# Patient Record
Sex: FEMALE | Race: WHITE | Employment: UNEMPLOYED | URBAN - NONMETROPOLITAN AREA
[De-identification: names, ages, dates, MRNs, and addresses within clinical notes are randomized per-mention and may not be internally consistent; named-entity substitution may affect disease eponyms.]

---

## 2018-06-21 ENCOUNTER — TELEPHONE (OUTPATIENT)
Dept: CARDIOLOGY CLINIC | Age: 63
End: 2018-06-21

## 2018-08-17 ENCOUNTER — OFFICE VISIT (OUTPATIENT)
Dept: SURGERY | Age: 63
End: 2018-08-17
Payer: COMMERCIAL

## 2018-08-17 VITALS — RESPIRATION RATE: 16 BRPM | WEIGHT: 187 LBS | HEIGHT: 62 IN | BODY MASS INDEX: 34.41 KG/M2

## 2018-08-17 DIAGNOSIS — Z90.13 S/P MASTECTOMY, BILATERAL: Primary | ICD-10-CM

## 2018-08-17 PROCEDURE — 1036F TOBACCO NON-USER: CPT | Performed by: PLASTIC SURGERY

## 2018-08-17 PROCEDURE — 99204 OFFICE O/P NEW MOD 45 MIN: CPT | Performed by: PLASTIC SURGERY

## 2018-08-17 PROCEDURE — G8419 CALC BMI OUT NRM PARAM NOF/U: HCPCS | Performed by: PLASTIC SURGERY

## 2018-08-17 PROCEDURE — 3017F COLORECTAL CA SCREEN DOC REV: CPT | Performed by: PLASTIC SURGERY

## 2018-08-17 PROCEDURE — G8427 DOCREV CUR MEDS BY ELIG CLIN: HCPCS | Performed by: PLASTIC SURGERY

## 2018-08-17 RX ORDER — HYDROCHLOROTHIAZIDE 25 MG/1
25 TABLET ORAL DAILY
COMMUNITY

## 2018-08-17 NOTE — PROGRESS NOTES
symmetry and similar shape between each breast will be the goal. However, a reconstructed breast will never appear the same as a native breast and to expect sone differences between each breasts. There is a likleyhood for needing more than one surgery for revisions. The patient was educated on the risks involving (Breast Implant Associated-Anaplastic Large Cell Lymphoma (BENITO-ALCL)). BENITO-ALCL is not a breast cancer, but a rare and treatable T-cell lymphoma that usually develops as a fluid swelling around breast implants. The lifetime risk for this disease appears to be about 1 case for every 30,000 textured implants. Thus far, there have been no confirmed cases of BENITO-ALCL in women who have had only smooth-surface breast implants. The FDA is not recommending removal of textured implants. Rather, the FDA recommends, as do I, that every woman conduct regular self-examination. The patient was educated that if she does develop BENITOALCL she may require additional treatment such as radiation or chemotherapy along with removal of the breast implant and surrounding scar tissue. The risks, benefits and options were discussed with the pt. The risks included but not limited to pain, bleeding, infection, heavy scarring, damage to surrounding structures,  and need for further procedures. Other risks including but not limited to asymmetry, loss of nipple or/and areola, loss of sensation to nipple and areola, inability to breast feed, seroma, hematoma, implant failure, capsular contracture, and fat necrosis were also discussed with the patient. All of her questions were answered. .    After long discussion with the patient and there is no guarantee that I can give her an optimal cosmetic result with her history of radiation therapy as well as the need for likely tissue expanders with subsequent implant placement.  Informed the patient this will require a minimum of 2 surgeries with no guarantee that she has an optimal

## 2018-08-21 ENCOUNTER — OFFICE VISIT (OUTPATIENT)
Dept: CARDIOLOGY CLINIC | Age: 63
End: 2018-08-21
Payer: COMMERCIAL

## 2018-08-21 VITALS
RESPIRATION RATE: 16 BRPM | HEIGHT: 62 IN | BODY MASS INDEX: 34.78 KG/M2 | DIASTOLIC BLOOD PRESSURE: 84 MMHG | SYSTOLIC BLOOD PRESSURE: 130 MMHG | WEIGHT: 189 LBS | HEART RATE: 58 BPM

## 2018-08-21 DIAGNOSIS — R06.02 SOB (SHORTNESS OF BREATH): Primary | ICD-10-CM

## 2018-08-21 DIAGNOSIS — R06.09 DOE (DYSPNEA ON EXERTION): ICD-10-CM

## 2018-08-21 PROCEDURE — 99204 OFFICE O/P NEW MOD 45 MIN: CPT | Performed by: INTERNAL MEDICINE

## 2018-08-21 PROCEDURE — G8427 DOCREV CUR MEDS BY ELIG CLIN: HCPCS | Performed by: INTERNAL MEDICINE

## 2018-08-21 PROCEDURE — 3017F COLORECTAL CA SCREEN DOC REV: CPT | Performed by: INTERNAL MEDICINE

## 2018-08-21 PROCEDURE — G8417 CALC BMI ABV UP PARAM F/U: HCPCS | Performed by: INTERNAL MEDICINE

## 2018-08-21 PROCEDURE — 1036F TOBACCO NON-USER: CPT | Performed by: INTERNAL MEDICINE

## 2018-08-21 PROCEDURE — 93000 ELECTROCARDIOGRAM COMPLETE: CPT | Performed by: INTERNAL MEDICINE

## 2018-08-21 RX ORDER — METOPROLOL SUCCINATE 50 MG/1
50 TABLET, EXTENDED RELEASE ORAL 2 TIMES DAILY
Qty: 60 TABLET | Refills: 5 | Status: SHIPPED | OUTPATIENT
Start: 2018-08-21

## 2018-08-21 NOTE — PROGRESS NOTES
[facility] Cardiology  MD Yfn Johnson DO Meredeth Server, MD Grafton Haff   1955  Thomas Abbott MD    Karin Saab was seen in our [facility] office today, 2018, for evaluation of dyspnea. She is a 21-year-old woman who was diagnosed with breast cancer in about the year . She has had bilateral mastectomies since then and has received chemotherapy as well as radiation to both sides of her chest.  In recent months, she has noticed increased dyspnea with exertion and chest heaviness with exertion. She did undergo a pharmacologic stress test in 2018 which showed an ejection fraction of 81% and breast attenuation artifact but no ischemia. Despite this, her symptoms have progressed. She specifically denies chest pain but just feels tight in her chest.  We were asked to assess her cardiac status. Past history includes the followin. Bilateral mastectomies for breast cancer, onset around the year . 2. Essential hypertension. 3. Mixed hyperlipidemia. 4. Persistent asthma. 5. GERD. 6. History of deep venous thrombosis of the left leg with bilateral pulmonary emboli while being treated for breast cancer. Patient is on chronic warfarin anticoagulation for this. 7. Pharmacologic stress test, 2018, ejection fraction 81%. Breast attenuation artifact. Otherwise normal.     Family history:  Negative for early coronary disease. Her biological father is estranged and she knows nothing of his history, but her mother is alive and well and does not have a history of heart disease. Her grandmother had heart disease at a middle age. Review of Systems:  HEENT: negative for acute visual and auditory problems  Constitutional: negative for fever and chills  Respiratory: Patient does have heaviness and tightness in her chest along with dyspnea on exertion.     Cardiovascular: negative for chest pain and dyspnea  Gastrointestinal: negative for abdominal pain, diarrhea, nausea and vomiting  Genitourinary: negative for dysuria and hematuria  Derm: negative for rash and skin lesion(s)  Neurological: negative for seizures and tremors  Endocrine: negative for diabetic symptoms including polydipsia and polyuria  Musculoskeletal: negative for CTD  Psychiatric: negative for anxiety and major depression. The remainder of the review of systems is negative except as noted above. Social history:  The patient has never smoked. On physical exam, the patient is a well-nourished white female who is awake, alert and oriented. P: 58 and regular. BP: 130/84. Wt. 189 lbs. BMI: 34.6. HEENT is normocephalic and atraumatic. Extraocular muscles are intact. Sclerae are clear. Pupils are equal, round and react to light. The oral mucosa is moist.  Tongue is midline. Her neck is supple. She has no jugular distention or hepatojugular reflux. Carotids are full. There are no bruits. She has no neck or supraclavicular masses. Respirations are unlabored. Her chest has normal breath sounds without wheezes or rales. She has no presacral edema or chest wall tenderness. Her heart has a regular rhythm. She has a 4th heart sound, but no 3rd heart sound. She has no significant murmurs. The PMI is not displaced. There is no precordial heave, lift or thrill. Her abdomen is soft and normally active without masses, organomegaly or bruits. Extremities showed no edema. She has a few petechiae on her right foot, which she states is from a brace that she wears. Pedal pulses are easily palpated in the feet bilaterally. I reviewed her electrocardiogram, which showed sinus bradycardia and was an otherwise normal tracing. Her heart rate was 58. As I reviewed her medications, I note that she is on both propranolol  mg daily and metoprolol succinate 25 mg twice a day.   She tells me that she does not have tremors and using 2 different beta blockers is probably not appropriate. Some of her dyspnea may be related to propranolol since this does cause bronchospasm and therefore, I did stop the propranolol today. I did increase her metoprolol succinate from 25 mg twice a day to 50 mg twice a day to prevent beta blocker withdrawal symptoms. At the end of the visit, her medications were:   metoprolol succinate (TOPROL XL) 50 MG extended release tablet Take 1 tablet by mouth 2 times daily   hydrochlorothiazide (HYDRODIURIL) 25 MG tablet Take 25 mg by mouth daily   ondansetron (ZOFRAN) 4 MG tablet Take 1 tablet by mouth every 8 hours as needed for Nausea or Vomiting   warfarin (COUMADIN) 5 MG tablet Take 5 mg by mouth daily LD 10/12/15   anastrozole (ARIMIDEX) 1 MG tablet Take 1 mg by mouth nightly   montelukast (SINGULAIR) 10 MG tablet Take 10 mg by mouth nightly   vitamin D (ERGOCALCIFEROL) 12124 UNITS CAPS capsule Take 50,000 Units by mouth once a week Takes on Sundays   omeprazole (PRILOSEC) 20 MG capsule Take 20 mg by mouth daily. Instructed to take morning of surgery with a sip of water   pravastatin (PRAVACHOL) 20 MG tablet Take 20 mg by mouth Daily with supper. metformin (GLUCOPHAGE) 500 MG tablet Take 500 mg by mouth Daily with supper.     gabapentin (NEURONTIN) 600 MG tablet Take 600 mg by mouth nightly For leg pain   diphenoxylate-atropine (LOMOTIL) 2.5-0.025 MG per tablet Take 1 tablet by mouth as needed.     zolpidem (AMBIEN) 10 MG tablet Take 10 mg by mouth nightly as needed.     albuterol (PROVENTIL;VENTOLIN) 90 MCG/ACT inhaler Inhale 2 puffs into the lungs every 6 hours as needed. Bring with pt    fluticasone (FLOVENT HFA) 110 MCG/ACT inhaler Inhale 1 puff into the lungs as needed. Bring with pt to or    albuterol (PROVENTIL) (2.5 MG/3ML) 0.083% nebulizer solution Take 2.5 mg by nebulization every 6 hours as needed.  Use in am of surgery if needed    sertraline (ZOLOFT) 50 MG tablet    ibuprofen (IBU) 800 MG tablet

## 2018-08-28 ENCOUNTER — TELEPHONE (OUTPATIENT)
Dept: CARDIOLOGY CLINIC | Age: 63
End: 2018-08-28

## 2018-08-28 NOTE — TELEPHONE ENCOUNTER
The shakes are most likely withdrawal from beta blockers in general. Propranolol (Inderal) does help prevent some migraines and the change may have predisposed her to a migraine headache that day. On the other hand it does cause a lot more fatigued than the metoprolol (Toprol) does. Toprol should not cause a migraine. I would like her to give the Toprol another chance and see if it works better for her in the long run then the Inderal. Hopefully the transition will not continue to be this rough. Thanks!   DAB

## 2022-04-08 ENCOUNTER — HOSPITAL ENCOUNTER (INPATIENT)
Age: 67
LOS: 19 days | Discharge: HOME | DRG: 343 | End: 2022-04-27
Attending: STUDENT IN AN ORGANIZED HEALTH CARE EDUCATION/TRAINING PROGRAM | Admitting: INTERNAL MEDICINE
Payer: COMMERCIAL

## 2022-04-08 DIAGNOSIS — G89.3 PAIN DUE TO NEOPLASM: Primary | ICD-10-CM

## 2022-04-08 PROBLEM — G95.20 CORD COMPRESSION (HCC): Status: ACTIVE | Noted: 2022-04-08

## 2022-04-08 PROBLEM — M54.9 INTRACTABLE BACK PAIN: Status: ACTIVE | Noted: 2022-04-08

## 2022-04-08 PROCEDURE — 1200000000 HC SEMI PRIVATE

## 2022-04-08 PROCEDURE — 2580000003 HC RX 258: Performed by: FAMILY MEDICINE

## 2022-04-08 PROCEDURE — 6360000002 HC RX W HCPCS: Performed by: FAMILY MEDICINE

## 2022-04-08 PROCEDURE — 6370000000 HC RX 637 (ALT 250 FOR IP): Performed by: FAMILY MEDICINE

## 2022-04-08 RX ORDER — SODIUM CHLORIDE 0.9 % (FLUSH) 0.9 %
10 SYRINGE (ML) INJECTION PRN
Status: DISCONTINUED | OUTPATIENT
Start: 2022-04-08 | End: 2022-04-27 | Stop reason: HOSPADM

## 2022-04-08 RX ORDER — PANTOPRAZOLE SODIUM 40 MG/1
40 TABLET, DELAYED RELEASE ORAL DAILY
Status: DISCONTINUED | OUTPATIENT
Start: 2022-04-08 | End: 2022-04-09

## 2022-04-08 RX ORDER — MONTELUKAST SODIUM 10 MG/1
10 TABLET ORAL NIGHTLY
Status: DISCONTINUED | OUTPATIENT
Start: 2022-04-08 | End: 2022-04-27 | Stop reason: HOSPADM

## 2022-04-08 RX ORDER — PROMETHAZINE HYDROCHLORIDE 25 MG/1
12.5 TABLET ORAL EVERY 6 HOURS PRN
Status: DISCONTINUED | OUTPATIENT
Start: 2022-04-08 | End: 2022-04-27 | Stop reason: HOSPADM

## 2022-04-08 RX ORDER — METOPROLOL SUCCINATE 50 MG/1
50 TABLET, EXTENDED RELEASE ORAL 2 TIMES DAILY
Status: DISCONTINUED | OUTPATIENT
Start: 2022-04-08 | End: 2022-04-27 | Stop reason: HOSPADM

## 2022-04-08 RX ORDER — POLYETHYLENE GLYCOL 3350 17 G/17G
17 POWDER, FOR SOLUTION ORAL DAILY PRN
Status: DISCONTINUED | OUTPATIENT
Start: 2022-04-08 | End: 2022-04-15

## 2022-04-08 RX ORDER — FENTANYL 12 UG/H
1 PATCH TRANSDERMAL
Status: DISCONTINUED | OUTPATIENT
Start: 2022-04-08 | End: 2022-04-20

## 2022-04-08 RX ORDER — FENTANYL 50 UG/H
1 PATCH TRANSDERMAL
Status: DISCONTINUED | OUTPATIENT
Start: 2022-04-08 | End: 2022-04-08 | Stop reason: DRUGHIGH

## 2022-04-08 RX ORDER — HYDROCHLOROTHIAZIDE 25 MG/1
25 TABLET ORAL DAILY
Status: DISCONTINUED | OUTPATIENT
Start: 2022-04-08 | End: 2022-04-13

## 2022-04-08 RX ORDER — ONDANSETRON 2 MG/ML
4 INJECTION INTRAMUSCULAR; INTRAVENOUS EVERY 6 HOURS PRN
Status: DISCONTINUED | OUTPATIENT
Start: 2022-04-08 | End: 2022-04-27 | Stop reason: HOSPADM

## 2022-04-08 RX ORDER — PRAVASTATIN SODIUM 20 MG
20 TABLET ORAL
Status: DISCONTINUED | OUTPATIENT
Start: 2022-04-08 | End: 2022-04-27 | Stop reason: HOSPADM

## 2022-04-08 RX ORDER — ALBUTEROL SULFATE 2.5 MG/3ML
2.5 SOLUTION RESPIRATORY (INHALATION) EVERY 6 HOURS PRN
Status: DISCONTINUED | OUTPATIENT
Start: 2022-04-08 | End: 2022-04-27 | Stop reason: HOSPADM

## 2022-04-08 RX ORDER — SODIUM CHLORIDE 0.9 % (FLUSH) 0.9 %
10 SYRINGE (ML) INJECTION EVERY 12 HOURS SCHEDULED
Status: DISCONTINUED | OUTPATIENT
Start: 2022-04-08 | End: 2022-04-27 | Stop reason: HOSPADM

## 2022-04-08 RX ORDER — SODIUM CHLORIDE 9 MG/ML
INJECTION, SOLUTION INTRAVENOUS PRN
Status: DISCONTINUED | OUTPATIENT
Start: 2022-04-08 | End: 2022-04-27 | Stop reason: HOSPADM

## 2022-04-08 RX ORDER — ZOLPIDEM TARTRATE 5 MG/1
10 TABLET ORAL NIGHTLY PRN
Status: DISCONTINUED | OUTPATIENT
Start: 2022-04-08 | End: 2022-04-27 | Stop reason: HOSPADM

## 2022-04-08 RX ORDER — PANTOPRAZOLE SODIUM 40 MG/1
40 TABLET, DELAYED RELEASE ORAL DAILY
COMMUNITY

## 2022-04-08 RX ADMIN — METOPROLOL SUCCINATE 50 MG: 50 TABLET, EXTENDED RELEASE ORAL at 21:52

## 2022-04-08 RX ADMIN — MONTELUKAST SODIUM 10 MG: 10 TABLET ORAL at 21:58

## 2022-04-08 RX ADMIN — ZOLPIDEM TARTRATE 10 MG: 5 TABLET ORAL at 21:52

## 2022-04-08 RX ADMIN — CEFTRIAXONE 1000 MG: 1 INJECTION, POWDER, FOR SOLUTION INTRAMUSCULAR; INTRAVENOUS at 21:51

## 2022-04-08 RX ADMIN — PRAVASTATIN SODIUM 20 MG: 20 TABLET ORAL at 21:58

## 2022-04-08 RX ADMIN — Medication 10 ML: at 21:57

## 2022-04-08 RX ADMIN — HYDROMORPHONE HYDROCHLORIDE 1 MG: 1 INJECTION, SOLUTION INTRAMUSCULAR; INTRAVENOUS; SUBCUTANEOUS at 21:52

## 2022-04-08 ASSESSMENT — PAIN DESCRIPTION - ORIENTATION: ORIENTATION: MID;LOWER

## 2022-04-08 ASSESSMENT — PAIN DESCRIPTION - DESCRIPTORS: DESCRIPTORS: ACHING;STABBING

## 2022-04-08 ASSESSMENT — PAIN SCALES - GENERAL
PAINLEVEL_OUTOF10: 10
PAINLEVEL_OUTOF10: 6

## 2022-04-08 ASSESSMENT — PAIN DESCRIPTION - PROGRESSION: CLINICAL_PROGRESSION: NOT CHANGED

## 2022-04-08 ASSESSMENT — PAIN - FUNCTIONAL ASSESSMENT: PAIN_FUNCTIONAL_ASSESSMENT: PREVENTS OR INTERFERES SOME ACTIVE ACTIVITIES AND ADLS

## 2022-04-08 ASSESSMENT — PAIN DESCRIPTION - PAIN TYPE: TYPE: CHRONIC PAIN

## 2022-04-08 ASSESSMENT — PAIN DESCRIPTION - LOCATION: LOCATION: BACK

## 2022-04-08 ASSESSMENT — PAIN DESCRIPTION - ONSET: ONSET: ON-GOING

## 2022-04-08 ASSESSMENT — PAIN DESCRIPTION - FREQUENCY: FREQUENCY: CONTINUOUS

## 2022-04-08 NOTE — LETTER
[place] Department Medicaid  CERTIFICATION OF NECESSITY  FOR NON-EMERGENCY TRANSPORTATION   BY GROUND AMBULANCE      Individual Information   1. Name: Unique Garcia 2. [place] Medicaid Billing Number:    3. Address: [address] [place] [address]      Transportation Provider Information   4. Provider Name:    5. [place] Medicaid Provider Number:  National Provider Identifier (NPI):      Certification  7. Criteria:  During transport, this individual requires:  [x] Medical treatment or continuous     supervision by an EMT. [] The administration or regulation of oxygen by another person. [] Supervised protective restraint. 8. Period Beginning Date: 4/26/2022   9. Length  [x] Not more than 7 day(s)  [] One Year     Additional Information Relevant to Certification   10. Comments or Explanations, If Necessary or Appropriate   CORD COMPRESSION, INTRACTABLE BACK PAIN, BREAST CA WITH METS, INCREASED PAIN WITH [place] Practitioner Information   11. Name of Practitioner: Justin HASTINGS MD   12. [place] Medicaid Provider Number, If Applicable:  [address] Provider Identifier (NPI):      Signature Information   14. Date of Signature: 4/26/2022 15. Name of Person Signing: Nadir Merino   16. Signature and Professional Designation: Electronically signed by REID Camp on 4/26/2022 at 10:59 AM       [facility] [id number]  Rev. 7/2015       [address] [place] [address] Encounter Date/Time: 4/8/2022 [address] Account: [de-identified]    MRN: [id number]    Patient: Unique Garcia    Contact Serial #: [id number]      ENCOUNTER          Patient Class: I Private Enc?   No Unit  BD: SEYZ [id number] 5423/5423-A   Hospital Service: MED   Encounter DX: Cord compression [facility]) [*   ADM Provider: Adryan , DO   Procedure:     ATT Provider: Nicolasa Sorto MD   REF Provider: Naif Person      Admission DX: Cord compression ([facility] [address] Intractable back pain and DX codes: G95.20, M54.9    PATIENT                 Name: Justina Gardner : 1955 (66 yrs)   Address: [address] [place] [address] Sex: Female   City: [place] [address]         Marital Status: Single   Employer: NONE         Rastafari: Uatsdin   Primary Care Provider: KATHIE Lopez CNP         Primary Phone: [phone]   EMERGENCY CONTACT   Contact Name Legal Guardian? Relationship to Patient Home Phone Work Phone   1. Teressa Burt  2. *No Contact Specified*      Child    [phone]                 GUARANTOR            Guarantor: Justina Gardner     : 1955   Address: [address] [place] [address] Sex: Female     Anjali Tran [address]     Relation to Patient: Self       Home Phone: [phone]   Guarantor ID: [id number]       Work Phone:     Guarantor Employer: NONE         Status: NOT EMPLO*      COVERAGE  PRIMARY INSURANCE   Payor: Kenneth Brush Plan: [facility] MEDICAID   Payor Address: [facility] DEPARTMENT;[address]*,  [address] [place] [address], [facility]       Group Number: [id number] Insurance Type: INDEMNITY   Subscriber Name: Birgit Meyers : 1955   Subscriber ID: [id number] Pat. Rel. to Sub: Self   SECONDARY INSURANCE   Payor:   Plan:     Payor Address:  ,           Group Number:   Insurance Type:     Subscriber Name:   Subscriber :     Subscriber ID:   Pat.  Rel. to Sub:             CSN: [id number]

## 2022-04-09 PROBLEM — R53.81 DEBILITY: Status: ACTIVE | Noted: 2022-04-09

## 2022-04-09 PROBLEM — N39.0 UTI (URINARY TRACT INFECTION): Status: ACTIVE | Noted: 2022-04-09

## 2022-04-09 LAB
ANION GAP SERPL CALCULATED.3IONS-SCNC: 14 MMOL/L (ref 7–16)
BASOPHILS ABSOLUTE: 0.01 E9/L (ref 0–0.2)
BASOPHILS RELATIVE PERCENT: 0.1 % (ref 0–2)
BUN BLDV-MCNC: 20 MG/DL (ref 6–23)
CALCIUM SERPL-MCNC: 9.7 MG/DL (ref 8.6–10.2)
CHLORIDE BLD-SCNC: 100 MMOL/L (ref 98–107)
CO2: 23 MMOL/L (ref 22–29)
CREAT SERPL-MCNC: 0.8 MG/DL (ref 0.5–1)
EOSINOPHILS ABSOLUTE: 0 E9/L (ref 0.05–0.5)
EOSINOPHILS RELATIVE PERCENT: 0 % (ref 0–6)
GFR AFRICAN AMERICAN: >60
GFR NON-AFRICAN AMERICAN: >60 ML/MIN/1.73
GLUCOSE BLD-MCNC: 141 MG/DL (ref 74–99)
HCT VFR BLD CALC: 32.4 % (ref 34–48)
HEMOGLOBIN: 10.4 G/DL (ref 11.5–15.5)
IMMATURE GRANULOCYTES #: 0.06 E9/L
IMMATURE GRANULOCYTES %: 0.5 % (ref 0–5)
LYMPHOCYTES ABSOLUTE: 1.38 E9/L (ref 1.5–4)
LYMPHOCYTES RELATIVE PERCENT: 11.4 % (ref 20–42)
MCH RBC QN AUTO: 28.6 PG (ref 26–35)
MCHC RBC AUTO-ENTMCNC: 32.1 % (ref 32–34.5)
MCV RBC AUTO: 89 FL (ref 80–99.9)
MONOCYTES ABSOLUTE: 0.6 E9/L (ref 0.1–0.95)
MONOCYTES RELATIVE PERCENT: 5 % (ref 2–12)
NEUTROPHILS ABSOLUTE: 10.06 E9/L (ref 1.8–7.3)
NEUTROPHILS RELATIVE PERCENT: 83 % (ref 43–80)
PDW BLD-RTO: 14.5 FL (ref 11.5–15)
PLATELET # BLD: 329 E9/L (ref 130–450)
PMV BLD AUTO: 9.7 FL (ref 7–12)
POTASSIUM REFLEX MAGNESIUM: 3.7 MMOL/L (ref 3.5–5)
RBC # BLD: 3.64 E12/L (ref 3.5–5.5)
SODIUM BLD-SCNC: 137 MMOL/L (ref 132–146)
WBC # BLD: 12.1 E9/L (ref 4.5–11.5)

## 2022-04-09 PROCEDURE — 85025 COMPLETE CBC W/AUTO DIFF WBC: CPT

## 2022-04-09 PROCEDURE — 2580000003 HC RX 258: Performed by: FAMILY MEDICINE

## 2022-04-09 PROCEDURE — 6370000000 HC RX 637 (ALT 250 FOR IP): Performed by: FAMILY MEDICINE

## 2022-04-09 PROCEDURE — 80048 BASIC METABOLIC PNL TOTAL CA: CPT

## 2022-04-09 PROCEDURE — 6360000002 HC RX W HCPCS: Performed by: FAMILY MEDICINE

## 2022-04-09 PROCEDURE — 1200000000 HC SEMI PRIVATE

## 2022-04-09 RX ORDER — PANTOPRAZOLE SODIUM 40 MG/1
40 TABLET, DELAYED RELEASE ORAL
Status: DISCONTINUED | OUTPATIENT
Start: 2022-04-09 | End: 2022-04-24

## 2022-04-09 RX ADMIN — Medication 10 ML: at 08:29

## 2022-04-09 RX ADMIN — METOPROLOL SUCCINATE 50 MG: 50 TABLET, EXTENDED RELEASE ORAL at 22:06

## 2022-04-09 RX ADMIN — HYDROCHLOROTHIAZIDE 25 MG: 25 TABLET ORAL at 08:28

## 2022-04-09 RX ADMIN — HYDROMORPHONE HYDROCHLORIDE 1 MG: 1 INJECTION, SOLUTION INTRAMUSCULAR; INTRAVENOUS; SUBCUTANEOUS at 18:54

## 2022-04-09 RX ADMIN — PANTOPRAZOLE SODIUM 40 MG: 40 TABLET, DELAYED RELEASE ORAL at 05:09

## 2022-04-09 RX ADMIN — METOPROLOL SUCCINATE 50 MG: 50 TABLET, EXTENDED RELEASE ORAL at 08:29

## 2022-04-09 RX ADMIN — PRAVASTATIN SODIUM 20 MG: 20 TABLET ORAL at 17:35

## 2022-04-09 RX ADMIN — HYDROMORPHONE HYDROCHLORIDE 1 MG: 1 INJECTION, SOLUTION INTRAMUSCULAR; INTRAVENOUS; SUBCUTANEOUS at 05:17

## 2022-04-09 RX ADMIN — HYDROMORPHONE HYDROCHLORIDE 1 MG: 1 INJECTION, SOLUTION INTRAMUSCULAR; INTRAVENOUS; SUBCUTANEOUS at 14:19

## 2022-04-09 RX ADMIN — Medication 10 ML: at 22:03

## 2022-04-09 RX ADMIN — ZOLPIDEM TARTRATE 10 MG: 5 TABLET ORAL at 22:06

## 2022-04-09 RX ADMIN — MONTELUKAST SODIUM 10 MG: 10 TABLET ORAL at 22:06

## 2022-04-09 RX ADMIN — CEFTRIAXONE 1000 MG: 1 INJECTION, POWDER, FOR SOLUTION INTRAMUSCULAR; INTRAVENOUS at 22:02

## 2022-04-09 ASSESSMENT — PAIN SCALES - GENERAL
PAINLEVEL_OUTOF10: 7
PAINLEVEL_OUTOF10: 6
PAINLEVEL_OUTOF10: 7
PAINLEVEL_OUTOF10: 4
PAINLEVEL_OUTOF10: 9
PAINLEVEL_OUTOF10: 8
PAINLEVEL_OUTOF10: 7

## 2022-04-09 ASSESSMENT — PAIN DESCRIPTION - FREQUENCY
FREQUENCY: CONTINUOUS

## 2022-04-09 ASSESSMENT — PAIN DESCRIPTION - LOCATION
LOCATION: BACK

## 2022-04-09 ASSESSMENT — PAIN DESCRIPTION - ORIENTATION
ORIENTATION: MID;LOWER
ORIENTATION: LOWER;MID
ORIENTATION: MID;LOWER

## 2022-04-09 ASSESSMENT — PAIN DESCRIPTION - PAIN TYPE
TYPE: CHRONIC PAIN

## 2022-04-09 ASSESSMENT — PAIN DESCRIPTION - DESCRIPTORS
DESCRIPTORS: ACHING;DISCOMFORT;CONSTANT
DESCRIPTORS: ACHING;STABBING
DESCRIPTORS: ACHING;BURNING;DISCOMFORT
DESCRIPTORS: ACHING;BURNING;CONSTANT;DISCOMFORT

## 2022-04-09 ASSESSMENT — PAIN DESCRIPTION - PROGRESSION
CLINICAL_PROGRESSION: NOT CHANGED

## 2022-04-09 ASSESSMENT — PAIN DESCRIPTION - ONSET
ONSET: ON-GOING

## 2022-04-09 ASSESSMENT — PAIN - FUNCTIONAL ASSESSMENT
PAIN_FUNCTIONAL_ASSESSMENT: PREVENTS OR INTERFERES SOME ACTIVE ACTIVITIES AND ADLS

## 2022-04-09 NOTE — PROGRESS NOTES
Spoke with Chauncey Adrian from [facility] regarding TLSO brace order. Information faxed over.

## 2022-04-09 NOTE — PROGRESS NOTES
Attempted to reach Dr. Alexis Lucio for neurosurgery consult x2. Voice message left regarding consult.

## 2022-04-09 NOTE — H&P
Hospitalist History & Physical      PCP: KATHIE Barnett - CNP    Date of Service: Pt seen/examined on 4/8/2022     Chief Complaint:  had no chief complaint listed for this encounter. History Of Present Illness:    Ms. Steph Ceja, a 61y.o. year old female  who  has a past medical history of Anxiety, Arthritis, Asthma, Cancer ([facility] [address] COPD (chronic obstructive pulmonary disease) ([facility] [address] Depression, Diabetes mellitus ([facility] [address] FHx: chemotherapy, Hx of blood clots, Hyperlipidemia, Hypertension, PONV (postoperative nausea and vomiting), Radiation, Reflux, and Restless leg syndrome. Patient transferred from [facility].  Has a history of metastatic breast cancer. Also history of hypertension, COPD and DVT, chronically anticoagulated on Eliquis. Patient has been having back pain since November with radiation down her legs. She reported some intermittent incontinence of urine over the past month. Diagnosed with urinary tract infection and started on Rocephin. CT revealed extensive metastatic disease to the right humerus, ribs, liver, lymph nodes and thoracic and lumbar spines causing cord compression at T9. Patient was transferred to [facility] for neurosurgery consultation. Vital signs within normal limits and stable.       Past Medical History:   Diagnosis Date    Anxiety     pt anxious over surgery takes xanax prn    Arthritis     osteo    Asthma     Cancer ([facility] [address] 1999    right breast 2012 left breast    COPD (chronic obstructive pulmonary disease) (HCC)     stable per pt no sob    Depression     stable for diagnosis    Diabetes mellitus ([facility] [address]     stable per pt    FHx: chemotherapy 2000    Hx of blood clots     left leg, bilat lung PE, 2014    Hyperlipidemia     Hypertension     stable per pt    PONV (postoperative nausea and vomiting)     Radiation 2000    hx of    Reflux     Restless leg syndrome        Past Surgical History:   Procedure Laterality Date    ABDOMEN SURGERY      colon resection    BREAST RECONSTRUCTION Bilateral 10/23/2015     stage I    BREAST SURGERY  1999    partial right breast mastectomy     BREAST SURGERY  2012    left breast mastectomy    CARPAL TUNNEL RELEASE      right    CHOLECYSTECTOMY      FOOT SURGERY      HYSTERECTOMY  2001    JOINT REPLACEMENT  1999    right total hip    MOHS SURGERY  2011    TOENAIL EXCISION      bilat    TUNNELED VENOUS PORT PLACEMENT      right chest, 2012    VASCULAR SURGERY      stent placed left leg d/t blood clots        Prior to Admission medications    Medication Sig Start Date End Date Taking? Authorizing Provider   pantoprazole (PROTONIX) 40 MG tablet Take 40 mg by mouth daily   Yes Historical Provider, MD   metoprolol succinate (TOPROL XL) 50 MG extended release tablet Take 1 tablet by mouth 2 times daily 8/21/18   Melinda Davidson MD   hydrochlorothiazide (HYDRODIURIL) 25 MG tablet Take 25 mg by mouth daily    Historical Provider, MD   ondansetron (ZOFRAN) 4 MG tablet Take 1 tablet by mouth every 8 hours as needed for Nausea or Vomiting 10/23/15   Levon Pink MD   montelukast (SINGULAIR) 10 MG tablet Take 10 mg by mouth nightly    Historical Provider, MD   vitamin D (ERGOCALCIFEROL) 85376 UNITS CAPS capsule Take 50,000 Units by mouth once a week Takes on Sundays    Historical Provider, MD   ibuprofen (IBU) 800 MG tablet Take 1 tablet by mouth every 8 hours for 14 days. Must take with food 12/12/12 12/26/12  Alayna Rocha III, MD   pravastatin (PRAVACHOL) 20 MG tablet Take 20 mg by mouth Daily with supper. Historical Provider, MD   diphenoxylate-atropine (LOMOTIL) 2.5-0.025 MG per tablet Take 1 tablet by mouth as needed. Historical Provider, MD   zolpidem (AMBIEN) 10 MG tablet Take 10 mg by mouth nightly as needed. Historical Provider, MD   albuterol (PROVENTIL;VENTOLIN) 90 MCG/ACT inhaler Inhale 2 puffs into the lungs every 6 hours as needed.  Bring with pt Historical Provider, MD   fluticasone (FLOVENT HFA) 110 MCG/ACT inhaler Inhale 1 puff into the lungs as needed. Bring with pt to or     Historical Provider, MD   albuterol (PROVENTIL) (2.5 MG/3ML) 0.083% nebulizer solution Take 2.5 mg by nebulization every 6 hours as needed. Use in am of surgery if needed     Historical Provider, MD         Allergies:  Codeine, Adhesive tape, and Darvocet a500 [propoxyphene n-acetaminophen]    Social History:    TOBACCO:   reports that she has quit smoking. She smoked 0.50 packs per day. She has never used smokeless tobacco.  ETOH:   reports no history of alcohol use. Family History:    Reviewed in detail and negative for DM, CAD, Cancer, CVA. Positive as follows\"  No family history on file. REVIEW OF SYSTEMS:   Pertinent positives as noted in the HPI. All other systems reviewed and negative. PHYSICAL EXAM:  /67   Pulse 89   Temp 98.1 °F (36.7 °C) (Oral)   Resp 16   SpO2 98%   General appearance: No apparent distress, appears stated age and cooperative. HEENT: Normal cephalic, atraumatic without obvious deformity. Pupils equal, round, and reactive to light. Extra ocular muscles intact. Conjunctivae/corneas clear. Neck: Supple, with full range of motion. No jugular venous distention. Trachea midline. Respiratory: Clear to auscultation bilaterally  Cardiovascular: Regular rate and rhythm  Abdomen: Soft, nontender, nondistended  Musculoskeletal: No clubbing, cyanosis, edema of bilateral lower extremities. Brisk capillary refill. Skin: Normal skin color. No rashes or lesions. Neurologic:  Neurovascularly intact without any focal sensory/motor deficits.  Cranial nerves: II-XII intact, grossly non-focal.      CBC:   Recent Labs     04/07/22  0533   WBC 6.4   RBC 3.73*   HGB 10.7*   HCT 32.3*   MCV 86.5   RDW 15.9*        BMP:   Recent Labs     04/07/22  0533      K 3.7      CO2 23   BUN 15   CREATININE 1.1*   MG 1.7     LFT:  Recent Labs 04/07/22  0533   PROT 7.4   ALKPHOS 182*   ALT 15   AST 32   BILITOT 0.9     CE:  No results for input(s): Av [address] in the last 72 hours. PT/INR:   Recent Labs     04/08/22  0608   APTT 93.1     BNP: No results for input(s): BNP in the last 72 hours. ESR: No results found for: SEDRATE  CRP: No results found for: CRP  D Dimer: No results found for: DDIMER   Folate and B12:   Lab Results   Component Value Date    GNMYLHFY13 487 05/30/2017   , No results found for: FOLATE  Lactic Acid: No results found for: LACTA  Thyroid Studies:   Lab Results   Component Value Date    TSH 0.361 05/30/2017       Oupatient labs:  Lab Results   Component Value Date    CHOL 193 05/30/2017    TRIG 70 05/30/2017    HDL 69 (H) 05/30/2017    TSH 0.361 05/30/2017    INR 0.9 (L) 06/05/2017    LABA1C 6.3 (H) 05/30/2017       Urinalysis:    Lab Results   Component Value Date    WBCUA LARGE 04/07/2022    BACTERIA 2+ 04/07/2022    RBCUA MODERATE 04/07/2022    GLUCOSEU NEGATIVE 04/07/2022       Imaging:  No results found. ASSESSMENT:  -Metastatic breast cancer  -Cord compression of the lumbar and thoracic spines secondary to metastatic disease  -Urinary tract infection  -Hypertension  -COPD  -History of DVT  -Chronically anticoagulated on Eliquis      PLAN:  -Admit to medicine  -Consult neurosurgery  -Pain management  -PT/OT  -Ceftriaxone 1 g daily  -Monitor urine cultures  -Continue home medications        Diet: ADULT DIET;  Regular  Code Status: Full Code  Surrogate decision maker confirmed with patient:   Extended Emergency Contact Information  Primary Emergency Contact: [address] Phone: [phone]  Relation: Child    DVT Prophylaxis: []Lovenox []Heparin []PCD [] [address] []Encouraged ambulation  Disposition: []Med/Surg [] Intermediate [] ICU/CCU  Admit status: [] Observation [] Inpatient     +++++++++++++++++++++++++++++++++++++++++++++++++  Fabian Garcia DO  +++++++++++++++++++++++++++++++++++++++++++++++++  NOTE: This report was transcribed using voice recognition software. Every effort was made to ensure accuracy; however, inadvertent computerized transcription errors may be present.

## 2022-04-09 NOTE — PROGRESS NOTES
Pt was transferred with a heparin drip, which was d/c'ed upon admission. im assuming this was d/t the possibly of having spinal surgery with the pt being on Eliquis. However according to Dr Jez Carrizales note no plan for spinal surgery at this time. Messaged Dr Merino Session regarding this. Stated this will be addressed in AM.     1813: Messaged Dr Merino Session to make him aware of the pt having an IVC filter in LLE.

## 2022-04-09 NOTE — PROGRESS NOTES
Occupational Therapy    OT consult received to eval/treat and chart review complete. Patient on hold, await NS POC. OT to re-attempt at a later time. Thank you.        Karon Conteh, OTR/L  [id number]

## 2022-04-09 NOTE — PLAN OF CARE
Problem: Skin Integrity:  Goal: Will show no infection signs and symptoms  Description: Will show no infection signs and symptoms  Outcome: Met This Shift  Goal: Absence of new skin breakdown  Description: Absence of new skin breakdown  Outcome: Met This Shift  Goal: Risk for impaired skin integrity will decrease  Description: Risk for impaired skin integrity will decrease  Outcome: Met This Shift  Goal: Complications related to intravenous access or infusion will be avoided or minimized  Description: Complications related to intravenous access or infusion will be avoided or minimized  Outcome: Met This Shift     Problem: Sensory:  Goal: Pain level will decrease  Description: Pain level will decrease  Outcome: Met This Shift  Goal: Satisfaction with pain management regimen will improve  Description: Satisfaction with pain management regimen will improve  Outcome: Met This Shift     Problem: Infection - Central Venous Catheter-Associated Bloodstream Infection:  Goal: Will show no infection signs and symptoms  Description: Will show no infection signs and symptoms  Outcome: Met This Shift

## 2022-04-09 NOTE — PROGRESS NOTES
Hospitalist Progress Note      SYNOPSIS: Patient admitted on 4/8/2022 for Cord compression [facility])    61 old female with a history of metastatic breast cancer, with metastasis to the right humerus, ribs, liver and spine. She has cord compression at T9. She was transferred to [facility] for neurosurgery consultation. He was noted with a urinary tract infection and initiated on Rocephin    SUBJECTIVE:    Patient seen and examined  Records reviewed. Patient resting comfortably in bed. Patient seen by neurosurgery. No neurosurgical intervention planned at this time. She will follow-up as an outpatient for bone biopsy. He denies chest pain, chest pressure, chest tightness. No worsening nausea edema. No increased orthopnea. Patient has not been able to work with physical therapy and Occupational Therapy at this time    DIET: ADULT DIET; Regular  CODE: Full Code    Intake/Output Summary (Last 24 hours) at 4/9/2022 1349  Last data filed at 4/9/2022 1011  Gross per 24 hour   Intake 120 ml   Output 450 ml   Net -330 ml       OBJECTIVE:    /76   Pulse 88   Temp 96.7 °F (35.9 °C) (Temporal)   Resp 16   SpO2 96%     General appearance: No apparent distress, appears stated age and cooperative. HEENT:  Conjunctivae/corneas clear. Normocephalic, atraumatic. Neck: Supple. No jugular venous distention. Respiratory: Clear to auscultation, bilaterally. Equal and symmetric chest excursion with inspiration. Cardiovascular: Regular rate and rhythm. No murmurs, rubs, gallops. Normal S1-S2. Abdomen: Soft, nontender, nondistended  Musculoskeletal: No clubbing, cyanosis, no lower extremity edema, bilaterally. Brisk capillary refill.    Skin:  No rashes  on visible skin  Neurologic: awake, alert and following commands     ASSESSMENT:    Principal Problem:    Cord compression [facility])  Active Problems:    Breast cancer metastasized to axillary lymph node (HCC)    Intractable back pain    UTI (urinary tract infection)    Debility  Resolved Problems:    * No resolved hospital problems. *        PLAN:    Continue IV Rocephin at this time; will be able to transition to oral medications prior to discharge (culture and sensitivity demonstrated sensitivities to third-generation cephalosporins; likely transition to ASIYA ROTHMAN prior to discharge)  PT/Occupational Therapy   Neurosurgery recommendationsback brace, outpatient follow-up for bone biopsy  Appropriate pain management    DISPOSITION: To be determined    Medications:  REVIEWED DAILY    Infusion Medications    sodium chloride       Scheduled Medications    pantoprazole  40 mg Oral QAM AC    hydroCHLOROthiazide  25 mg Oral Daily    metoprolol succinate  50 mg Oral BID    montelukast  10 mg Oral Nightly    pravastatin  20 mg Oral Dinner    sodium chloride flush  10 mL IntraVENous 2 times per day    cefTRIAXone (ROCEPHIN) IV  1,000 mg IntraVENous Q24H    fentaNYL  1 patch TransDERmal Q72H     PRN Meds: albuterol, zolpidem, sodium chloride flush, sodium chloride, promethazine **OR** ondansetron, polyethylene glycol, HYDROmorphone    Labs:     Recent Labs     04/07/22  0533 04/09/22  0500   WBC 6.4 12.1*   HGB 10.7* 10.4*   HCT 32.3* 32.4*    329       Recent Labs     04/07/22  0533 04/09/22  0500    137   K 3.7 3.7    100   CO2 23 23   BUN 15 20   CREATININE 1.1* 0.8   CALCIUM 9.3 9.7       Recent Labs     04/07/22  0533   PROT 7.4   ALKPHOS 182*   ALT 15   AST 32   BILITOT 0.9       No results for input(s): INR in the last 72 hours. No results for input(s): Danii Dec in the last 72 hours.     Chronic labs:    Lab Results   Component Value Date    CHOL 193 05/30/2017    TRIG 70 05/30/2017    HDL 69 (H) 05/30/2017    TSH 0.361 05/30/2017    INR 0.9 (L) 06/05/2017    LABA1C 6.3 (H) 05/30/2017       Radiology: REVIEWED DAILY    +++++++++++++++++++++++++++++++++++++++++++++++++  Deniece Merlin, DO [employer] - [address], [place]  +++++++++++++++++++++++++++++++++++++++++++++++++  NOTE: This report was transcribed using voice recognition software. Every effort was made to ensure accuracy; however, inadvertent computerized transcription errors may be present.

## 2022-04-09 NOTE — CONSULTS
Neurosurgery Consult Note      CHIEF COMPLAINT: back pain    HPI:Ms. Andrea Suero, a 61y.o. year old female  who  has a past medical history of Anxiety, Arthritis, Asthma, Cancer ([address] COPD (chronic obstructive pulmonary disease) ([address] Depression, Diabetes mellitus ([address] FHx: chemotherapy, Hx of blood clots, Hyperlipidemia, Hypertension, PONV (postoperative nausea and vomiting), Radiation, Reflux, and Restless leg syndrome.      Patient transferred from [facility].  Has a history of metastatic breast cancer. Also history of hypertension, COPD and DVT, chronically anticoagulated on Eliquis. Patient has been having back pain since November with radiation down her legs. She reported some intermittent incontinence of urine over the past month. Diagnosed with urinary tract infection and started on Rocephin. CT revealed extensive metastatic disease to the right humerus, ribs, liver, lymph nodes and thoracic and lumbar spines causing cord compression at T9. Patient was transferred to [facility] for neurosurgery consultation.   Vital signs within normal limits and sta      Past Medical History:   Diagnosis Date    Anxiety     pt anxious over surgery takes xanax prn    Arthritis     osteo    Asthma     Cancer ([address] 1999    right breast 2012 left breast    COPD (chronic obstructive pulmonary disease) (HCC)     stable per pt no sob    Depression     stable for diagnosis    Diabetes mellitus ([address]     stable per pt    FHx: chemotherapy 2000    Hx of blood clots     left leg, bilat lung PE, 2014    Hyperlipidemia     Hypertension     stable per pt    PONV (postoperative nausea and vomiting)     Radiation 2000    hx of    Reflux     Restless leg syndrome      Past Surgical History:   Procedure Laterality Date    ABDOMEN SURGERY      colon resection    BREAST RECONSTRUCTION Bilateral 10/23/2015     stage I    BREAST SURGERY  1999    partial right breast mastectomy     BREAST SURGERY 2012    left breast mastectomy    CARPAL TUNNEL RELEASE      right    CHOLECYSTECTOMY      FOOT SURGERY      HYSTERECTOMY  2001    JOINT REPLACEMENT  1999    right total hip    MOHS SURGERY  2011    TOENAIL EXCISION      bilat    TUNNELED VENOUS PORT PLACEMENT      right chest, 2012    VASCULAR SURGERY      stent placed left leg d/t blood clots      Codeine, Adhesive tape, and Darvocet a500 [propoxyphene n-acetaminophen]  Prior to Admission medications    Medication Sig Start Date End Date Taking? Authorizing Provider   pantoprazole (PROTONIX) 40 MG tablet Take 40 mg by mouth daily   Yes Historical Provider, MD   metoprolol succinate (TOPROL XL) 50 MG extended release tablet Take 1 tablet by mouth 2 times daily 8/21/18   Himanshu Perez MD   hydrochlorothiazide (HYDRODIURIL) 25 MG tablet Take 25 mg by mouth daily    Historical Provider, MD   ondansetron (ZOFRAN) 4 MG tablet Take 1 tablet by mouth every 8 hours as needed for Nausea or Vomiting 10/23/15   Arnoldo Zheng MD   montelukast (SINGULAIR) 10 MG tablet Take 10 mg by mouth nightly    Historical Provider, MD   vitamin D (ERGOCALCIFEROL) 56316 UNITS CAPS capsule Take 50,000 Units by mouth once a week Takes on Sundays    Historical Provider, MD   ibuprofen (IBU) 800 MG tablet Take 1 tablet by mouth every 8 hours for 14 days. Must take with food 12/12/12 12/26/12  Suhas Sensor III, MD   pravastatin (PRAVACHOL) 20 MG tablet Take 20 mg by mouth Daily with supper. Historical Provider, MD   diphenoxylate-atropine (LOMOTIL) 2.5-0.025 MG per tablet Take 1 tablet by mouth as needed. Historical Provider, MD   zolpidem (AMBIEN) 10 MG tablet Take 10 mg by mouth nightly as needed. Historical Provider, MD   albuterol (PROVENTIL;VENTOLIN) 90 MCG/ACT inhaler Inhale 2 puffs into the lungs every 6 hours as needed.  Bring with pt     Historical Provider, MD   fluticasone (FLOVENT HFA) 110 MCG/ACT inhaler Inhale 1 puff into the lungs as needed. Bring with pt to or     Historical Provider, MD   albuterol (PROVENTIL) (2.5 MG/3ML) 0.083% nebulizer solution Take 2.5 mg by nebulization every 6 hours as needed. Use in am of surgery if needed     Historical Provider, MD     Outpatient Medications Marked as Taking for the 4/8/22 encounter [facility] Encounter)   Medication Sig Dispense Refill    pantoprazole (PROTONIX) 40 MG tablet Take 40 mg by mouth daily       Social History     Socioeconomic History    Marital status: Single     Spouse name: Not on file    Number of children: Not on file    Years of education: Not on file    Highest education level: Not on file   Occupational History    Not on file   Tobacco Use    Smoking status: Former Smoker     Packs/day: 0.50    Smokeless tobacco: Never Used   Substance and Sexual Activity    Alcohol use: No    Drug use: No    Sexual activity: Not on file   Other Topics Concern    Not on file   Social History Narrative    Not on file     Social Determinants of Health     Financial Resource Strain:     Difficulty of Paying Living Expenses: Not on file   Food Insecurity:     Worried About Running Out of Food in the Last Year: Not on file    Amol of Food in the Last Year: Not on file   Transportation Needs:     Lack of Transportation (Medical): Not on file    Lack of Transportation (Non-Medical):  Not on file   Physical Activity:     Days of Exercise per Week: Not on file    Minutes of Exercise per Session: Not on file   Stress:     Feeling of Stress : Not on file   Social Connections:     Frequency of Communication with Friends and Family: Not on file    Frequency of Social Gatherings with Friends and Family: Not on file    Attends Holiness Services: Not on file    Active Member of Clubs or Organizations: Not on file    Attends Club or Organization Meetings: Not on file    Marital Status: Not on file   Intimate Partner Violence:     Fear of Current or Ex-Partner: Not on file   [employer] Emotionally Abused: Not on file    Physically Abused: Not on file    Sexually Abused: Not on file   Housing Stability:     Unable to Pay for Housing in the Last Year: Not on file    Number of Places Lived in the Last Year: Not on file    Unstable Housing in the Last Year: Not on file     No family history on file. ROS: Complete 10 point ROS was obtained with positives in HPI, otherwise:  Pt denies fevers, denies chills. Pt denies chest pain, denies SOB, denies nausea, denies vomiting, denies headache. VITALS/DRAINS:   VITALS:  /76   Pulse 88   Temp 96.7 °F (35.9 °C) (Temporal)   Resp 16   SpO2 96%   24HR INTAKE/OUTPUT:    Intake/Output Summary (Last 24 hours) at 4/9/2022 1311  Last data filed at 4/9/2022 1011  Gross per 24 hour   Intake 120 ml   Output 450 ml   Net -330 ml       EXAMINATION: Laying supine in the hospital bed no acute distress excellent historian cranial Nerves: Motor: 5 out of 5 throughout  Sensory: Grossly intact to light touch  Cerebellar: Finger-nose testing normal  Gait: Not checked  DTRs: +1 equal bilaterally    IMAGING STUDIES: Multiple spinal metastasis consistent with a history of breast cancer  No results found.     LABS:  CBC:   Lab Results   Component Value Date    WBC 12.1 04/09/2022    RBC 3.64 04/09/2022    HGB 10.4 04/09/2022    HCT 32.4 04/09/2022    MCV 89.0 04/09/2022    MCH 28.6 04/09/2022    MCHC 32.1 04/09/2022    RDW 14.5 04/09/2022     04/09/2022    MPV 9.7 04/09/2022     BMP:    Lab Results   Component Value Date     04/09/2022    K 3.7 04/09/2022     04/09/2022    CO2 23 04/09/2022    BUN 20 04/09/2022    LABALBU 3.3 04/07/2022    CREATININE 0.8 04/09/2022    CALCIUM 9.7 04/09/2022    GFRAA >60 04/09/2022    LABGLOM >60 04/09/2022    LABGLOM >60 05/30/2017    GLUCOSE 141 04/09/2022    GLUCOSE 178 05/30/2017       IMPRESSION: Multiple spinal metastasis likely breast CA but will need biopsy    RECOMMENDATIONS: For now the plan is to put her in an off-the-shelf TLSO brace, risks and benefits of spinal surgery for decompression and stabilization were carefully discussed with her ,she is not interested in spinal surgery. she has metastases throughout multiple regions of her spine and apparently her oncologist would like a biopsy to be certain this is breast cancer. I will have her bring back the imaging of her chest abdomen and pelvis documenting the extent of her metastasis (she had the images on a disc but gave it to her family members to take home) we will make a decision on the safest spot to perform a biopsy, for now no neurosurgical intervention planned so her diet may be advanced. she may need a CT-guided biopsy of one of the metastatic lesions but need to review imaging done in [facility] to make that determination, she states she has metastasis to her right shoulder and there were some positive lymph nodes in the region    Thank you again for this consultation.       Kathi Mendez MD  4/9/2022

## 2022-04-09 NOTE — PROGRESS NOTES
Physical Therapy    Date: 2022       Patient Name: Kiera Ulloa  : 1955      MRN: [id number]    PT order received. Chart has been reviewed. PT evaluation will be on hold due to awaiting NS POC. Will continue to follow and complete evaluation at later time.      Ro Nieves, PT

## 2022-04-10 ENCOUNTER — APPOINTMENT (OUTPATIENT)
Dept: CT IMAGING | Age: 67
DRG: 343 | End: 2022-04-10
Attending: STUDENT IN AN ORGANIZED HEALTH CARE EDUCATION/TRAINING PROGRAM
Payer: COMMERCIAL

## 2022-04-10 LAB
ALBUMIN SERPL-MCNC: 4 G/DL (ref 3.5–5.2)
ALP BLD-CCNC: 230 U/L (ref 35–104)
ALT SERPL-CCNC: 42 U/L (ref 0–32)
AST SERPL-CCNC: 58 U/L (ref 0–31)
BILIRUB SERPL-MCNC: 0.3 MG/DL (ref 0–1.2)
BILIRUBIN DIRECT: <0.2 MG/DL (ref 0–0.3)
BILIRUBIN, INDIRECT: ABNORMAL MG/DL (ref 0–1)
TOTAL PROTEIN: 8 G/DL (ref 6.4–8.3)

## 2022-04-10 PROCEDURE — 97535 SELF CARE MNGMENT TRAINING: CPT

## 2022-04-10 PROCEDURE — 97530 THERAPEUTIC ACTIVITIES: CPT

## 2022-04-10 PROCEDURE — 6370000000 HC RX 637 (ALT 250 FOR IP): Performed by: INTERNAL MEDICINE

## 2022-04-10 PROCEDURE — 97165 OT EVAL LOW COMPLEX 30 MIN: CPT

## 2022-04-10 PROCEDURE — 6370000000 HC RX 637 (ALT 250 FOR IP): Performed by: FAMILY MEDICINE

## 2022-04-10 PROCEDURE — 1200000000 HC SEMI PRIVATE

## 2022-04-10 PROCEDURE — 6360000002 HC RX W HCPCS: Performed by: FAMILY MEDICINE

## 2022-04-10 PROCEDURE — 71270 CT THORAX DX C-/C+: CPT

## 2022-04-10 PROCEDURE — 6360000004 HC RX CONTRAST MEDICATION: Performed by: RADIOLOGY

## 2022-04-10 PROCEDURE — 80076 HEPATIC FUNCTION PANEL: CPT

## 2022-04-10 PROCEDURE — 97161 PT EVAL LOW COMPLEX 20 MIN: CPT

## 2022-04-10 PROCEDURE — 2580000003 HC RX 258: Performed by: FAMILY MEDICINE

## 2022-04-10 PROCEDURE — 36415 COLL VENOUS BLD VENIPUNCTURE: CPT

## 2022-04-10 RX ORDER — SODIUM CHLORIDE 0.9 % (FLUSH) 0.9 %
10 SYRINGE (ML) INJECTION
Status: ACTIVE | OUTPATIENT
Start: 2022-04-10 | End: 2022-04-10

## 2022-04-10 RX ADMIN — METOPROLOL SUCCINATE 50 MG: 50 TABLET, EXTENDED RELEASE ORAL at 20:34

## 2022-04-10 RX ADMIN — IOPAMIDOL 90 ML: 755 INJECTION, SOLUTION INTRAVENOUS at 17:14

## 2022-04-10 RX ADMIN — Medication 10 ML: at 08:35

## 2022-04-10 RX ADMIN — HYDROMORPHONE HYDROCHLORIDE 1 MG: 1 INJECTION, SOLUTION INTRAMUSCULAR; INTRAVENOUS; SUBCUTANEOUS at 14:01

## 2022-04-10 RX ADMIN — APIXABAN 5 MG: 5 TABLET, FILM COATED ORAL at 20:34

## 2022-04-10 RX ADMIN — CEFTRIAXONE 1000 MG: 1 INJECTION, POWDER, FOR SOLUTION INTRAMUSCULAR; INTRAVENOUS at 20:34

## 2022-04-10 RX ADMIN — Medication 10 ML: at 19:33

## 2022-04-10 RX ADMIN — MONTELUKAST SODIUM 10 MG: 10 TABLET ORAL at 20:34

## 2022-04-10 RX ADMIN — HYDROMORPHONE HYDROCHLORIDE 1 MG: 1 INJECTION, SOLUTION INTRAMUSCULAR; INTRAVENOUS; SUBCUTANEOUS at 19:33

## 2022-04-10 RX ADMIN — APIXABAN 5 MG: 5 TABLET, FILM COATED ORAL at 10:28

## 2022-04-10 RX ADMIN — HYDROCHLOROTHIAZIDE 25 MG: 25 TABLET ORAL at 08:36

## 2022-04-10 RX ADMIN — Medication 10 ML: at 17:14

## 2022-04-10 RX ADMIN — PANTOPRAZOLE SODIUM 40 MG: 40 TABLET, DELAYED RELEASE ORAL at 05:38

## 2022-04-10 RX ADMIN — HYDROMORPHONE HYDROCHLORIDE 1 MG: 1 INJECTION, SOLUTION INTRAMUSCULAR; INTRAVENOUS; SUBCUTANEOUS at 00:44

## 2022-04-10 RX ADMIN — PRAVASTATIN SODIUM 20 MG: 20 TABLET ORAL at 18:01

## 2022-04-10 ASSESSMENT — PAIN DESCRIPTION - ONSET: ONSET: ON-GOING

## 2022-04-10 ASSESSMENT — PAIN SCALES - GENERAL
PAINLEVEL_OUTOF10: 7
PAINLEVEL_OUTOF10: 10
PAINLEVEL_OUTOF10: 10

## 2022-04-10 ASSESSMENT — PAIN DESCRIPTION - DESCRIPTORS
DESCRIPTORS: ACHING;DISCOMFORT
DESCRIPTORS: ACHING;DISCOMFORT;CONSTANT

## 2022-04-10 ASSESSMENT — PAIN DESCRIPTION - PAIN TYPE
TYPE: CHRONIC PAIN
TYPE: ACUTE PAIN

## 2022-04-10 ASSESSMENT — PAIN DESCRIPTION - LOCATION: LOCATION: BACK

## 2022-04-10 ASSESSMENT — PAIN DESCRIPTION - FREQUENCY: FREQUENCY: CONTINUOUS

## 2022-04-10 NOTE — PROGRESS NOTES
Physical Therapy  Physical Therapy Initial Evaluation    Name: Sarai Marion  : 1955  MRN: [id number]      Date of Service: 4/10/2022    Evaluating PT:  Loco Bowser, PT [id number]      Room #:  9809/3960-M  Diagnosis:  Cord compression (HCC) [G95.20]  Intractable back pain [M54.9]  PMHx/PSHx:     has a past medical history of Anxiety, Arthritis, Asthma, Cancer ([facility] COPD (chronic obstructive pulmonary disease) ([facility] Depression, Diabetes mellitus ([facility] FHx: chemotherapy, Hx of blood clots, Hyperlipidemia, Hypertension, PONV (postoperative nausea and vomiting), Radiation, Reflux, and Restless leg syndrome. has a past surgical history that includes Hysterectomy (); Abdomen surgery; Carpal tunnel release; Cholecystectomy; toenail excision; Foot surgery; Mohs surgery (); joint replacement (); Breast surgery (); Breast surgery (); vascular surgery; Tunneled venous port placement; and Breast reconstruction (Bilateral, 10/23/2015 ). Procedure/Surgery:  noen  Precautions:  Falls,  FWB (full weight bearing) , Soft TLSO  Equipment Needs: Wheeled Walker,    SUBJECTIVE:    Patient lives with family with children who can assist  in a ranch home  with 3 + 3 steps to enter with Rail  Bed is on 1 floor and bath is on 1 floor. Patient ambulated independently  PTA. Equipment owned: [place],      OBJECTIVE:   Initial Evaluation  Date: 4/10/22 Treatment Short Term/ Long Term   Goals   AM-PAC 6 Clicks 71/78     Was pt agreeable to Eval/treatment? yes     Does pt have pain? 8/10     Bed Mobility  Rolling: Min   Supine to sit:   Min   Sit to supine: Min   Scooting: Min fww  Rolling: Ind  Supine to sit: Ind  Sit to supine: Ind  Scooting: Ind   Transfers Sit to stand: Min to fww  Stand to sit: Min  Stand pivot: Min with fww  Sit to stand: Mod Ind  to fww  Stand to sit: Mod Ind    Stand pivot:  Mod Ind  with fww   Ambulation    30 feet with fww CGA  100 feet with Mod Ind  fww   Stair negotiation: ascended and descended  NT  3+3 steps with Min    ROM BUE:  wfl   BLE:  wfl     Strength BUE: wfl   RLE:  Grossly 4+/5  LLE:  Grossly  4+/5  5/5   Balance Sitting EOB:  Ind  Dynamic Standing:  Min  Sitting EOB:  Ind  Dynamic Standing: Mod Ind     Patient is Alert & Oriented x person, place, time and situation and follows directions   Sensation:  Pt denies numbness and tingling to extremities  Edema:  none  Therapeutic Exercises:  Functional activity as stated above. Patient education  Pt educated regarding role of PT evaluation, need for OOB activity, spinal precautions, application of TLSO and use of call light for safety    Patient response to education:   Pt verbalized understanding Pt demonstrated skill Pt requires further education in this area   yes yes Reminders     ASSESSMENT:    Conditions Requiring Skilled Therapeutic Intervention:    [x]Decreased strength     [x]Decreased ROM  [x]Decreased functional mobility  [x]Decreased balance   [x]Decreased endurance   [x]Decreased posture  []Decreased sensation  []Decreased coordination   []Decreased vision  [x]Decreased safety awareness   []Increased pain       Comments:    RN cleared patient for participation in therapy session. Patient was seen this date for PT evaluation. Patient was agreeable to intervention. Results of the functional assessment are noted above. Upon entering the room patient was found supine in bed. TLSO applied adhering to precautions. . Sat EOB x 10 minutes to increase dynamic sitting balance and activity tolerance. Transfers and gait completed with fww for support. Cues required for sequencing. At end of session, patient sitting edge of bed with call light and phone within reach,  all lines and tubes intact, nursing notified. This patient can benefit from the continuation of skilled PT  to maximize functional level and return to PLOF.      Treatment:  Patient practiced and was instructed in the following treatment:    · Bed mobility training - pt given verbal and tactile cues to facilitate proper sequencing and safety during rolling and supine>sit as well as provided with physical assistance to complete task    · Assistive device training - pt educated on using [address] during gait, [address] approximation/negotiation, and hand placement during sit<>stand to [address]  · STS and transfer training - educated on hand/foot placement, safety, and sequencing during STS and pivot transfers using assistive device  · Gait training - verbal cues for [address] approximation/negotiation, upright posture, and safety during 90 and 180 degree turns during gait   · Education in spinal precautions  and application of TLSO adhering to those precautions. Pt's/ family goals      1. Home    Prognosis is good  for reaching above PT goals.     Patient and or family understand(s) diagnosis, prognosis, and plan of care.  yes,     PHYSICAL THERAPY PLAN OF CARE:    PT POC is established based on physician order and patient diagnosis     Referring provider/PT Order:  PT Eval and Treat   04/08/22 2030  PT eval and treat          Gerardo Beltre, DO       Diagnosis:  Cord compression ([facility] [G95.20]  Intractable back pain [M54.9]  Specific instructions for next treatment:  Transfer to bedside chair, Increase ambulation distance, BLE therapeutic exercise and Review spinal precautions  Current Treatment Recommendations:     [x] Strengthening to improve independence with functional mobility   [x] ROM to improve independence with functional mobility   [x] Balance Training to improve static/dynamic balance and to reduce fall risk  [x] Endurance Training to improve activity tolerance during functional mobility   [x] Transfer Training to improve safety and independence with all functional transfers   [x] Gait Training to improve gait mechanics, endurance and asses need for appropriate assistive device  [x] Stair Training in preparation for safe discharge home and/or into the community   [] Positioning to prevent skin breakdown and contractures  [x] Safety and Education Training   [] Patient/Caregiver Education   [] HEP  [] Other     PT long term treatment goals are located in above grid    Frequency of treatments: 2-5x/week x 1-2 weeks. Time in  0735  Time out  0820    Total Treatment Time  45 minutes     Evaluation Time includes thorough review of current medical information, gathering information on past medical history/social history and prior level of function, completion of standardized testing/informal observation of tasks, assessment of data and education on plan of care and goals.     CPT codes:  [x] Low Complexity PT evaluation 26229  [] Moderate Complexity PT evaluation 66307  [] High Complexity PT evaluation 67789  [] PT Re-evaluation 60634  [] Gait training 94624 - minutes  [] Manual therapy 69971 - minutes  [x] Therapeutic activities 55266 25 minutes  [] Therapeutic exercises 46600 - minutes  [] Neuromuscular reeducation 62627 - minutes     Abi Garg, [address] [facility]

## 2022-04-10 NOTE — PROGRESS NOTES
Neurosurg progress note  VITALS:  /72   Pulse 74   Temp 97.4 °F (36.3 °C) (Temporal)   Resp 18   SpO2 95%   24HR INTAKE/OUTPUT:    Intake/Output Summary (Last 24 hours) at 4/10/2022 1423  Last data filed at 4/10/2022 1347  Gross per 24 hour   Intake 720 ml   Output 1400 ml   Net -680 ml     No results found.   CBC:   Lab Results   Component Value Date    WBC 12.1 04/09/2022    RBC 3.64 04/09/2022    HGB 10.4 04/09/2022    HCT 32.4 04/09/2022    MCV 89.0 04/09/2022    MCH 28.6 04/09/2022    MCHC 32.1 04/09/2022    RDW 14.5 04/09/2022     04/09/2022    MPV 9.7 04/09/2022     BMP:    Lab Results   Component Value Date     04/09/2022    K 3.7 04/09/2022     04/09/2022    CO2 23 04/09/2022    BUN 20 04/09/2022    LABALBU 3.3 04/07/2022    CREATININE 0.8 04/09/2022    CALCIUM 9.7 04/09/2022    GFRAA >60 04/09/2022    LABGLOM >60 04/09/2022    LABGLOM >60 05/30/2017    GLUCOSE 141 04/09/2022    GLUCOSE 178 05/30/2017      apixaban  5 mg Oral BID    pantoprazole  40 mg Oral QAM AC    hydroCHLOROthiazide  25 mg Oral Daily    metoprolol succinate  50 mg Oral BID    montelukast  10 mg Oral Nightly    pravastatin  20 mg Oral Dinner    sodium chloride flush  10 mL IntraVENous 2 times per day    cefTRIAXone (ROCEPHIN) IV  1,000 mg IntraVENous Q24H    fentaNYL  1 patch TransDERmal Q72H     Remains awake and alert, oriented x3 motor full throughout  Assessment:  Patient Active Problem List   Diagnosis    S/P mastectomy, bilateral    Breast cancer metastasized to axillary lymph node (HCC)    Cord compression (HCC)    Intractable back pain    UTI (urinary tract infection)    Debility     Plan: If oncology consult recommends biopsy she will need metastatic work-up to determine where biopsy would be the safest for her  Racheal Muller MD M.D.

## 2022-04-10 NOTE — PROGRESS NOTES
OCCUPATIONAL THERAPY INITIAL EVALUATION    SHANEL [facility] [employer] [id number] [facility]  [address] [place] [address]    Date:4/10/2022                                                  Patient Name: Luisa Bay    MRN: [id number]    : 1955    Room: [address] [place] [address]      Evaluating OT: CHARLES Castillo/DELANO, [id number]    Referring Joya Quigley DO  Specific Provider Orders/Date: OT eval and treat 22    Diagnosis: Cord compression ([facility] [G95.20]  Intractable back pain [M54.9]   Surgery: NA     Pertinent Medical History:      Past Medical History:   Diagnosis Date    Anxiety     pt anxious over surgery takes xanax prn    Arthritis     osteo    Asthma     Cancer ([facility]     right breast 2012 left breast    COPD (chronic obstructive pulmonary disease) ([facility]     stable per pt no sob    Depression     stable for diagnosis    Diabetes mellitus ([facility]     stable per pt    FHx: chemotherapy     Hx of blood clots     left leg, bilat lung PE, 2014    Hyperlipidemia     Hypertension     stable per pt    PONV (postoperative nausea and vomiting)     Radiation     hx of    Reflux     Restless leg syndrome          Past Surgical History:   Procedure Laterality Date    ABDOMEN SURGERY      colon resection    BREAST RECONSTRUCTION Bilateral 10/23/2015     stage I    BREAST SURGERY      partial right breast mastectomy     BREAST SURGERY      left breast mastectomy    CARPAL TUNNEL RELEASE      right    CHOLECYSTECTOMY      FOOT SURGERY      HYSTERECTOMY  2001    JOINT REPLACEMENT      right total hip    MOHS SURGERY  2011    TOENAIL EXCISION      bilat    TUNNELED VENOUS PORT PLACEMENT      right chest, 2012    VASCULAR SURGERY      stent placed left leg d/t blood clots      Precautions:  Fall Risk, soft TLSO, full weight bearing    Assessment of current deficits    [x] Functional mobility  [x]ADLs  [x] Strength               []Cognition    [x] Functional transfers   [x] IADLs         [] Safety Awareness   [x]Endurance    [] Fine Coordination              [] Balance      [] Vision/perception   []Sensation     []Gross Motor Coordination  [] ROM  [] Delirium                   [] Motor Control     OT PLAN OF CARE   OT POC based on physician orders, patient diagnosis and results of clinical assessment    Frequency/Duration 1-3 days/wk for 2 weeks PRN   Specific OT Treatment Interventions to include:   * Instruction/training on adapted ADL techniques and AE recommendations to increase functional independence within precautions       * Training on energy conservation strategies, correct breathing pattern and techniques to improve independence/tolerance for self-care routine  * Functional transfer/mobility training/DME recommendations for increased independence, safety, and fall prevention  * Patient/Family education to increase follow through with safety techniques and functional independence  * Recommendation of environmental modifications for increased safety with functional transfers/mobility and ADLs  * Splinting/positioning for increased function, prevention of contractures, and improve skin integrity  * Therapeutic exercise to improve motor endurance, ROM, and functional strength for ADLs/functional transfers  * Therapeutic activities to facilitate/challenge dynamic balance, stand tolerance for increased safety and independence with ADLs  * Therapeutic activities to facilitate gross/fine motor skills for increased independence with ADLs  * Positioning to improve skin integrity, interaction with environment and functional independence  * Delirium prevention/treatment    Recommended Adaptive Equipment: front wheeled walker, leg *, long handled sponge*, reacher*, (*issued 4/10)    Comments: Based on patient's functional performance as stated below and level of assistance needed prior to admission, this therapist believes that the patient would benefit from further skilled OT following hospital stay in an effort to increase safety, functional independence, and quality of life. Home Living: Pt lives with son in a 1 story home with 3 steps down and 3 steps up to enter and no rail(s); bed/bath on main floor  Bathroom setup: tub shower, [address], ; standard toilet  Equipment owned: cane, sw, shower chair    Prior Level of Function: some assistance with ADLs and with IADLs; using sw vs cane for ambulation. Driving: yes  Occupation: retired     Pain Level: reports 8/10 back pain at rest; addressed with repositioning and bracing  Cognition: A&O: 4/4; Follows 3 step directions   Memory: G   Sequencing: G   Problem solving: G   Judgement/safety: G     Functional Assessment: AM-PAC Daily Activity Raw Score: 15/24   Initial Eval Status  Date: 4/10/22 Treatment Status  Date: STGs = LTGs  Time frame: 10-14 days   Feeding Set up A        Grooming Set up A  seated    supervision while seated/standing at sink    UB Dressing Mod A  To clint brace in supine; educated regarding spinal neutrality    Min A   LB Dressing Mod A  To clint underpants in supine.  Educated re adaptive equipment, no interest in sock aide or shoe horn    Min A   Bathing Mod A  Simulated; demo'd use of leg  for shower transfers    Min A   Toileting Min A  simulated    Supervision   Bed Mobility  Rolling: SBA  Supine to sit: MIn A  Sit to supine: NT     Functional Transfers Sit to stand: Min A  Stand to sit: Min A  Supervision   Functional Mobility Min A ww  For in-room distance  Mod I for in-home distances   Balance Sitting:     Static:  supervision    Dynamic: Supervision  Standing: CGA ww     Endurance/Activity Tolerance Fair-  Good-   Visual/  Perceptual Glasses: yes, not present in room              Hand Dominance R   AROM (PROM) Strength Additional Info:    JACKSON  WFL WFL good  and wfl FMC/dexterity noted during ADL tasks   NATHALY [facility] WFL good  and wfl FMC/dexterity noted during ADL tasks     Hearing: [facility]  Sensation:  No c/o numbness or tingling  Tone: WNL  Edema: unremarkable                            Comments:  Upon arrival patient supine with HOB slightly elevated, patient seen in conjunction with PT to increase patient participation and safety. LB dressing, bathing, functional transfers, spinal neutrality, donning/doffing TLSO, functional mobility, LB AE, bed mobility addressed. After session, patient seated up in chair. with all devices within reach, all lines and tubes intact. Pt required cues and education as noted above for safe facilitation and completion of tasks. Therapist provided skilled monitoring of patient's response during treatment session. Prior to and at the end of session, environmental modifications/line management completed for patients safety and efficiency of treatment session. Overall, patient demonstrates moderate difficulties with completion of BADLs and IADLs. Factors contributing to these difficulties include need for TLSO d/t back pain, decreased endurance, and generalized weakness. As noted above, patient likely to benefit from further OT intervention to increase independence, safety, and overall quality of life. Eval Complexity:   · Low Complexity    Treatment:   · Bed mobility: Facilitated bed mobility with cues for proper body mechanics and sequencing to prepare for ADL completion. Education and reinforcement of spinal neutral mechanics. · ADL completion: Self-care retraining for the above-mentioned ADLs; training on proper hand placement, safety technique, sequencing, and energy conservation techniques. LB dressing, bathing. Rehab Potential: Good for established goals     Patient / Family Goal: None stated      Patient and/or family were instructed on functional diagnosis, prognosis/goals and OT plan of care. Demonstrated good understanding.      Eval Complexity: Low      Time In: 6293  Time Out: 0817  Total Time: 40 minutes    Min Units   OT Eval Low 97165  X 1    OT Eval Medium 63482      OT Eval High 54922       OT Re-Eval [id number]       Therapeutic Ex [phone]       Therapeutic Activities 42912       ADL/Self Care 45412  25 2   Orthotic Management 59700       Neuro Re-Ed 10905       Non-Billable Time          Evaluation Time additionally includes thorough review of current medical information, gathering information on past medical history/social history and prior level of function, completion of standardized testing/informal observation of tasks, assessment of data and education on plan of care and goals.     Blaire Petersen, OTR/L  [id number]

## 2022-04-10 NOTE — PLAN OF CARE
Problem: Skin Integrity:  Goal: Will show no infection signs and symptoms  Description: Will show no infection signs and symptoms  Outcome: Met This Shift  Goal: Absence of new skin breakdown  Description: Absence of new skin breakdown  Outcome: Met This Shift  Goal: Risk for impaired skin integrity will decrease  Description: Risk for impaired skin integrity will decrease  Outcome: Met This Shift  Goal: Demonstration of wound healing without infection will improve  Description: Demonstration of wound healing without infection will improve  Outcome: Met This Shift  Goal: Complications related to intravenous access or infusion will be avoided or minimized  Description: Complications related to intravenous access or infusion will be avoided or minimized  Outcome: Met This Shift     Problem: Respiratory:  Goal: Respiratory status will improve  Description: Respiratory status will improve  Outcome: Met This Shift  Goal: Ability to maintain normal respiratory secretions will improve  Description: Ability to maintain normal respiratory secretions will improve  Outcome: Met This Shift     Problem: Sensory:  Goal: Pain level will decrease  Description: Pain level will decrease  Outcome: Met This Shift  Goal: Satisfaction with pain management regimen will improve  Description: Satisfaction with pain management regimen will improve  Outcome: Met This Shift     Problem: Infection - Central Venous Catheter-Associated Bloodstream Infection:  Goal: Will show no infection signs and symptoms  Description: Will show no infection signs and symptoms  Outcome: Met This Shift

## 2022-04-10 NOTE — PROGRESS NOTES
Hospitalist Progress Note      SYNOPSIS: Patient admitted on 4/8/2022 for Cord compression [facility])    61 old female with a history of metastatic breast cancer, with metastasis to the right humerus, ribs, liver and spine. She has cord compression at T9. She was transferred to [facility] for neurosurgery consultation. He was noted with a urinary tract infection and initiated on Rocephin    SUBJECTIVE:    Patient seen and examined  Records reviewed. Patient seen eating in chair. Patient is having some back pain. She has her brace in place. No fevers, chills, nausea, vomiting, diarrhea. No other complaints at this time    DIET: ADULT DIET; Regular  CODE: Full Code    Intake/Output Summary (Last 24 hours) at 4/10/2022 1037  Last data filed at 4/10/2022 0852  Gross per 24 hour   Intake 480 ml   Output 1400 ml   Net -920 ml       OBJECTIVE:    /72   Pulse 74   Temp 97.4 °F (36.3 °C) (Temporal)   Resp 18   SpO2 95%       General appearance: No apparent distress, appears stated age and cooperative. HEENT:  Conjunctivae/corneas clear. Normocephalic, atraumatic. Neck: Supple. No jugular venous distention. Respiratory: Clear to auscultation, bilaterally. Equal and symmetric chest excursion with inspiration. Cardiovascular: Regular rate and rhythm. No murmurs, rubs, gallops. Normal S1-S2. Abdomen: Soft, nontender, nondistended  Musculoskeletal: No clubbing, cyanosis, no lower extremity edema, bilaterally. Brisk capillary refill. Skin:  No rashes  on visible skin  Neurologic: awake, alert and following commands     ASSESSMENT:    Principal Problem:    Cord compression [facility])  Active Problems:    Breast cancer metastasized to axillary lymph node (HCC)    Intractable back pain    UTI (urinary tract infection)    Debility  Resolved Problems:    * No resolved hospital problems.  *  Breast CA, recurrent with new metastases     PLAN:    Continue Rocephin at this time, transition to oral medications prior to discharge  Appropriate pain control  Restarted Eliquis for DVT history  PT/OT recommendations  Hematology/oncology Consultation  - Patient with prior diagnosis of metastatic breast cancer. Followed with Sandra Ponce, oncology, prior. Limited records in [address] [place] [address] thorax/abdomen/pelvis, survey for mets  - Bone scan? DISPOSITION: Anticipate discharge in 24-48 hours    Medications:  REVIEWED DAILY    Infusion Medications    sodium chloride       Scheduled Medications    apixaban  5 mg Oral BID    pantoprazole  40 mg Oral QAM AC    hydroCHLOROthiazide  25 mg Oral Daily    metoprolol succinate  50 mg Oral BID    montelukast  10 mg Oral Nightly    pravastatin  20 mg Oral Dinner    sodium chloride flush  10 mL IntraVENous 2 times per day    cefTRIAXone (ROCEPHIN) IV  1,000 mg IntraVENous Q24H    fentaNYL  1 patch TransDERmal Q72H     PRN Meds: albuterol, zolpidem, sodium chloride flush, sodium chloride, promethazine **OR** ondansetron, polyethylene glycol, HYDROmorphone    Labs:     Recent Labs     04/09/22  0500   WBC 12.1*   HGB 10.4*   HCT 32.4*          Recent Labs     04/09/22  0500      K 3.7      CO2 23   BUN 20   CREATININE 0.8   CALCIUM 9.7       No results for input(s): PROT, ALB, ALKPHOS, ALT, AST, BILITOT, AMYLASE, LIPASE in the last 72 hours. No results for input(s): INR in the last 72 hours. No results for input(s): Castro Jemima in the last 72 hours.     Chronic labs:    Lab Results   Component Value Date    CHOL 193 05/30/2017    TRIG 70 05/30/2017    HDL 69 (H) 05/30/2017    TSH 0.361 05/30/2017    INR 0.9 (L) 06/05/2017    LABA1C 6.3 (H) 05/30/2017       Radiology: REVIEWED DAILY    +++++++++++++++++++++++++++++++++++++++++++++++++  DO Umberto Zapata Physician - [facility] [place]  +++++++++++++++++++++++++++++++++++++++++++++++++  NOTE: This report was transcribed using voice recognition software. Every effort was made to ensure accuracy; however, inadvertent computerized transcription errors may be present.

## 2022-04-10 NOTE — PROGRESS NOTES
Nutrition Assessment     Type and Reason for Visit: Initial,Positive Nutrition Screen    Nutrition Recommendations/Plan: Continue Current Diet     Nutrition Assessment:  Pt admitted w/ back pain r/t spinal cord compression. PMH of COPD and breast cancer s/p chemo/XRT w/ mets to bone and liver. She reported ongoing good appetite and denied any recent wt loss. Unable to verify d/t lack of recent EMR wt hx. Pt denied need for ONS at this time. Will continue to monitor. Nutrition Related Findings: Pt A/Ox4, abd WDL, +BS, -1L I/O, trace BLE edema      Current Nutrition Therapies:    Diet NPO    Anthropometric Measures:  · Height: 5' (152.4 cm)  · Current Body Wt: 176 lb (79.8 kg) (4/10 stated, pt reported she weighed herself just prior to admission; UTO current wt d/t many objects on bed)   · BMI: 34.4    Nutrition Diagnosis:   No nutrition diagnosis at this time     Nutrition Interventions:   Food and/or Nutrient Delivery:  Continue Current Diet  Nutrition Education/Counseling:  Education not indicated   Coordination of Nutrition Care:  Continue to monitor while inpatient    Goals:  Pt is to consume >50% of most meals/ONS       Nutrition Monitoring and Evaluation:   Behavioral-Environmental Outcomes:  None Identified   Food/Nutrient Intake Outcomes:  Food and Nutrient Intake  Physical Signs/Symptoms Outcomes:  Biochemical Data,GI Status,Fluid Status or Edema,Nutrition Focused Physical Findings,Skin,Weight     Discharge Planning:     Too soon to determine     Electronically signed by Darin Colmenares RD, LD on 4/10/22 at 2:55 PM EDT    Contact: [phone]

## 2022-04-11 ENCOUNTER — HOSPITAL ENCOUNTER (OUTPATIENT)
Dept: RADIATION ONCOLOGY | Age: 67
Discharge: HOME | End: 2022-04-11

## 2022-04-11 DIAGNOSIS — M54.9 SPINE PAIN: Primary | ICD-10-CM

## 2022-04-11 PROCEDURE — 99999 PR OFFICE/OUTPT VISIT,PROCEDURE ONLY: CPT | Performed by: RADIOLOGY

## 2022-04-11 PROCEDURE — 2580000003 HC RX 258: Performed by: FAMILY MEDICINE

## 2022-04-11 PROCEDURE — 99222 1ST HOSP IP/OBS MODERATE 55: CPT

## 2022-04-11 PROCEDURE — 6360000002 HC RX W HCPCS: Performed by: FAMILY MEDICINE

## 2022-04-11 PROCEDURE — 1200000000 HC SEMI PRIVATE

## 2022-04-11 PROCEDURE — 6360000002 HC RX W HCPCS: Performed by: INTERNAL MEDICINE

## 2022-04-11 PROCEDURE — 6370000000 HC RX 637 (ALT 250 FOR IP): Performed by: INTERNAL MEDICINE

## 2022-04-11 PROCEDURE — 6370000000 HC RX 637 (ALT 250 FOR IP): Performed by: FAMILY MEDICINE

## 2022-04-11 RX ORDER — DEXAMETHASONE 4 MG/1
4 TABLET ORAL EVERY 12 HOURS SCHEDULED
Status: DISCONTINUED | OUTPATIENT
Start: 2022-04-11 | End: 2022-04-27 | Stop reason: HOSPADM

## 2022-04-11 RX ORDER — LORAZEPAM 0.5 MG/1
0.5 TABLET ORAL EVERY 4 HOURS PRN
Status: DISCONTINUED | OUTPATIENT
Start: 2022-04-11 | End: 2022-04-27 | Stop reason: HOSPADM

## 2022-04-11 RX ADMIN — ZOLPIDEM TARTRATE 10 MG: 5 TABLET ORAL at 22:23

## 2022-04-11 RX ADMIN — HYDROMORPHONE HYDROCHLORIDE 1 MG: 1 INJECTION, SOLUTION INTRAMUSCULAR; INTRAVENOUS; SUBCUTANEOUS at 08:57

## 2022-04-11 RX ADMIN — CEFTRIAXONE 1000 MG: 1 INJECTION, POWDER, FOR SOLUTION INTRAMUSCULAR; INTRAVENOUS at 22:10

## 2022-04-11 RX ADMIN — Medication 10 ML: at 13:13

## 2022-04-11 RX ADMIN — PANTOPRAZOLE SODIUM 40 MG: 40 TABLET, DELAYED RELEASE ORAL at 06:07

## 2022-04-11 RX ADMIN — Medication 10 ML: at 17:38

## 2022-04-11 RX ADMIN — METOPROLOL SUCCINATE 50 MG: 50 TABLET, EXTENDED RELEASE ORAL at 22:10

## 2022-04-11 RX ADMIN — APIXABAN 5 MG: 5 TABLET, FILM COATED ORAL at 22:10

## 2022-04-11 RX ADMIN — HYDROMORPHONE HYDROCHLORIDE 1 MG: 1 INJECTION, SOLUTION INTRAMUSCULAR; INTRAVENOUS; SUBCUTANEOUS at 22:10

## 2022-04-11 RX ADMIN — HYDROCHLOROTHIAZIDE 25 MG: 25 TABLET ORAL at 08:59

## 2022-04-11 RX ADMIN — HYDROMORPHONE HYDROCHLORIDE 1 MG: 1 INJECTION, SOLUTION INTRAMUSCULAR; INTRAVENOUS; SUBCUTANEOUS at 17:37

## 2022-04-11 RX ADMIN — Medication 10 ML: at 17:01

## 2022-04-11 RX ADMIN — DEXAMETHASONE 4 MG: 4 TABLET ORAL at 22:10

## 2022-04-11 RX ADMIN — APIXABAN 5 MG: 5 TABLET, FILM COATED ORAL at 08:59

## 2022-04-11 RX ADMIN — PRAVASTATIN SODIUM 20 MG: 20 TABLET ORAL at 17:37

## 2022-04-11 RX ADMIN — METOPROLOL SUCCINATE 50 MG: 50 TABLET, EXTENDED RELEASE ORAL at 08:59

## 2022-04-11 RX ADMIN — Medication 10 ML: at 08:57

## 2022-04-11 RX ADMIN — ONDANSETRON 4 MG: 2 INJECTION INTRAMUSCULAR; INTRAVENOUS at 17:00

## 2022-04-11 RX ADMIN — HYDROMORPHONE HYDROCHLORIDE 1 MG: 1 INJECTION, SOLUTION INTRAMUSCULAR; INTRAVENOUS; SUBCUTANEOUS at 02:51

## 2022-04-11 RX ADMIN — Medication 10 ML: at 22:11

## 2022-04-11 RX ADMIN — HYDROMORPHONE HYDROCHLORIDE 1 MG: 1 INJECTION, SOLUTION INTRAMUSCULAR; INTRAVENOUS; SUBCUTANEOUS at 13:12

## 2022-04-11 ASSESSMENT — PAIN DESCRIPTION - FREQUENCY
FREQUENCY: CONTINUOUS
FREQUENCY: CONTINUOUS

## 2022-04-11 ASSESSMENT — PAIN DESCRIPTION - PROGRESSION
CLINICAL_PROGRESSION: NOT CHANGED
CLINICAL_PROGRESSION: NOT CHANGED

## 2022-04-11 ASSESSMENT — PAIN SCALES - GENERAL
PAINLEVEL_OUTOF10: 7
PAINLEVEL_OUTOF10: 10
PAINLEVEL_OUTOF10: 8
PAINLEVEL_OUTOF10: 10
PAINLEVEL_OUTOF10: 0
PAINLEVEL_OUTOF10: 8
PAINLEVEL_OUTOF10: 8
PAINLEVEL_OUTOF10: 10
PAINLEVEL_OUTOF10: 10
PAINLEVEL_OUTOF10: 8
PAINLEVEL_OUTOF10: 5
PAINLEVEL_OUTOF10: 7

## 2022-04-11 ASSESSMENT — PAIN DESCRIPTION - PAIN TYPE
TYPE: ACUTE PAIN

## 2022-04-11 ASSESSMENT — PAIN - FUNCTIONAL ASSESSMENT
PAIN_FUNCTIONAL_ASSESSMENT: PREVENTS OR INTERFERES SOME ACTIVE ACTIVITIES AND ADLS
PAIN_FUNCTIONAL_ASSESSMENT: PREVENTS OR INTERFERES SOME ACTIVE ACTIVITIES AND ADLS

## 2022-04-11 ASSESSMENT — PAIN DESCRIPTION - DESCRIPTORS
DESCRIPTORS: ACHING;CONSTANT;DISCOMFORT

## 2022-04-11 ASSESSMENT — PAIN DESCRIPTION - ORIENTATION
ORIENTATION: LOWER;MID
ORIENTATION: LOWER;MID

## 2022-04-11 ASSESSMENT — PAIN DESCRIPTION - ONSET
ONSET: ON-GOING
ONSET: ON-GOING

## 2022-04-11 ASSESSMENT — PAIN DESCRIPTION - LOCATION
LOCATION: BACK

## 2022-04-11 NOTE — CARE COORDINATION
4/11/22 Transition of Care: RODOLFO/AASHISH met with patient, daughter and patients mother at the bedside regarding the discharge plan. She resides home with her partially disabled son. She states they care for each other. She has a bedside commode, cane, shower chair and wheeled walker. She is in the process of changing pcp to Dr Porfirio Klein in [facility]. She uses [address] in [facility]. She does not verbalize much about her disease process. She states she has pain if she moves. She is taking the oral pain meds currently. Palliative care and Pain management consults are pending. Oncology consult pending. Per neurosurgery patient can be followed for an outpatient if a biopsy is truly needed. She has a metiport in place. She had chemo /radiation in 2000 and 2012. Brace is at the bedside. She remains on iv rocephin qd. PT 17/24 and OT 15/24. She voices wishing to return home at discharge. She says her family is available to help her. Will continue to follow for further plan of care.  Electronically signed by Daja Palmer RN CM on 4/11/2022 at 11:48 AM

## 2022-04-11 NOTE — PROGRESS NOTES
Hospitalist Progress Note      SYNOPSIS: Patient admitted on 2022 for cord compression    Patient transferred from [facility].  Has a history of metastatic breast cancer. Also history of hypertension, COPD and DVT, chronically anticoagulated on Eliquis. Patient has been having back pain since November with radiation down her legs. She reported some intermittent incontinence of urine over the past month. Diagnosed with urinary tract infection and started on Rocephin. CT revealed extensive metastatic disease to the right humerus, ribs, liver, lymph nodes and thoracic and lumbar spines causing cord compression at T9. Patient was transferred to [facility] for neurosurgery consultation    SUBJECTIVE:    Patient with improved back pain, no new numbness or tingling on extremities, seen with NS at bedside  Denies chills, fevers, chest pain or abdominal pain. Sometimes nauseated. Records reviewed. Stable overnight. No other overnight issues reported. Temp (24hrs), Av.6 °F (36.4 °C), Min:97.4 °F (36.3 °C), Max:97.8 °F (36.6 °C)    DIET: ADULT DIET; Regular  CODE: Full Code    Intake/Output Summary (Last 24 hours) at 2022 0756  Last data filed at 4/10/2022 1841  Gross per 24 hour   Intake 480 ml   Output 600 ml   Net -120 ml       OBJECTIVE:    /75   Pulse 76   Temp 97.8 °F (36.6 °C)   Resp 16   Ht 5' (1.524 m)   Wt 172 lb (78 kg)   SpO2 99%   BMI 33.59 kg/m²     General appearance: No apparent distress, appears stated age and cooperative. HEENT:  Conjunctivae/corneas clear. Neck: Supple. No jugular venous distention. Respiratory: Clear to auscultation bilaterally, normal respiratory effort  Cardiovascular: Regular rate rhythm, normal S1-S2  Abdomen: Soft, nontender, nondistended  Musculoskeletal: No clubbing, cyanosis, no bilateral lower extremity edema. Brisk capillary refill. Moving all extremities.    Skin:  No rashes  on visible skin  Neurologic: awake, alert and following commands     ASSESSMENT and PLAN:    61year-old female with history of metastatic breast cancer who presented with back pain radiated to the legs, urinary incontinence. Cord compression seen on CT. Patient was transferred for further management and Neurosurgery evaluation    Cord compression of the lumbar and thoracic spines secondary to metastatic disease: neurosurgery on the case. Pain control. Palliative care consulted for symptoms management. There is not plan for NS intervention at this moment and patient is not interested on spinal surgery. Metastatic breast cancer: Oncology consulted. Patient has imagines on a disc from [facility], disc is at home. Per patient she has metastasis to the right shoulder and regional LN there. Patient needing biopsy form safest site for further management  DVT secondary to malignancy: on eliquis   Urinary tract infection:  Urine culture + Escherichia coli sensitive to current Tx (ceftriaxone).  Transition to oral cephalosporin on DC  Hypertension: on troprol XL  COPD: c/w breathing treatment              DISPOSITION: home when medically stable     Medications:  REVIEWED DAILY    Infusion Medications    sodium chloride       Scheduled Medications    apixaban  5 mg Oral BID    pantoprazole  40 mg Oral QAM AC    hydroCHLOROthiazide  25 mg Oral Daily    metoprolol succinate  50 mg Oral BID    montelukast  10 mg Oral Nightly    pravastatin  20 mg Oral Dinner    sodium chloride flush  10 mL IntraVENous 2 times per day    cefTRIAXone (ROCEPHIN) IV  1,000 mg IntraVENous Q24H    fentaNYL  1 patch TransDERmal Q72H     PRN Meds: albuterol, zolpidem, sodium chloride flush, sodium chloride, promethazine **OR** ondansetron, polyethylene glycol, HYDROmorphone    Labs:     Recent Labs     04/09/22  0500   WBC 12.1*   HGB 10.4*   HCT 32.4*          Recent Labs     04/09/22  0500      K 3.7      CO2 23   BUN 20   CREATININE 0.8 CALCIUM 9.7       Recent Labs     04/10/22  1441   PROT 8.0   ALKPHOS 230*   ALT 42*   AST 58*   BILITOT 0.3       No results for input(s): INR in the last 72 hours. No results for input(s): Les Melania in the last 72 hours. Chronic labs:    Lab Results   Component Value Date    CHOL 193 05/30/2017    TRIG 70 05/30/2017    HDL 69 (H) 05/30/2017    TSH 0.361 05/30/2017    INR 0.9 (L) 06/05/2017    LABA1C 6.3 (H) 05/30/2017       Radiology: REVIEWED DAILY    +++++++++++++++++++++++++++++++++++++++++++++++++  Manuela Cooper MD  [facility] Physician - [address] [place] [address]  +++++++++++++++++++++++++++++++++++++++++++++++++  NOTE: This report was transcribed using voice recognition software. Every effort was made to ensure accuracy; however, inadvertent computerized transcription errors may be present.

## 2022-04-11 NOTE — CONSULTS
Palliative Care Department  [phone]  Palliative Care Initial Consult  Provider Kim Rutledge, APRN - CNP      PATIENT: Myra Vale  : 1955  MRN: [id number]  ADMISSION DATE: 2022  7:12 PM  Referring Provider: James Sheridan MD    Palliative Medicine was consulted on hospital day 3 for assistance with Goals of care, Symptom management     HPI:     Desi Kendrick is a 61 y.o. y/o female with a history of static breast cancer, anxiety, COPD, depression, diabetes mellitus, hypertension, restless leg syndrome who presented to [facility]) on 2022 as a transfer from [facility] for a neurosurgery consultation due to cord compression at T9 from extensive metastases disease to  thoracic and lumbar spine. After have been evaluated by neurosurgery, patient is not interested in surgical intervention, and off-the-shelf TLSO brace was recommended. ASSESSMENT/PLAN:     Pertinent Hospital Diagnoses      Metastatic breast cancer   Cord compression of the lumbar and thoracic spine secondary to metastatic disease   UTI   Pain due to neoplasm  o Fentanyl 12 mcg patch every 72 hours  o Hydromorphone 1 mg every 4 hours as needed  o Start gabapentin 300 mg at night    Palliative Care Encounter / Counseling Regarding Goals of Care  Please see detailed goals of care discussion as below   At this time, Myra Vale, Does have capacity for medical decision-making. Capacity is time limited and situation/question specific   Outcome of goals of care meeting:  o Continue with current medical treatment  o Wishes for CODE STATUS to be changed to limited DNR CCA DNI  o Does not want aggressive surgical intervention, but is hoping for conservative treatment.     Code status Limited DNR CCA DNI   Advanced Directives: no POA or living will in [facility]   Surrogate/Legal NOK:    Spiritual assessment: no spiritual distress identified  Bereavement and grief: to be determined  Referrals to: none today    Thank you for the opportunity to participate in the care of Myra Vale. KATHIE Tovar CNP  Palliative Medicine     SUBJECTIVE:     Details of Conversation:    Chart reviewed. Met with patient, patient's daughter, and her mother at the bedside. Introduced palliative medicine. Patient stated that she was first diagnosed with breast cancer on left breast in 2009 underwent treatment with radiation and chemotherapy. In 2012 she was diagnosed with right breast cancer, underwent double mastectomy with breast reconstruction, have gone through another round of chemotherapy and radiation as well. She reported of having ongoing pain, and difficulty ambulating for a while, has gone to outpatient physical therapy and had multiple ER visit due to pain. Patient's oncologist is  in [facility], and had requested for patient to have a biopsy to her metastases. We discussed goal of care, patient wishes to be able to transition home, she does value quality over quantity, she does not want to have any surgical intervention done to the cord compression in her spine. We discussed hospice, she does not want to transition to comfort care focus, as she still wants to pursue conservative treatment. She does have questions regarding having the biopsies done while in this hospitalization. We discussed CODE STATUS. She does not want to be resuscitated in a cardiopulmonary arrest, or be placed on vent support short or long-term. She wishes for her CODE STATUS to be changed to limited DNR CCA DNI. She reported her pain currently is manageable 2 out of 10, she has received 4 doses of 1 mg IV Dilaudid in the last 24 hours, she also has a fentanyl patch on.   She does report having tingling and numbness to her lower extremities, also has a history of restless leg syndromes, at one point she was getting Neurontin 600 mg twice daily, for her restless leg syndrome provided by her primary care provider, but have not been on it for almost 2 years. She expressed the best pain management she has noted in the last few days, was the air cushion she is laying on, requesting to see if they can order one for her to take home. She denies having nausea vomiting or diarrhea, had a bowel movement last night. She denies any other symptoms. Going to add Neurontin 300 mg at bedtime. I would recommend for her to follow a palliative medicine clinic as an outpatient [facility]. Comfort support provided. We will reassess her symptoms in the morning. Prognosis: Guarded    OBJECTIVE:     /69   Pulse 77   Temp 97.7 °F (36.5 °C) (Temporal)   Resp 15   Ht 5' (1.524 m)   Wt 172 lb (78 kg)   SpO2 96%   BMI 33.59 kg/m²     Physical Examination:  Gen: elderly, thin, NAD, awake, alert   HEENT: normocephalic, atraumatic, PERRL, EOMI,   Neck: trachea midline, no JVD  Lungs: respirations easy and not labored,   Heart: regular rate and rhythm, distant heart tones,   Abdomen: normoactive bowel sounds, soft, non-tender  Extremities: no clubbing, cyanosis or edema, moving all extremities    Skin: warm, dry without rashes, lesions, bruising  Neuro: awake, alert, oriented x 3, follows commands, no gross neurologic deficit    Objective data reviewed: labs, images, records, medication use, vitals and chart    Time/Communication  Greater than 50% of time spent, total 50 minutes in counseling and coordination of care at the bedside regarding goals of care and symptom management. Thank you for allowing Palliative Medicine to participate in the care of Mendel Minks. Note: This report was completed using [employer] voiced recognition software. Every effort has been made to ensure accuracy; however, inadvertent computerized transcription errors may be present.

## 2022-04-11 NOTE — PROGRESS NOTES
Message sent to Dr. Domitila Dumas requesting anxiety medication per family/patient request.  Awaiting new orders.

## 2022-04-11 NOTE — CONSULTS
Malcolm Leon      Patient Name: Mendel Minks  YOB: 1955  PCP: KATHIE Johnson CNP   Referring Provider: [address] [place] [address] / [place]  [id number]     Reason for Consultation: No chief complaint on file. History of Present Illness: This pt is a 62 yo female who follows with Dr. Brooke Mcfarland in [facility], for history of metastatic breast cancer. Also with hx of DVT on Eliquis. She has been having back pain since 11/21 radiating down her legs. She reported some intermittent incontinence of urine as well. CT revealed extensive metastatic disease to the right humerus, ribs, liver, lymph nodes and thoracic and lumbar spines causing cord compression at T9. Patient was transferred to [facility] for neurosurgery consultation. Seen by Dr. Kiera [place], no NSG intervention. Placed in TLSO brace. CT chest here showing mediastinal LAD and L adrenal nodule as well as multiple bone metastasis with pathologic fracture. On rocephin for UTI.      Diagnostic Data:     Past Medical History:   Diagnosis Date    Anxiety     pt anxious over surgery takes xanax prn    Arthritis     osteo    Asthma     Cancer ([address] 1999    right breast 2012 left breast    COPD (chronic obstructive pulmonary disease) (HCC)     stable per pt no sob    Depression     stable for diagnosis    Diabetes mellitus ([address]     stable per pt    FHx: chemotherapy 2000    Hx of blood clots     left leg, bilat lung PE, 2014    Hyperlipidemia     Hypertension     stable per pt    PONV (postoperative nausea and vomiting)     Radiation 2000    hx of    Reflux     Restless leg syndrome        Patient Active Problem List    Diagnosis Date Noted    UTI (urinary tract infection) 04/09/2022    Debility 04/09/2022    Cord compression ([address] 04/08/2022    Intractable back pain 04/08/2022    S/P mastectomy, bilateral 12/12/2012    Breast cancer metastasized to axillary lymph node ([address] 12/12/2012        Past Surgical History:   Procedure Laterality Date    ABDOMEN SURGERY      colon resection    BREAST RECONSTRUCTION Bilateral 10/23/2015     stage I    BREAST SURGERY  1999    partial right breast mastectomy     BREAST SURGERY  2012    left breast mastectomy    CARPAL TUNNEL RELEASE      right    CHOLECYSTECTOMY      FOOT SURGERY      HYSTERECTOMY  2001    JOINT REPLACEMENT  1999    right total hip    MOHS SURGERY  2011    TOENAIL EXCISION      bilat    TUNNELED VENOUS PORT PLACEMENT      right chest, 2012    VASCULAR SURGERY      stent placed left leg d/t blood clots        Family History  No family history on file. Social History    TOBACCO:   reports that she has quit smoking. She smoked 0.50 packs per day. She has never used smokeless tobacco.  ETOH:   reports no history of alcohol use. Home Medications  Prior to Admission medications    Medication Sig Start Date End Date Taking? Authorizing Provider   pantoprazole (PROTONIX) 40 MG tablet Take 40 mg by mouth daily   Yes Historical Provider, MD   metoprolol succinate (TOPROL XL) 50 MG extended release tablet Take 1 tablet by mouth 2 times daily 8/21/18   John Valdez MD   hydrochlorothiazide (HYDRODIURIL) 25 MG tablet Take 25 mg by mouth daily    Historical Provider, MD   ondansetron (ZOFRAN) 4 MG tablet Take 1 tablet by mouth every 8 hours as needed for Nausea or Vomiting 10/23/15   Mary Alice Dupree MD   montelukast (SINGULAIR) 10 MG tablet Take 10 mg by mouth nightly    Historical Provider, MD   vitamin D (ERGOCALCIFEROL) 33707 UNITS CAPS capsule Take 50,000 Units by mouth once a week Takes on Sundays    Historical Provider, MD   ibuprofen (IBU) 800 MG tablet Take 1 tablet by mouth every 8 hours for 14 days. Must take with food 12/12/12 12/26/12  Denzel Milligan III, MD   pravastatin (PRAVACHOL) 20 MG tablet Take 20 mg by mouth Daily with supper.     Historical Provider, MD   diphenoxylate-atropine (LOMOTIL) 2.5-0.025 MG per tablet Take 1 tablet by mouth as needed. Historical Provider, MD   zolpidem (AMBIEN) 10 MG tablet Take 10 mg by mouth nightly as needed. Historical Provider, MD   albuterol (PROVENTIL;VENTOLIN) 90 MCG/ACT inhaler Inhale 2 puffs into the lungs every 6 hours as needed. Bring with pt     Historical Provider, MD   fluticasone (FLOVENT HFA) 110 MCG/ACT inhaler Inhale 1 puff into the lungs as needed. Bring with pt to or     Historical Provider, MD   albuterol (PROVENTIL) (2.5 MG/3ML) 0.083% nebulizer solution Take 2.5 mg by nebulization every 6 hours as needed. Use in am of surgery if needed     Historical Provider, MD       Allergies  Allergies   Allergen Reactions    Codeine Hives and Nausea And Vomiting    Adhesive Tape      Rash with hives    Darvocet A500 [Propoxyphene N-Acetaminophen]      Rash and emisis       Review of Systems:    Stable but improved back pain. No additional complaints      Objective  /69   Pulse 77   Temp 97.7 °F (36.5 °C) (Temporal)   Resp 15   Ht 5' (1.524 m)   Wt 172 lb (78 kg)   SpO2 96%   BMI 33.59 kg/m²     Physical Exam:   Performance Status:  General: AAO to person, place, time, in no acute distress  Head and neck : PERRLA, EOMI . Sclera non icteric. Oropharynx : Clear  Neck: no JVD,  no adenopathy  LYMPHATICS : No LAD  Heart: Regular rate and regular rhythm, no murmur  Lungs: Clear to auscultation   Extremities: No edema,no cyanosis, no clubbing.    Abdomen: Soft, non-tender;no masses, no organomegaly  Skin:  No rash  Neurologic:Cranial nerves grossly intact    Recent Laboratory Data-   Lab Results   Component Value Date    WBC 12.1 (H) 04/09/2022    HGB 10.4 (L) 04/09/2022    HCT 32.4 (L) 04/09/2022    MCV 89.0 04/09/2022     04/09/2022    LABLYMP 1.5 05/30/2017    LYMPHOPCT 11.4 (L) 04/09/2022    RBC 3.64 04/09/2022    MCH 28.6 04/09/2022    MCHC 32.1 04/09/2022    RDW 14.5 04/09/2022    NEUTOPHILPCT 83.0 (H) 04/09/2022 MONOPCT 5.0 04/09/2022    BASOPCT 0.1 04/09/2022    NEUTROABS 10.06 (H) 04/09/2022    LYMPHSABS 1.38 (L) 04/09/2022    MONOSABS 0.60 04/09/2022    EOSABS 0.00 (L) 04/09/2022    BASOSABS 0.01 04/09/2022       Lab Results   Component Value Date     04/09/2022    K 3.7 04/09/2022     04/09/2022    CO2 23 04/09/2022    BUN 20 04/09/2022    CREATININE 0.8 04/09/2022    GLUCOSE 141 (H) 04/09/2022    CALCIUM 9.7 04/09/2022    PROT 8.0 04/10/2022    LABALBU 4.0 04/10/2022    BILITOT 0.3 04/10/2022    ALKPHOS 230 (H) 04/10/2022    AST 58 (H) 04/10/2022    ALT 42 (H) 04/10/2022    LABGLOM >60 04/09/2022    GFRAA >60 04/09/2022    AGRATIO 0.8 (L) 04/07/2022    GLOB 4.1 (H) 04/07/2022       No results found for: IRON, TIBC, FERRITIN        Radiology-    CT CHEST W WO CONTRAST   Final Result   Enlarged mediastinal lymph nodes are suspicious for lymph node metastases. Prominent but subcentimeter short axis right axillary lymph node is   nonspecific. Left adrenal nodule may represent a metastasis. Multiple bone metastases with pathologic fractures. Recommend contrast   enhanced MRI of the spine for further evaluation. ASSESSMENT/PLAN :  62 yo female  Stage IV breast cancer, follows with Dr. Shai Branham  T9 fracture with cord compression    - Seen by NSG, no plans for spinal surgery. They will review imaging discs from [facility]. Biopsy per NSG to confirm receptor status  - Has TLSO brace  - Start decadron 4 mg q 12 hours  - Would get rad onc opinion as well regarding palliative XRT to the spine  - Check tumor markers  - On rocephin for UTI  - She will follow with Dr. Shai Branham on DC for discussion of next line therapy (unsure of prior lines) including bone agent    Thank you for this consult.  Please call with further questions or concerns      Electronically signed by Gretta Smiley MD on 4/11/2022 at 4:09 PM

## 2022-04-11 NOTE — PROGRESS NOTES
Neurosurg progress note  VITALS:  /69   Pulse 77   Temp 97.7 °F (36.5 °C) (Temporal)   Resp 15   Ht 5' (1.524 m)   Wt 172 lb (78 kg)   SpO2 96%   BMI 33.59 kg/m²   24HR INTAKE/OUTPUT:    Intake/Output Summary (Last 24 hours) at 4/11/2022 1355  Last data filed at 4/11/2022 0923  Gross per 24 hour   Intake 240 ml   Output 600 ml   Net -360 ml     No results found.   CBC:   Lab Results   Component Value Date    WBC 12.1 04/09/2022    RBC 3.64 04/09/2022    HGB 10.4 04/09/2022    HCT 32.4 04/09/2022    MCV 89.0 04/09/2022    MCH 28.6 04/09/2022    MCHC 32.1 04/09/2022    RDW 14.5 04/09/2022     04/09/2022    MPV 9.7 04/09/2022     BMP:    Lab Results   Component Value Date     04/09/2022    K 3.7 04/09/2022     04/09/2022    CO2 23 04/09/2022    BUN 20 04/09/2022    LABALBU 4.0 04/10/2022    CREATININE 0.8 04/09/2022    CALCIUM 9.7 04/09/2022    GFRAA >60 04/09/2022    LABGLOM >60 04/09/2022    LABGLOM >60 05/30/2017    GLUCOSE 141 04/09/2022    GLUCOSE 178 05/30/2017      apixaban  5 mg Oral BID    pantoprazole  40 mg Oral QAM AC    hydroCHLOROthiazide  25 mg Oral Daily    metoprolol succinate  50 mg Oral BID    montelukast  10 mg Oral Nightly    pravastatin  20 mg Oral Dinner    sodium chloride flush  10 mL IntraVENous 2 times per day    cefTRIAXone (ROCEPHIN) IV  1,000 mg IntraVENous Q24H    fentaNYL  1 patch TransDERmal Q72H     Laying comfortably supine in the hospital bed speech is fluent answer questions appropriately awaiting for oncology consult agree with palliative care consult  Assessment:  Patient Active Problem List   Diagnosis    S/P mastectomy, bilateral    Breast cancer metastasized to axillary lymph node ([address]    Cord compression (HCC)    Intractable back pain    UTI (urinary tract infection)    Debility     Plan: No neurosurgical intervention planned await oncology,   Nilesh Caceres MD M.D.

## 2022-04-12 ENCOUNTER — APPOINTMENT (OUTPATIENT)
Dept: MRI IMAGING | Age: 67
DRG: 343 | End: 2022-04-12
Attending: STUDENT IN AN ORGANIZED HEALTH CARE EDUCATION/TRAINING PROGRAM
Payer: COMMERCIAL

## 2022-04-12 LAB
ANION GAP SERPL CALCULATED.3IONS-SCNC: 14 MMOL/L (ref 7–16)
BUN BLDV-MCNC: 20 MG/DL (ref 6–23)
CALCIUM SERPL-MCNC: 9.9 MG/DL (ref 8.6–10.2)
CHLORIDE BLD-SCNC: 87 MMOL/L (ref 98–107)
CO2: 29 MMOL/L (ref 22–29)
CREAT SERPL-MCNC: 0.7 MG/DL (ref 0.5–1)
GFR AFRICAN AMERICAN: >60
GFR NON-AFRICAN AMERICAN: >60 ML/MIN/1.73
GLUCOSE BLD-MCNC: 157 MG/DL (ref 74–99)
POTASSIUM SERPL-SCNC: 3.7 MMOL/L (ref 3.5–5)
SODIUM BLD-SCNC: 130 MMOL/L (ref 132–146)

## 2022-04-12 PROCEDURE — 6360000002 HC RX W HCPCS: Performed by: FAMILY MEDICINE

## 2022-04-12 PROCEDURE — 2580000003 HC RX 258: Performed by: FAMILY MEDICINE

## 2022-04-12 PROCEDURE — 6360000002 HC RX W HCPCS: Performed by: INTERNAL MEDICINE

## 2022-04-12 PROCEDURE — 1200000000 HC SEMI PRIVATE

## 2022-04-12 PROCEDURE — 6360000004 HC RX CONTRAST MEDICATION: Performed by: RADIOLOGY

## 2022-04-12 PROCEDURE — 99232 SBSQ HOSP IP/OBS MODERATE 35: CPT

## 2022-04-12 PROCEDURE — 6370000000 HC RX 637 (ALT 250 FOR IP)

## 2022-04-12 PROCEDURE — 72157 MRI CHEST SPINE W/O & W/DYE: CPT

## 2022-04-12 PROCEDURE — 6370000000 HC RX 637 (ALT 250 FOR IP): Performed by: FAMILY MEDICINE

## 2022-04-12 PROCEDURE — 80048 BASIC METABOLIC PNL TOTAL CA: CPT

## 2022-04-12 PROCEDURE — 99254 IP/OBS CNSLTJ NEW/EST MOD 60: CPT | Performed by: RADIOLOGY

## 2022-04-12 PROCEDURE — 72158 MRI LUMBAR SPINE W/O & W/DYE: CPT

## 2022-04-12 PROCEDURE — A9577 INJ MULTIHANCE: HCPCS | Performed by: RADIOLOGY

## 2022-04-12 PROCEDURE — 36415 COLL VENOUS BLD VENIPUNCTURE: CPT

## 2022-04-12 RX ORDER — GABAPENTIN 300 MG/1
300 CAPSULE ORAL 3 TIMES DAILY
Status: DISCONTINUED | OUTPATIENT
Start: 2022-04-12 | End: 2022-04-27 | Stop reason: HOSPADM

## 2022-04-12 RX ADMIN — MONTELUKAST SODIUM 10 MG: 10 TABLET ORAL at 20:56

## 2022-04-12 RX ADMIN — ONDANSETRON 4 MG: 2 INJECTION INTRAMUSCULAR; INTRAVENOUS at 15:50

## 2022-04-12 RX ADMIN — HYDROMORPHONE HYDROCHLORIDE 1 MG: 1 INJECTION, SOLUTION INTRAMUSCULAR; INTRAVENOUS; SUBCUTANEOUS at 20:55

## 2022-04-12 RX ADMIN — HYDROCHLOROTHIAZIDE 25 MG: 25 TABLET ORAL at 08:05

## 2022-04-12 RX ADMIN — GABAPENTIN 300 MG: 300 CAPSULE ORAL at 20:56

## 2022-04-12 RX ADMIN — Medication 10 ML: at 11:50

## 2022-04-12 RX ADMIN — PANTOPRAZOLE SODIUM 40 MG: 40 TABLET, DELAYED RELEASE ORAL at 06:04

## 2022-04-12 RX ADMIN — HYDROMORPHONE HYDROCHLORIDE 1 MG: 1 INJECTION, SOLUTION INTRAMUSCULAR; INTRAVENOUS; SUBCUTANEOUS at 06:04

## 2022-04-12 RX ADMIN — LORAZEPAM 0.5 MG: 0.5 TABLET ORAL at 20:55

## 2022-04-12 RX ADMIN — Medication 10 ML: at 08:05

## 2022-04-12 RX ADMIN — HYDROMORPHONE HYDROCHLORIDE 1 MG: 1 INJECTION, SOLUTION INTRAMUSCULAR; INTRAVENOUS; SUBCUTANEOUS at 11:50

## 2022-04-12 RX ADMIN — CEFTRIAXONE 1000 MG: 1 INJECTION, POWDER, FOR SOLUTION INTRAMUSCULAR; INTRAVENOUS at 20:56

## 2022-04-12 RX ADMIN — PRAVASTATIN SODIUM 20 MG: 20 TABLET ORAL at 17:49

## 2022-04-12 RX ADMIN — DEXAMETHASONE 4 MG: 4 TABLET ORAL at 20:55

## 2022-04-12 RX ADMIN — Medication 10 ML: at 21:03

## 2022-04-12 RX ADMIN — GABAPENTIN 300 MG: 300 CAPSULE ORAL at 15:50

## 2022-04-12 RX ADMIN — METOPROLOL SUCCINATE 50 MG: 50 TABLET, EXTENDED RELEASE ORAL at 20:55

## 2022-04-12 RX ADMIN — GADOBENATE DIMEGLUMINE 16 ML: 529 INJECTION, SOLUTION INTRAVENOUS at 22:41

## 2022-04-12 RX ADMIN — GABAPENTIN 300 MG: 300 CAPSULE ORAL at 11:50

## 2022-04-12 RX ADMIN — Medication 10 ML: at 15:51

## 2022-04-12 RX ADMIN — DEXAMETHASONE 4 MG: 4 TABLET ORAL at 08:05

## 2022-04-12 RX ADMIN — METOPROLOL SUCCINATE 50 MG: 50 TABLET, EXTENDED RELEASE ORAL at 08:05

## 2022-04-12 ASSESSMENT — PAIN SCALES - GENERAL
PAINLEVEL_OUTOF10: 10
PAINLEVEL_OUTOF10: 10
PAINLEVEL_OUTOF10: 6
PAINLEVEL_OUTOF10: 8
PAINLEVEL_OUTOF10: 10

## 2022-04-12 ASSESSMENT — PAIN DESCRIPTION - ORIENTATION
ORIENTATION: MID
ORIENTATION: MID

## 2022-04-12 ASSESSMENT — PAIN DESCRIPTION - LOCATION
LOCATION: BACK
LOCATION: BACK

## 2022-04-12 ASSESSMENT — PAIN DESCRIPTION - PAIN TYPE
TYPE: ACUTE PAIN
TYPE: ACUTE PAIN

## 2022-04-12 ASSESSMENT — PAIN DESCRIPTION - DESCRIPTORS
DESCRIPTORS: CONSTANT;SHOOTING;SHARP
DESCRIPTORS: CONSTANT;SHARP;DISCOMFORT

## 2022-04-12 ASSESSMENT — PAIN DESCRIPTION - FREQUENCY
FREQUENCY: CONTINUOUS
FREQUENCY: CONTINUOUS

## 2022-04-12 ASSESSMENT — PAIN DESCRIPTION - ONSET: ONSET: ON-GOING

## 2022-04-12 ASSESSMENT — PAIN DESCRIPTION - PROGRESSION: CLINICAL_PROGRESSION: NOT CHANGED

## 2022-04-12 NOTE — PROGRESS NOTES
Hospitalist Progress Note      PCP: KATHIE SOMERS CNP    Date of Admission: 4/8/2022  Days in the hospital: [facility] Course:   Patient transferred from [facility] a history of metastatic breast cancer.  Also history of hypertension, COPD and DVT, chronically anticoagulated on Eliquis.  Patient has been having back pain since November with radiation down her legs.  She reported some intermittent incontinence of urine over the past month.  Diagnosed with urinary tract infection and started on Rocephin.  CT revealed extensive metastatic disease to the right humerus, ribs, liver, lymph nodes and thoracic and lumbar spines causing cord compression at T9.  Patient was transferred to [facility] for neurosurgery consultation. Placed in TLSO brace. CT chest here showing mediastinal LAD and L adrenal nodule as well as multiple bone metastasis with pathologic fracture    Subjective  Patient seen and examined at bedside. Has some mid back pain, scheduled for biopsy on Friday. Able to move her lower extremities. Chart reviewed, night events reviewed. Exam:    /71   Pulse 84   Temp 97.5 °F (36.4 °C) (Temporal)   Resp 14   Ht 5' (1.524 m)   Wt 172 lb (78 kg)   SpO2 98%   BMI 33.59 kg/m²     HEENT: No pallor, no icterus. Respiratory:  CTA, good air entry. Cardiovascular: RRR, no murmur. Abdomen: Soft, non-tender, BS noted. Musculoskeletal: No joint pains or joint swelling noted. Neurologic: awake, alert and following commands       Assessment/Plan:  Cord compression of the lumbar and thoracic spines secondary to metastatic disease: neurosurgery on the case. Pain control. Palliative care consulted for symptoms management. Continue with Decadron, plan is for biopsy on Friday, continue with serial neuro checks. MRI of the T and L-spine ordered. Metastatic breast cancer: Oncology following. Patient has imagines on a disc from [facility], disc is at home. Radiation oncology consulted. DVT secondary to malignancy: on eliquis   Urinary tract infection:  Urine culture + Escherichia coli sensitive to current Tx (ceftriaxone). Transition to oral cephalosporin on DC  Hypertension: on troprol XL  COPD: c/w breathing treatment   Hyponatremia:, Continue to monitor      Labs:   No results for input(s): WBC, HGB, HCT, PLT in the last 72 hours. Recent Labs     04/12/22  0557   *   K 3.7   CL 87*   CO2 29   BUN 20   CREATININE 0.7   CALCIUM 9.9     Recent Labs     04/10/22  1441   AST 58*   ALT 42*   BILIDIR <0.2   BILITOT 0.3   ALKPHOS 230*     No results for input(s): INR in the last 72 hours. No results for input(s): Reyne Romanian in the last 72 hours. Medications:  Reviewed    Infusion Medications    sodium chloride       Scheduled Medications    dexamethasone  4 mg Oral 2 times per day    pantoprazole  40 mg Oral QAM AC    hydroCHLOROthiazide  25 mg Oral Daily    metoprolol succinate  50 mg Oral BID    montelukast  10 mg Oral Nightly    pravastatin  20 mg Oral Dinner    sodium chloride flush  10 mL IntraVENous 2 times per day    cefTRIAXone (ROCEPHIN) IV  1,000 mg IntraVENous Q24H    fentaNYL  1 patch TransDERmal Q72H     PRN Meds: LORazepam, albuterol, zolpidem, sodium chloride flush, sodium chloride, promethazine **OR** ondansetron, polyethylene glycol, HYDROmorphone      Intake/Output Summary (Last 24 hours) at 4/12/2022 0808  Last data filed at 4/12/2022 0609  Gross per 24 hour   Intake 660 ml   Output 1100 ml   Net -440 ml     · Body mass index is 33.59 kg/m². · Diet  · ADULT DIET; Regular    · Code Status  · Limited       Electronically signed by Holli Moreira MD on 4/12/2022 at 8:08 AM  [employer]   Please contact me through [employer]    NOTE: This report was transcribed using voice recognition software. Every effort was made to ensure accuracy; however, inadvertent computerized transcription errors may be present.

## 2022-04-12 NOTE — PLAN OF CARE
Problem: Skin Integrity:  Goal: Will show no infection signs and symptoms  Description: Will show no infection signs and symptoms  Outcome: Met This Shift     Problem: Skin Integrity:  Goal: Absence of new skin breakdown  Description: Absence of new skin breakdown  Outcome: Met This Shift     Problem: Respiratory:  Goal: Respiratory status will improve  Description: Respiratory status will improve  Outcome: Met This Shift     Problem: Falls - Risk of:  Goal: Will remain free from falls  Description: Will remain free from falls  Outcome: Met This Shift     Problem: Falls - Risk of:  Goal: Absence of physical injury  Description: Absence of physical injury  Outcome: Met This Shift     Problem: Infection - Central Venous Catheter-Associated Bloodstream Infection:  Goal: Will show no infection signs and symptoms  Description: Will show no infection signs and symptoms  Outcome: Met This Shift

## 2022-04-12 NOTE — PROGRESS NOTES
Palliative Care Department  [phone]  Palliative Care Progress Note  Provider Eleanor Osuna, APRN - CNP      PATIENT: Rocky Umanzor  : 1955  MRN: [id number]  ADMISSION DATE: 2022  7:12 PM  Referring Provider: Crow Farnsworth MD    Palliative Medicine was consulted on hospital day 4 for assistance with Goals of care, Symptom management     HPI:     Grace Andre is a 61 y.o. y/o female with a history of static breast cancer, anxiety, COPD, depression, diabetes mellitus, hypertension, restless leg syndrome who presented to [facility]) on 2022 as a transfer from [facility] for a neurosurgery consultation due to cord compression at T9 from extensive metastases disease to  thoracic and lumbar spine. After have been evaluated by neurosurgery, patient is not interested in surgical intervention, and off-the-shelf TLSO brace was recommended. 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 every 4 hours as needed   Pain due to neoplasm  o Fentanyl 12 mcg patch every 72 hours  o Hydromorphone 1 mg every 4 hours as needed  o gabapentin 300 mg 3 times daily    Palliative Care Encounter / Counseling Regarding Goals of Care  Please see detailed goals of care discussion as below   At this time, Rocky Umanzor, Does have capacity for medical decision-making. Capacity is time limited and situation/question specific   Outcome of goals of care meeting:  o Continue with current medical treatment  o Wishes for CODE STATUS to be changed to limited DNR CCA DNI  o Does not want aggressive surgical intervention, but is hoping for conservative treatment.     Code status Limited DNR CCA DNI   Advanced Directives: no POA or living will in [facility]   Surrogate/Legal NOK:    Spiritual assessment: no spiritual distress identified  Bereavement and grief: to be determined  Referrals to: none today    Thank you for the opportunity to participate in the care of Lonnie Hodge. KATHIE Amaya CNP  Palliative Medicine     SUBJECTIVE:     Details of Conversation:      Met with patient at the bedside. Patient stated that yesterday after having a shower in the afternoon her pain was 10 out of 10, and since then she has been having a difficulty time controlling it. She has received 5 doses of 1 mg IV Dilaudid in the last 24 hours. Last nigh fentanyl patch was replaced. She still complains of having numbness and tingling to her lower extremities, going to increase her gabapentin to 300 mg 3 times daily. She reported last night she had an episode of acute anxiety, p.o. 0.5 mg lorazepam was ordered as needed. She reports no new concerns. We will reassess her needs in the morning. Prognosis: Guarded    OBJECTIVE:     /71   Pulse 84   Temp 97.5 °F (36.4 °C) (Temporal)   Resp 14   Ht 5' (1.524 m)   Wt 172 lb (78 kg)   SpO2 98%   BMI 33.59 kg/m²     Physical Examination:  Gen: elderly, thin, NAD, awake, alert   HEENT: normocephalic, atraumatic, PERRL, EOMI,   Neck: trachea midline, no JVD  Lungs: respirations easy and not labored,   Heart: regular rate and rhythm, distant heart tones,   Abdomen: normoactive bowel sounds, soft, non-tender  Extremities: no clubbing, cyanosis or edema, moving all extremities    Skin: warm, dry without rashes, lesions, bruising  Neuro: awake, alert, oriented x 3, follows commands, no gross neurologic deficit    Objective data reviewed: labs, images, records, medication use, vitals and chart    Time/Communication  Greater than 50% of time spent, total 25 minutes in counseling and coordination of care at the bedside regarding goals of care and symptom management. Thank you for allowing Palliative Medicine to participate in the care of Lonnie Hodge.     Note: This report was completed using computerize voiced recognition software. Every effort has been made to ensure accuracy; however, inadvertent computerized transcription errors may be present.

## 2022-04-12 NOTE — CARE COORDINATION
4/12, RODOLFO was contacted by [place] from [facility]. Faxed requested information to [phone] and phone number is [phone]. RODOLFO to follow for further discharge planning needs.       Jennifer Asencio, [facility] Case Management  [phone]

## 2022-04-12 NOTE — CONSULTS
Radiation Oncology      [facility]. Gaye Gonzalez 52      Referring Physician: Dr. Elizabeth Jarquin      Primary Care Physician:KACIE LEE, KATHIE - NIKOLAS   Primary Oncologist: Dr. Teresa Dimas      Diagnosis: AJCC SG IV breast cancer, biopsy pending. Service:  Radiation Oncology consultation performed on 4/11/22        HPI:        Kandy Ganser is a pelasnt 61year old with back pain, HO breast CA - concerning T9 lesion. The patient presents today to discuss fractionated external beam radiation therapy as a component of multidisciplinary, palliative management. We reviewed the available medical records including the complete medical history of this pt today prior to consultation. Epic -CE and available scanned documents per the Epic Media tab were reviewed PRN. A complete ROS was also performed today and is noted below. During consultation today I personally discussed the pts workup to date; including but not limited to applicable imaging studies, Pathology reports, and interventions. The NCCN guidelines, as pertaining to the above diagnosis were also recapped for the pt today in brief. Today, Andrea Suero  notes Sx that include pain. KPS 70-80 priro to admit.        -----    Per [address]:         History of Present Illness: This pt is a 62 yo female who follows with Dr. Cecille Victor in [place], for history of metastatic breast cancer. Also with hx of DVT on Eliquis. She has been having back pain since 11/21 radiating down her legs. She reported some intermittent incontinence of urine as well. CT revealed extensive metastatic disease to the right humerus, ribs, liver, lymph nodes and thoracic and lumbar spines causing cord compression at T9. Patient was transferred to [facility] for neurosurgery consultation. Seen by Dr. Wong Matta, no NSG intervention. Placed in TLSO brace.  CT chest here showing mediastinal LAD and L adrenal nodule as well as multiple bone metastasis with pathologic fracture. On rocephin for UTI. ASSESSMENT/PLAN :  62 yo female  Stage IV breast cancer, follows with Dr. Saad Osman  T9 fracture with cord compression     - Seen by NSG, no plans for spinal surgery. They will review imaging discs from [facility]. Biopsy per NSG to confirm receptor status  - Has TLSO brace  - Start decadron 4 mg q 12 hours  - Would get rad onc opinion as well regarding palliative XRT to the spine  - Check tumor markers  - On rocephin for UTI  - She will follow with Dr. Saad Osman on [place] for discussion of next line therapy (unsure of prior lines) including bone agent      -----      Past Medical History:   Diagnosis Date    Anxiety     pt anxious over surgery takes xanax prn    Arthritis     osteo    Asthma     Cancer ([address] 1999    right breast 2012 left breast    COPD (chronic obstructive pulmonary disease) ([address]     stable per pt no sob    Depression     stable for diagnosis    Diabetes mellitus ([address]     stable per pt    FHx: chemotherapy 2000    Hx of blood clots     left leg, bilat lung PE, 2014    Hyperlipidemia     Hypertension     stable per pt    PONV (postoperative nausea and vomiting)     Radiation 2000    hx of    Reflux     Restless leg syndrome        Past Surgical History:   Procedure Laterality Date    ABDOMEN SURGERY      colon resection    BREAST RECONSTRUCTION Bilateral 10/23/2015     stage I    BREAST SURGERY  1999    partial right breast mastectomy     BREAST SURGERY  2012    left breast mastectomy    CARPAL TUNNEL RELEASE      right    CHOLECYSTECTOMY      FOOT SURGERY      HYSTERECTOMY  2001    JOINT REPLACEMENT  1999    right total hip    MOHS SURGERY  2011    TOENAIL EXCISION      bilat    TUNNELED VENOUS PORT PLACEMENT      right chest, 2012    VASCULAR SURGERY      stent placed left leg d/t blood clots        No family history on file.     Current Facility-Administered Medications   Medication Dose Route Frequency Provider Last Rate Last Admin    gabapentin (NEURONTIN) capsule 300 mg  300 mg Oral TID Kim CADY Mónicaelicia, APRN - CNP   300 mg at 04/12/22 1550    dexamethasone (DECADRON) tablet 4 mg  4 mg Oral 2 times per day Rossi Gifford MD   4 mg at 04/12/22 0805    LORazepam (ATIVAN) tablet 0.5 mg  0.5 mg Oral Q4H PRN Maria E Salas MD        pantoprazole (PROTONIX) tablet 40 mg  40 mg Oral QAM AC Cindy Ley, DO   40 mg at 04/12/22 0604    albuterol (PROVENTIL) nebulizer solution 2.5 mg  2.5 mg Nebulization Q6H PRN Cindy Ley, DO        hydroCHLOROthiazide (HYDRODIURIL) tablet 25 mg  25 mg Oral Daily Cindy Ley, DO   25 mg at 04/12/22 0805    metoprolol succinate (TOPROL XL) extended release tablet 50 mg  50 mg Oral BID Cindy Ley, DO   50 mg at 04/12/22 0805    montelukast (SINGULAIR) tablet 10 mg  10 mg Oral Nightly Cindy Jil, DO   10 mg at 04/10/22 2034    zolpidem (AMBIEN) tablet 10 mg  10 mg Oral Nightly PRN Cindy Ley, DO   10 mg at 04/11/22 2223    pravastatin (PRAVACHOL) tablet 20 mg  20 mg Oral Dinner Cindy Ley, DO   20 mg at 04/11/22 1737    sodium chloride flush 0.9 % injection 10 mL  10 mL IntraVENous 2 times per day Cindy Ley, DO   10 mL at 04/12/22 0805    sodium chloride flush 0.9 % injection 10 mL  10 mL IntraVENous PRN Cindy Ley, DO   10 mL at 04/12/22 1551    0.9 % sodium chloride infusion   IntraVENous PRN Cindy Ley DO        promethazine (PHENERGAN) tablet 12.5 mg  12.5 mg Oral Q6H PRN Cindy Ley DO        Or    ondansetron [facility]) injection 4 mg  4 mg IntraVENous Q6H PRN Cindy Ley, DO   4 mg at 04/12/22 1550    polyethylene glycol (GLYCOLAX) packet 17 g  17 g Oral Daily PRN Cindy Ley, DO        cefTRIAXone (ROCEPHIN) 1,000 mg in sterile water 10 mL IV syringe  1,000 mg IntraVENous Q24H Cindy Ley DO   1,000 mg at 04/11/22 2210    HYDROmorphone (DILAUDID) injection 1 mg  1 mg IntraVENous Q4H PRN Cindy Ley,    1 mg at 04/12/22 1150    fentaNYL (DURAGESIC) 12 MCG/HR 1 patch  1 patch TransDERmal Q72H Elena Dine, DO   1 patch at 04/11/22 2310       Allergies   Allergen Reactions    Codeine Hives and Nausea And Vomiting    Adhesive Tape      Rash with hives    Darvocet A500 [Propoxyphene N-Acetaminophen]      Rash and emisis       Social History     Socioeconomic History    Marital status: Single     Spouse name: Not on file    Number of children: Not on file    Years of education: Not on file    Highest education level: Not on file   Occupational History    Not on file   Tobacco Use    Smoking status: Former Smoker     Packs/day: 0.50    Smokeless tobacco: Never Used   Substance and Sexual Activity    Alcohol use: No    Drug use: No    Sexual activity: Not on file   Other Topics Concern    Not on file   Social History Narrative    Not on file     Social Determinants of Health     Financial Resource Strain:     Difficulty of Paying Living Expenses: Not on file   Food Insecurity:     Worried About Running Out of Food in the Last Year: Not on file    Amol of Food in the Last Year: Not on file   Transportation Needs:     Lack of Transportation (Medical): Not on file    Lack of Transportation (Non-Medical):  Not on file   Physical Activity:     Days of Exercise per Week: Not on file    Minutes of Exercise per Session: Not on file   Stress:     Feeling of Stress : Not on file   Social Connections:     Frequency of Communication with Friends and Family: Not on file    Frequency of Social Gatherings with Friends and Family: Not on file    Attends Confucianism Services: Not on file    Active Member of Clubs or Organizations: Not on file    Attends Club or Organization Meetings: Not on file    Marital Status: Not on file   Intimate Partner Violence:     Fear of Current or Ex-Partner: Not on file    Emotionally Abused: Not on file    Physically Abused: Not on file    Sexually Abused: Not on file Housing Stability:     Unable to Pay for Housing in the Last Year: Not on file    Number of Places Lived in the Last Year: Not on file    Unstable Housing in the Last Year: Not on file         Review of Systems - History obtained from chart review and the patient  General ROS: positive for  - fatigue  Psychological ROS: negative  Ophthalmic ROS: negative  ENT ROS: negative  Allergy and Immunology ROS: negative  Hematological and Lymphatic ROS: negative  Endocrine ROS: negative  Breast ROS: negative for breast lumps  Respiratory ROS: no cough, shortness of breath, or wheezing  Cardiovascular ROS: no chest pain or dyspnea on exertion  Gastrointestinal ROS: no abdominal pain, change in bowel habits, or black or bloody stools  Genito-Urinary ROS: no dysuria, trouble voiding, or hematuria (pt denies change in incontinence or saddle anesthesia)  Musculoskeletal ROS: positive for - joint pain and severe back pain  Neurological ROS: negative for - numbness/tingling or weakness  Dermatological ROS: negative        Physical Exam  HENT:      Head: Normocephalic and atraumatic. Right Ear: External ear normal.      Left Ear: External ear normal.      Nose: Nose normal.      Mouth/Throat:      Mouth: Mucous membranes are moist.   Eyes:      Extraocular Movements: Extraocular movements intact. Pupils: Pupils are equal, round, and reactive to light. Cardiovascular:      Rate and Rhythm: Normal rate and regular rhythm. Pulses: Normal pulses. Heart sounds: Normal heart sounds. Pulmonary:      Effort: Pulmonary effort is normal.   Abdominal:      General: Abdomen is flat. Musculoskeletal:         General: Normal range of motion. Cervical back: Normal range of motion. Skin:     General: Skin is warm. Neurological:      General: No focal deficit present. Mental Status: She is alert and oriented to person, place, and time. Motor: No weakness.       Gait: Gait normal.   Psychiatric: Mood and Affect: Mood normal.         Behavior: Behavior normal.         Thought Content: Thought content normal.         Judgment: Judgment normal.             Imaging reviewed:        CT 4/10/22:  Impression   Enlarged mediastinal lymph nodes are suspicious for lymph node metastases. Prominent but subcentimeter short axis right axillary lymph node is   nonspecific.       Left adrenal nodule may represent a metastasis.       Multiple bone metastases with pathologic fractures.  Recommend contrast   enhanced MRI of the spine for further evaluation. Radiation Safety and Treatment Support:  -previous Radiation history: No  -history of connective tissue disease: No  -history of autoimmune disease: No  -pregnant: not applicable  -fertility conservation and /or contraception discussed: no  -nutrition consult prior to [address]: Yes  -PEG: No  -Dental evaluation prior to treatment:No  -Social Work requested: Yes  -Oncology Nurse Navigator requested: Yes  -pre + post treatment PT / Rehab / PM+R evaluation considered: Yes  -ICD: No   -ICD brand: -  -[facility] patient navigator: Sharyle Ferri  -Nurse Practitioners for Radiation Oncology:    ---Edinson Resendez, MSN, RN, FNP-C   ---KARIE Andrews, RN, FNP-BC        Assessment and Plan: Red Peck is a pleasant and cooperative 61year old with a recent diagnosis of AJCC stage group IV breast cnacer - probbale. We reomcedn fractionated external beam radiation therapy for pallaition, outcome per 1487. NCCN and Agueda et al were disucsed however the pt would prefer to avoid a spine surgery. Outcomes discussed. The risks, benefits, alternatives, process and logistics of external beam radiation were reviewed today. We answered all of the patient's questions to the best of our ability. Red Peck verbalized understanding and seemed satisfied.  Radiation planning will commence within 1 day; the next step in management being the simulation scan, with external beam radiation to commence in a timely fashion thereafter. It was a pleasure meeting Alec today and we appreciate the referral and opportunity to be involved in her care. We had an extensive discussion today regarding the course to date (including a focused review of theapplicable radiographic and laboratory information), multidisciplinary approach to cancer care, and indications for external beam radiation therapy as a component therein. A literature review and multidisciplinary discussion was performed after seeing this patient due to the complexity of the medical decision making in this case. I personally spent greater than 85 minutes on this case and with this patient. I performed the complete history and physical as above at today's visit, at least 45 minutes was in direct discussion and  regarding disease management. -MRI spine pending  -biopsy pending        -----      Addendum 4/13/22:      MRI:  Impression   Extensive metastatic disease throughout the thoracic spine with resulting   pathologic compression diffusely at T9 which also shows bulging posterior   cortical contour.  There is resulting moderate bilateral T9-10 foraminal   stenosis. -spoke with Dr. Liza Bernardo by phone this AM  -care coordinated with Neuro Surgery by phone 4/13/22, pt is not a good candidate for surgical intervantion due to extent of disease  -biopsy pending as requested by [address]  -will treat the ~T8-T10 region (STAT) with conformal, image guided RT for palliation to which she is amenable then she will FU with Dr. Liza Bernardo at [facility]        -----      -DEX 4 mg q6, transition to PO at D/C  -RT IP or OP, D/C per primary  -sim today, treat today      Juliette Anguiano.  MD Cal Navar[facility] Syed  Oncology  Cell: [phone]    [facility]:  [facility]: [phone]  [address] [place] [address] Street:  [phone]   FAX:    [phone]  [address] [place] [address] Road:  [phone]   FAX:  [phone]        NOTE: This report was transcribed using voice recognition software. Every effort was made to ensure accuracy; however, inadvertent computerized transcription errors may be present.

## 2022-04-12 NOTE — CARE COORDINATION
4/12, Palliative is on for hospital-since they do not go to [facility] referral was placed to [facility] at [phone]. [address] [place] [address] phone number is [phone]. [facility] to contact patient by phone. [facility] would like to come to hospital and do own eval on patient. Call placed to [facility] on [address] to deliver [employer] to room. Met with patient and family in room. Informed patient that [facility] would be contacting patient and that [employer] would be delivered to the room. SW to follow for further discharge planning needs.     Tylor Payne [facility]  PHYSICIANS [facility] SURGICAL [facility] Case Management  [phone]

## 2022-04-12 NOTE — PROGRESS NOTES
Radiation Oncology          -see IP note        [place].  Arianne Lombardo MD [place] [address] Oncology  Cell: [phone]    [facility]:  [phone]   FAX: [phone]  [facility]:  [address] [place] [address] Avenue:    [phone]  [facility]:  [address] [place] [address] Road:  [phone]

## 2022-04-12 NOTE — PLAN OF CARE
Problem: Skin Integrity:  Goal: Will show no infection signs and symptoms  Description: Will show no infection signs and symptoms  Outcome: Met This Shift     Problem: Skin Integrity:  Goal: Absence of new skin breakdown  Description: Absence of new skin breakdown  Outcome: Met This Shift     Problem: Health Behavior:  Goal: Identification of resources available to assist in meeting health care needs will improve  Description: Identification of resources available to assist in meeting health care needs will improve  Outcome: Met This Shift     Problem: Health Behavior:  Goal: Ability to state signs and symptoms to report to health care provider will improve  Description: Ability to state signs and symptoms to report to health care provider will improve  Outcome: Met This Shift     Problem: Falls - Risk of:  Goal: Will remain free from falls  Description: Will remain free from falls  Outcome: Met This Shift     Problem: Falls - Risk of:  Goal: Absence of physical injury  Description: Absence of physical injury  Outcome: Met This Shift     Problem: Infection - Central Venous Catheter-Associated Bloodstream Infection:  Goal: Will show no infection signs and symptoms  Description: Will show no infection signs and symptoms  Outcome: Met This Shift

## 2022-04-12 NOTE — PATIENT INSTRUCTIONS
LAURA Smith. MD [place] [address] Oncology  Cell: [phone]    [facility]:  [phone]   FAX: [phone]  [facility]:  [address] [place] [address] Avenue:    [phone]  [address] [place] [address] Road:  [phone]   FAX:  [phone]  Email: [email or web address]

## 2022-04-13 ENCOUNTER — HOSPITAL ENCOUNTER (OUTPATIENT)
Dept: RADIATION ONCOLOGY | Age: 67
Discharge: HOME | End: 2022-04-13
Attending: RADIOLOGY
Payer: COMMERCIAL

## 2022-04-13 LAB
ANION GAP SERPL CALCULATED.3IONS-SCNC: 18 MMOL/L (ref 7–16)
BUN BLDV-MCNC: 26 MG/DL (ref 6–23)
CALCIUM SERPL-MCNC: 9.7 MG/DL (ref 8.6–10.2)
CHLORIDE BLD-SCNC: 84 MMOL/L (ref 98–107)
CO2: 25 MMOL/L (ref 22–29)
CREAT SERPL-MCNC: 0.7 MG/DL (ref 0.5–1)
GFR AFRICAN AMERICAN: >60
GFR NON-AFRICAN AMERICAN: >60 ML/MIN/1.73
GLUCOSE BLD-MCNC: 275 MG/DL (ref 74–99)
POTASSIUM SERPL-SCNC: 2.5 MMOL/L (ref 3.5–5)
SODIUM BLD-SCNC: 127 MMOL/L (ref 132–146)

## 2022-04-13 PROCEDURE — 2580000003 HC RX 258: Performed by: FAMILY MEDICINE

## 2022-04-13 PROCEDURE — 6370000000 HC RX 637 (ALT 250 FOR IP): Performed by: FAMILY MEDICINE

## 2022-04-13 PROCEDURE — 99231 SBSQ HOSP IP/OBS SF/LOW 25: CPT

## 2022-04-13 PROCEDURE — 80048 BASIC METABOLIC PNL TOTAL CA: CPT

## 2022-04-13 PROCEDURE — L0464 TLSO 4MOD SACRO-SCAP PRE: HCPCS

## 2022-04-13 PROCEDURE — 6360000002 HC RX W HCPCS: Performed by: FAMILY MEDICINE

## 2022-04-13 PROCEDURE — 77014 PR CT GUIDANCE PLACEMENT RAD THERAPY FIELDS: CPT | Performed by: RADIOLOGY

## 2022-04-13 PROCEDURE — 77334 RADIATION TREATMENT AID(S): CPT | Performed by: RADIOLOGY

## 2022-04-13 PROCEDURE — 6370000000 HC RX 637 (ALT 250 FOR IP)

## 2022-04-13 PROCEDURE — 6370000000 HC RX 637 (ALT 250 FOR IP): Performed by: INTERNAL MEDICINE

## 2022-04-13 PROCEDURE — 77263 THER RADIOLOGY TX PLNG CPLX: CPT | Performed by: RADIOLOGY

## 2022-04-13 PROCEDURE — 77387 GUIDANCE FOR RADJ TX DLVR: CPT | Performed by: RADIOLOGY

## 2022-04-13 PROCEDURE — 6360000002 HC RX W HCPCS: Performed by: NEUROLOGICAL SURGERY

## 2022-04-13 PROCEDURE — 2140000000 HC CCU INTERMEDIATE R&B

## 2022-04-13 PROCEDURE — 6360000002 HC RX W HCPCS: Performed by: INTERNAL MEDICINE

## 2022-04-13 PROCEDURE — 36415 COLL VENOUS BLD VENIPUNCTURE: CPT

## 2022-04-13 PROCEDURE — 77290 THER RAD SIMULAJ FIELD CPLX: CPT | Performed by: RADIOLOGY

## 2022-04-13 PROCEDURE — 77307 TELETHX ISODOSE PLAN CPLX: CPT | Performed by: RADIOLOGY

## 2022-04-13 PROCEDURE — 86300 IMMUNOASSAY TUMOR CA 15-3: CPT

## 2022-04-13 PROCEDURE — 77412 RADIATION TX DELIVERY LVL 3: CPT | Performed by: RADIOLOGY

## 2022-04-13 RX ORDER — POTASSIUM CHLORIDE 7.45 MG/ML
10 INJECTION INTRAVENOUS
Status: COMPLETED | OUTPATIENT
Start: 2022-04-13 | End: 2022-04-14

## 2022-04-13 RX ORDER — POTASSIUM CHLORIDE 7.45 MG/ML
10 INJECTION INTRAVENOUS
Status: DISCONTINUED | OUTPATIENT
Start: 2022-04-13 | End: 2022-04-13 | Stop reason: CLARIF

## 2022-04-13 RX ORDER — POTASSIUM CHLORIDE 20 MEQ/1
40 TABLET, EXTENDED RELEASE ORAL ONCE
Status: COMPLETED | OUTPATIENT
Start: 2022-04-13 | End: 2022-04-13

## 2022-04-13 RX ADMIN — CEFTRIAXONE 1000 MG: 1 INJECTION, POWDER, FOR SOLUTION INTRAMUSCULAR; INTRAVENOUS at 20:55

## 2022-04-13 RX ADMIN — METOPROLOL SUCCINATE 50 MG: 50 TABLET, EXTENDED RELEASE ORAL at 20:55

## 2022-04-13 RX ADMIN — MONTELUKAST SODIUM 10 MG: 10 TABLET ORAL at 20:55

## 2022-04-13 RX ADMIN — POTASSIUM CHLORIDE 10 MEQ: 7.46 INJECTION, SOLUTION INTRAVENOUS at 22:26

## 2022-04-13 RX ADMIN — POTASSIUM CHLORIDE 10 MEQ: 7.46 INJECTION, SOLUTION INTRAVENOUS at 23:24

## 2022-04-13 RX ADMIN — ZOLPIDEM TARTRATE 10 MG: 5 TABLET ORAL at 21:10

## 2022-04-13 RX ADMIN — GABAPENTIN 300 MG: 300 CAPSULE ORAL at 13:09

## 2022-04-13 RX ADMIN — HYDROCHLOROTHIAZIDE 25 MG: 25 TABLET ORAL at 10:03

## 2022-04-13 RX ADMIN — GABAPENTIN 300 MG: 300 CAPSULE ORAL at 20:55

## 2022-04-13 RX ADMIN — METOPROLOL SUCCINATE 50 MG: 50 TABLET, EXTENDED RELEASE ORAL at 10:03

## 2022-04-13 RX ADMIN — HYDROMORPHONE HYDROCHLORIDE 1 MG: 1 INJECTION, SOLUTION INTRAMUSCULAR; INTRAVENOUS; SUBCUTANEOUS at 18:12

## 2022-04-13 RX ADMIN — Medication 10 ML: at 20:55

## 2022-04-13 RX ADMIN — DEXAMETHASONE 4 MG: 4 TABLET ORAL at 10:03

## 2022-04-13 RX ADMIN — PANTOPRAZOLE SODIUM 40 MG: 40 TABLET, DELAYED RELEASE ORAL at 06:22

## 2022-04-13 RX ADMIN — POTASSIUM CHLORIDE 10 MEQ: 7.46 INJECTION, SOLUTION INTRAVENOUS at 18:13

## 2022-04-13 RX ADMIN — Medication 10 ML: at 10:04

## 2022-04-13 RX ADMIN — POTASSIUM CHLORIDE 40 MEQ: 20 TABLET, EXTENDED RELEASE ORAL at 18:13

## 2022-04-13 RX ADMIN — HYDROMORPHONE HYDROCHLORIDE 1 MG: 1 INJECTION, SOLUTION INTRAMUSCULAR; INTRAVENOUS; SUBCUTANEOUS at 13:09

## 2022-04-13 RX ADMIN — GABAPENTIN 300 MG: 300 CAPSULE ORAL at 10:03

## 2022-04-13 RX ADMIN — POTASSIUM CHLORIDE 10 MEQ: 7.46 INJECTION, SOLUTION INTRAVENOUS at 21:16

## 2022-04-13 RX ADMIN — DEXAMETHASONE 4 MG: 4 TABLET ORAL at 21:10

## 2022-04-13 RX ADMIN — ONDANSETRON 4 MG: 2 INJECTION INTRAMUSCULAR; INTRAVENOUS at 13:21

## 2022-04-13 ASSESSMENT — PAIN DESCRIPTION - DESCRIPTORS: DESCRIPTORS: DISCOMFORT;DULL;NAGGING

## 2022-04-13 ASSESSMENT — PAIN DESCRIPTION - PAIN TYPE: TYPE: ACUTE PAIN

## 2022-04-13 ASSESSMENT — PAIN DESCRIPTION - ORIENTATION: ORIENTATION: MID

## 2022-04-13 ASSESSMENT — PAIN DESCRIPTION - ONSET: ONSET: PROGRESSIVE

## 2022-04-13 ASSESSMENT — PAIN DESCRIPTION - LOCATION: LOCATION: BACK

## 2022-04-13 ASSESSMENT — PAIN DESCRIPTION - FREQUENCY: FREQUENCY: CONTINUOUS

## 2022-04-13 ASSESSMENT — PAIN SCALES - GENERAL
PAINLEVEL_OUTOF10: 0
PAINLEVEL_OUTOF10: 9
PAINLEVEL_OUTOF10: 3

## 2022-04-13 ASSESSMENT — PAIN - FUNCTIONAL ASSESSMENT: PAIN_FUNCTIONAL_ASSESSMENT: PREVENTS OR INTERFERES SOME ACTIVE ACTIVITIES AND ADLS

## 2022-04-13 ASSESSMENT — PAIN DESCRIPTION - PROGRESSION: CLINICAL_PROGRESSION: NOT CHANGED

## 2022-04-13 NOTE — PROGRESS NOTES
Palliative Care Department  [phone]  Palliative Care Progress Note  Provider Eleanor Osuna, APRN - CNP      PATIENT: Rocky Umanzor  : 1955  MRN: [id number]  ADMISSION DATE: 2022  7:12 PM  Referring Provider: Crow Farnsworth MD    Palliative Medicine was consulted on hospital day 5 for assistance with Goals of care, Symptom management     HPI:     Grace Andre is a 61 y.o. y/o female with a history of static breast cancer, anxiety, COPD, depression, diabetes mellitus, hypertension, restless leg syndrome who presented to [facility] on 2022 as a transfer from [facility] for a neurosurgery consultation due to cord compression at T9 from extensive metastases disease to  thoracic and lumbar spine. After have been evaluated by neurosurgery, patient is not interested in surgical intervention, and off-the-shelf TLSO brace was recommended. 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 every 4 hours as needed   Pain due to neoplasm  o Fentanyl 12 mcg patch every 72 hours  o Hydromorphone 1 mg every 4 hours as needed  o gabapentin 300 mg 3 times daily    Palliative Care Encounter / Counseling Regarding Goals of Care  Please see detailed goals of care discussion as below   At this time, Rocky Umanzor, Does have capacity for medical decision-making. Capacity is time limited and situation/question specific   Outcome of goals of care meeting:  o Continue with current medical treatment  o Wishes for CODE STATUS to be changed to limited DNR CCA DNI  o Does not want aggressive surgical intervention, but is hoping for conservative treatment.     Code status Limited DNR CCA DNI   Advanced Directives: no POA or living will in [facility]   Surrogate/Legal NOK:    Spiritual assessment: no spiritual distress identified  Bereavement and grief: to be determined  Referrals to: none today    Thank you for the opportunity to participate in the care of Renetta Lang. KATHIE Mg CNP  Palliative Medicine     SUBJECTIVE:     Details of Conversation:      Chart was reviewed. Patient has MRI of thoracic and lumbar spine that shows metastatic disease. Saw patient at the bedside. She reported her pain has been well controlled with current treatment. She has not received a as needed pain medication since last night. She has no new complaints. We will continue to follow. Prognosis: Guarded    OBJECTIVE:     /73   Pulse 76   Temp 97.3 °F (36.3 °C) (Temporal)   Resp 16   Ht 5' (1.524 m)   Wt 172 lb (78 kg)   SpO2 99%   BMI 33.59 kg/m²     Physical Examination:  Gen: elderly, thin, NAD, awake, alert   HEENT: normocephalic, atraumatic, PERRL, EOMI,   Neck: trachea midline, no JVD  Lungs: respirations easy and not labored,   Heart: regular rate and rhythm, distant heart tones,   Abdomen: normoactive bowel sounds, soft, non-tender  Extremities: no clubbing, cyanosis or edema, moving all extremities    Skin: warm, dry without rashes, lesions, bruising  Neuro: awake, alert, oriented x 3, follows commands, no gross neurologic deficit    Objective data reviewed: labs, images, records, medication use, vitals and chart    Time/Communication  Greater than 50% of time spent, total 15 minutes in counseling and coordination of care at the bedside regarding goals of care and symptom management. Thank you for allowing Palliative Medicine to participate in the care of Renetta Lang. Note: This report was completed using [employer] voiced recognition software. Every effort has been made to ensure accuracy; however, inadvertent computerized transcription errors may be present.

## 2022-04-13 NOTE — CARE COORDINATION
4/13/22 Update CM Note: Spoke with [facility] who has a palliative/hospice program in the area of [place] [id number]. All demos/clinical faxed to [facility] a [phone]. Will review and respond if able to accept at discharge.  Electronically signed by Mary Ann Perdomo RN CM on 4/13/2022 at 10:49 AM

## 2022-04-13 NOTE — CARE COORDINATION
4/13/22 Update CM Note; Spoke with Washington Adams at [facility]. They use [facility] Palliative care for their outpatient pall. Care needs.  She will contact [facility] and have them call BECKY Electronically signed by Sanjuanita Moran RN CM on 4/13/2022 at 1:37 PM

## 2022-04-13 NOTE — PROGRESS NOTES
Hospitalist Progress Note      PCP: KATHIE SOMERS CNP    Date of Admission: 4/8/2022  Days in the hospital: [address] [facility] Course:   Patient transferred from [facility] a history of metastatic breast cancer.  Also history of hypertension, COPD and DVT, chronically anticoagulated on Eliquis.  Patient has been having back pain since November with radiation down her legs.  She reported some intermittent incontinence of urine over the past month.  Diagnosed with urinary tract infection and started on Rocephin.  CT revealed extensive metastatic disease to the right humerus, ribs, liver, lymph nodes and thoracic and lumbar spines causing cord compression at T9.  Patient was transferred to [facility] for neurosurgery consultation. Placed in TLSO brace. CT chest here showing mediastinal LAD and L adrenal nodule as well as multiple bone metastasis with pathologic fracture. MRI of the thoracic and lumbar spine done which showed extensive metastatic disease involving the spine and also pathological fracture involving T9. Subjective  Patient seen and examined at bedside. Still has some back pain, chart reviewed, overnight events reviewed, denies any chest pain, palpitations. Scheduled for biopsy on Friday. Exam:    /73   Pulse 76   Temp 97.3 °F (36.3 °C) (Temporal)   Resp 16   Ht 5' (1.524 m)   Wt 172 lb (78 kg)   SpO2 99%   BMI 33.59 kg/m²     HEENT: No pallor, no icterus. Respiratory:  CTA, good air entry. Cardiovascular: RRR, no murmur. Abdomen: Soft, non-tender, BS noted. Musculoskeletal: No joint pains or joint swelling noted. Neurologic: awake, alert and following commands         Assessment/Plan:  Cord compression of the lumbar and thoracic spines secondary to metastatic disease: neurosurgery on the case. Pain control. Palliative care consulted for symptoms management.   Continue with Decadron, plan is for biopsy on Friday, continue with serial neuro checks.  MRI of the T and L-spine ordered. Metastatic breast cancer: Oncology following. Patient has imagines on a disc from [facility], disc is at home. Radiation oncology consulted. 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 Transition to oral cephalosporin on DC  Hypertension: on troprol XL  COPD: c/w breathing treatment   Hyponatremia: Continue to monitor, repeat labs pending, hold hydrochlorothiazide  Hypokalemia: Labs just came back, potassium 2.5, will replace potassium aggressively and repeat labs later       Labs:   No results for input(s): WBC, HGB, HCT, PLT in the last 72 hours. Recent Labs     04/12/22  0557   *   K 3.7   CL 87*   CO2 29   BUN 20   CREATININE 0.7   CALCIUM 9.9     Recent Labs     04/10/22  1441   AST 58*   ALT 42*   BILIDIR <0.2   BILITOT 0.3   ALKPHOS 230*     No results for input(s): INR in the last 72 hours. No results for input(s): Veronica Pal in the last 72 hours. Medications:  Reviewed    Infusion Medications    sodium chloride       Scheduled Medications    gabapentin  300 mg Oral TID    dexamethasone  4 mg Oral 2 times per day    pantoprazole  40 mg Oral QAM AC    hydroCHLOROthiazide  25 mg Oral Daily    metoprolol succinate  50 mg Oral BID    montelukast  10 mg Oral Nightly    pravastatin  20 mg Oral Dinner    sodium chloride flush  10 mL IntraVENous 2 times per day    cefTRIAXone (ROCEPHIN) IV  1,000 mg IntraVENous Q24H    fentaNYL  1 patch TransDERmal Q72H     PRN Meds: LORazepam, albuterol, zolpidem, sodium chloride flush, sodium chloride, promethazine **OR** ondansetron, polyethylene glycol, HYDROmorphone      Intake/Output Summary (Last 24 hours) at 4/13/2022 1011  Last data filed at 4/13/2022 0914  Gross per 24 hour   Intake 540 ml   Output 2425 ml   Net -1885 ml     · Body mass index is 33.59 kg/m². · Diet  · ADULT DIET;  Regular    · Code Status  · Limited       Electronically signed by Govind Barahona MD on 4/13/2022 at 10:11 AM  [employer]   Please contact me through perfect serve    NOTE: This report was transcribed using voice recognition software. Every effort was made to ensure accuracy; however, inadvertent computerized transcription errors may be present.

## 2022-04-13 NOTE — PROGRESS NOTES
Neurosurg progress note  VITALS:  /78   Pulse 76   Temp 97.3 °F (36.3 °C) (Temporal)   Resp 16   Ht 5' (1.524 m)   Wt 172 lb (78 kg)   SpO2 99%   BMI 33.59 kg/m²   24HR INTAKE/OUTPUT:    Intake/Output Summary (Last 24 hours) at 4/13/2022 1450  Last data filed at 4/13/2022 1313  Gross per 24 hour   Intake 600 ml   Output 425 ml   Net 175 ml     No results found.   CBC:   Lab Results   Component Value Date    WBC 12.1 04/09/2022    RBC 3.64 04/09/2022    HGB 10.4 04/09/2022    HCT 32.4 04/09/2022    MCV 89.0 04/09/2022    MCH 28.6 04/09/2022    MCHC 32.1 04/09/2022    RDW 14.5 04/09/2022     04/09/2022    MPV 9.7 04/09/2022     BMP:    Lab Results   Component Value Date     04/13/2022    K 2.5 04/13/2022    K 3.7 04/09/2022    CL 84 04/13/2022    CO2 25 04/13/2022    BUN 26 04/13/2022    LABALBU 4.0 04/10/2022    CREATININE 0.7 04/13/2022    CALCIUM 9.7 04/13/2022    GFRAA >60 04/13/2022    LABGLOM >60 04/13/2022    LABGLOM >60 05/30/2017    GLUCOSE 275 04/13/2022    GLUCOSE 178 05/30/2017      potassium chloride  40 mEq Oral Once    potassium chloride  10 mEq IntraVENous Q1H    gabapentin  300 mg Oral TID    dexamethasone  4 mg Oral 2 times per day    pantoprazole  40 mg Oral QAM AC    metoprolol succinate  50 mg Oral BID    montelukast  10 mg Oral Nightly    pravastatin  20 mg Oral Dinner    sodium chloride flush  10 mL IntraVENous 2 times per day    cefTRIAXone (ROCEPHIN) IV  1,000 mg IntraVENous Q24H    fentaNYL  1 patch TransDERmal Q72H     Remains awake and alert oriented friendly and cooperative speech is fluent good grasp of the medical issues understands he has diffuse skeletal mets motor is full  Assessment:  Patient Active Problem List   Diagnosis    S/P mastectomy, bilateral    Breast cancer metastasized to axillary lymph node ([address]    Cord compression (HCC)    Intractable back pain    UTI (urinary tract infection)    Debility     Plan: Plan is on Friday to under general anesthesia biopsy one of her vertebral body metastasis in the lumbar spine continue to hold Eliquis okay to radiate to mid thoracic spine continue current care  Chaya Beth MD M.D.

## 2022-04-13 NOTE — PROGRESS NOTES
Rad onc nurse saw patient that is currently an inpatient and she was here for stat Sim. Approximately >20 mins spent with patient about teducation for radiation to her thoracic spine, utilizing handouts and slides. All questions were answered from a nursing perspective and she expressed understanding of care. SHE signed her consent and her SIM was completed.

## 2022-04-13 NOTE — PLAN OF CARE
Problem: Skin Integrity:  Goal: Will show no infection signs and symptoms  Description: Will show no infection signs and symptoms  4/13/2022 0755 by Carlie Wilkerson RN  Outcome: Ongoing  4/12/2022 1829 by Digna Hernandez RN  Outcome: Met This Shift  Goal: Absence of new skin breakdown  Description: Absence of new skin breakdown  4/13/2022 0755 by Carlie Wilkerson RN  Outcome: Ongoing  4/12/2022 1829 by Digna Hernandez RN  Outcome: Met This Shift  Goal: Risk for impaired skin integrity will decrease  Description: Risk for impaired skin integrity will decrease  Outcome: Ongoing  Goal: Demonstration of wound healing without infection will improve  Description: Demonstration of wound healing without infection will improve  Outcome: Ongoing  Goal: Complications related to intravenous access or infusion will be avoided or minimized  Description: Complications related to intravenous access or infusion will be avoided or minimized  Outcome: Ongoing     Problem: Activity:  Goal: Ability to avoid complications of mobility impairment will improve  Description: Ability to avoid complications of mobility impairment will improve  Outcome: Ongoing  Goal: Ability to tolerate increased activity will improve  Description: Ability to tolerate increased activity will improve  Outcome: Ongoing     Problem:  Bowel/Gastric:  Goal: Gastrointestinal status for postoperative course will improve  Description: Gastrointestinal status for postoperative course will improve  Outcome: Ongoing     Problem: Fluid Volume:  Goal: Maintenance of adequate hydration will improve  Description: Maintenance of adequate hydration will improve  Outcome: Ongoing     Problem: Health Behavior:  Goal: Identification of resources available to assist in meeting health care needs will improve  Description: Identification of resources available to assist in meeting health care needs will improve  4/13/2022 0755 by Carlie Wilkerson RN  Outcome: Ongoing  4/12/2022 1829 by Violet Rice RN  Outcome: Met This Shift  Goal: Ability to state signs and symptoms to report to health care provider will improve  Description: Ability to state signs and symptoms to report to health care provider will improve  4/13/2022 0755 by Elissa James RN  Outcome: Ongoing  4/12/2022 1829 by Violet Rice RN  Outcome: Met This Shift     Problem: Nutritional:  Goal: Ability to attain and maintain optimal nutritional status will improve  Description: Ability to attain and maintain optimal nutritional status will improve  Outcome: Ongoing  Goal: Nutritional status will improve  Description: Nutritional status will improve  Outcome: Ongoing     Problem: Physical Regulation:  Goal: Ability to maintain clinical measurements within normal limits will improve  Description: Ability to maintain clinical measurements within normal limits will improve  Outcome: Ongoing  Goal: Will remain free from infection  Description: Will remain free from infection  Outcome: Ongoing  Goal: Diagnostic test results will improve  Description: Diagnostic test results will improve  Outcome: Ongoing  Goal: Ability to maintain vital signs within normal range will improve  Description: Ability to maintain vital signs within normal range will improve  Outcome: Ongoing     Problem: Respiratory:  Goal: Respiratory status will improve  Description: Respiratory status will improve  Outcome: Ongoing  Goal: Ability to maintain normal respiratory secretions will improve  Description: Ability to maintain normal respiratory secretions will improve  Outcome: Ongoing     Problem: Sensory:  Goal: Pain level will decrease  Description: Pain level will decrease  Outcome: Ongoing  Goal: Satisfaction with pain management regimen will improve  Description: Satisfaction with pain management regimen will improve  Outcome: Ongoing     Problem: Urinary Elimination:  Goal: Ability to achieve and maintain adequate urine output will improve  Description: Ability to achieve and maintain adequate urine output will improve  Outcome: Ongoing     Problem: Falls - Risk of:  Goal: Will remain free from falls  Description: Will remain free from falls  4/13/2022 0755 by Sherrill Simpson RN  Outcome: Ongoing  4/12/2022 1829 by Tiffanie Jacob RN  Outcome: Met This Shift  Goal: Absence of physical injury  Description: Absence of physical injury  4/13/2022 0755 by Sherrill Simpson RN  Outcome: Ongoing  4/12/2022 1829 by Tiffanie Jacob RN  Outcome: Met This Shift     Problem: Infection - Central Venous Catheter-Associated Bloodstream Infection:  Goal: Will show no infection signs and symptoms  Description: Will show no infection signs and symptoms  4/13/2022 0755 by Sherrill Simpson RN  Outcome: Ongoing  4/12/2022 1829 by Tiffanie Jacob RN  Outcome: Met This Shift     Problem: Pain:  Goal: Pain level will decrease  Description: Pain level will decrease  Outcome: Ongoing  Goal: Control of acute pain  Description: Control of acute pain  Outcome: Ongoing  Goal: Control of chronic pain  Description: Control of chronic pain  Outcome: Ongoing

## 2022-04-13 NOTE — CARE COORDINATION
4/13/22 Update CM Note: Patient remains on general medical floor. She continues to c/o poor pain control. Started on neurontin and fentynal patch. She is being followed by Palliative medicine for pain management. MRI's completed and reported. Plan is still for spinal biopsy Friday. Awaiting palliative radiation oncology for plan of care. Ca tumor markers pending. Will continue to follow. Patient continues to voice wanting to go home at discharge.  Electronically signed by Araseli Kwok RN CM on 4/13/2022 at 11:23 AM

## 2022-04-14 ENCOUNTER — HOSPITAL ENCOUNTER (OUTPATIENT)
Dept: RADIATION ONCOLOGY | Age: 67
Discharge: HOME | End: 2022-04-14
Attending: RADIOLOGY
Payer: COMMERCIAL

## 2022-04-14 ENCOUNTER — ANESTHESIA EVENT (OUTPATIENT)
Dept: OPERATING ROOM | Age: 67
DRG: 343 | End: 2022-04-14
Payer: COMMERCIAL

## 2022-04-14 LAB
ANION GAP SERPL CALCULATED.3IONS-SCNC: 14 MMOL/L (ref 7–16)
BUN BLDV-MCNC: 27 MG/DL (ref 6–23)
CALCIUM SERPL-MCNC: 9.2 MG/DL (ref 8.6–10.2)
CHLORIDE BLD-SCNC: 85 MMOL/L (ref 98–107)
CO2: 27 MMOL/L (ref 22–29)
CREAT SERPL-MCNC: 0.7 MG/DL (ref 0.5–1)
GFR AFRICAN AMERICAN: >60
GFR NON-AFRICAN AMERICAN: >60 ML/MIN/1.73
GLUCOSE BLD-MCNC: 230 MG/DL (ref 74–99)
POTASSIUM REFLEX MAGNESIUM: 3.7 MMOL/L (ref 3.5–5)
SODIUM BLD-SCNC: 126 MMOL/L (ref 132–146)

## 2022-04-14 PROCEDURE — 6370000000 HC RX 637 (ALT 250 FOR IP)

## 2022-04-14 PROCEDURE — 2140000000 HC CCU INTERMEDIATE R&B

## 2022-04-14 PROCEDURE — 77387 GUIDANCE FOR RADJ TX DLVR: CPT | Performed by: RADIOLOGY

## 2022-04-14 PROCEDURE — 99233 SBSQ HOSP IP/OBS HIGH 50: CPT | Performed by: NURSE PRACTITIONER

## 2022-04-14 PROCEDURE — 6360000002 HC RX W HCPCS: Performed by: INTERNAL MEDICINE

## 2022-04-14 PROCEDURE — 77014 PR CT GUIDANCE PLACEMENT RAD THERAPY FIELDS: CPT | Performed by: RADIOLOGY

## 2022-04-14 PROCEDURE — 6360000002 HC RX W HCPCS: Performed by: FAMILY MEDICINE

## 2022-04-14 PROCEDURE — 2580000003 HC RX 258: Performed by: INTERNAL MEDICINE

## 2022-04-14 PROCEDURE — 6370000000 HC RX 637 (ALT 250 FOR IP): Performed by: FAMILY MEDICINE

## 2022-04-14 PROCEDURE — 77412 RADIATION TX DELIVERY LVL 3: CPT | Performed by: RADIOLOGY

## 2022-04-14 PROCEDURE — 80048 BASIC METABOLIC PNL TOTAL CA: CPT

## 2022-04-14 PROCEDURE — 2580000003 HC RX 258: Performed by: FAMILY MEDICINE

## 2022-04-14 RX ORDER — OXYCODONE HYDROCHLORIDE 10 MG/1
10 TABLET ORAL EVERY 4 HOURS PRN
Status: DISCONTINUED | OUTPATIENT
Start: 2022-04-14 | End: 2022-04-15

## 2022-04-14 RX ORDER — SODIUM CHLORIDE 9 MG/ML
INJECTION, SOLUTION INTRAVENOUS CONTINUOUS
Status: DISCONTINUED | OUTPATIENT
Start: 2022-04-14 | End: 2022-04-15

## 2022-04-14 RX ORDER — OXYCODONE HYDROCHLORIDE 5 MG/1
5 TABLET ORAL EVERY 4 HOURS PRN
Status: DISCONTINUED | OUTPATIENT
Start: 2022-04-14 | End: 2022-04-16

## 2022-04-14 RX ADMIN — HYDROMORPHONE HYDROCHLORIDE 1 MG: 1 INJECTION, SOLUTION INTRAMUSCULAR; INTRAVENOUS; SUBCUTANEOUS at 09:11

## 2022-04-14 RX ADMIN — PANTOPRAZOLE SODIUM 40 MG: 40 TABLET, DELAYED RELEASE ORAL at 06:27

## 2022-04-14 RX ADMIN — Medication 10 ML: at 20:52

## 2022-04-14 RX ADMIN — METOPROLOL SUCCINATE 50 MG: 50 TABLET, EXTENDED RELEASE ORAL at 08:51

## 2022-04-14 RX ADMIN — DEXAMETHASONE 4 MG: 4 TABLET ORAL at 08:50

## 2022-04-14 RX ADMIN — SODIUM CHLORIDE: 9 INJECTION, SOLUTION INTRAVENOUS at 12:22

## 2022-04-14 RX ADMIN — PRAVASTATIN SODIUM 20 MG: 20 TABLET ORAL at 17:02

## 2022-04-14 RX ADMIN — GABAPENTIN 300 MG: 300 CAPSULE ORAL at 20:53

## 2022-04-14 RX ADMIN — MONTELUKAST SODIUM 10 MG: 10 TABLET ORAL at 20:52

## 2022-04-14 RX ADMIN — SODIUM CHLORIDE: 9 INJECTION, SOLUTION INTRAVENOUS at 22:38

## 2022-04-14 RX ADMIN — GABAPENTIN 300 MG: 300 CAPSULE ORAL at 15:09

## 2022-04-14 RX ADMIN — GABAPENTIN 300 MG: 300 CAPSULE ORAL at 08:50

## 2022-04-14 RX ADMIN — METOPROLOL SUCCINATE 50 MG: 50 TABLET, EXTENDED RELEASE ORAL at 20:53

## 2022-04-14 RX ADMIN — CEFTRIAXONE 1000 MG: 1 INJECTION, POWDER, FOR SOLUTION INTRAMUSCULAR; INTRAVENOUS at 21:01

## 2022-04-14 RX ADMIN — DEXAMETHASONE 4 MG: 4 TABLET ORAL at 20:53

## 2022-04-14 RX ADMIN — HYDROMORPHONE HYDROCHLORIDE 1 MG: 1 INJECTION, SOLUTION INTRAMUSCULAR; INTRAVENOUS; SUBCUTANEOUS at 15:10

## 2022-04-14 ASSESSMENT — PAIN SCALES - GENERAL
PAINLEVEL_OUTOF10: 0
PAINLEVEL_OUTOF10: 1
PAINLEVEL_OUTOF10: 0
PAINLEVEL_OUTOF10: 9
PAINLEVEL_OUTOF10: 0
PAINLEVEL_OUTOF10: 0
PAINLEVEL_OUTOF10: 7
PAINLEVEL_OUTOF10: 7

## 2022-04-14 ASSESSMENT — PAIN DESCRIPTION - FREQUENCY
FREQUENCY: INTERMITTENT

## 2022-04-14 ASSESSMENT — PAIN DESCRIPTION - DESCRIPTORS
DESCRIPTORS: NAGGING;SHARP;SORE
DESCRIPTORS: ACHING;DISCOMFORT;DULL
DESCRIPTORS: ACHING;DISCOMFORT;SORE

## 2022-04-14 ASSESSMENT — PAIN - FUNCTIONAL ASSESSMENT
PAIN_FUNCTIONAL_ASSESSMENT: ACTIVITIES ARE NOT PREVENTED
PAIN_FUNCTIONAL_ASSESSMENT: PREVENTS OR INTERFERES SOME ACTIVE ACTIVITIES AND ADLS
PAIN_FUNCTIONAL_ASSESSMENT: PREVENTS OR INTERFERES SOME ACTIVE ACTIVITIES AND ADLS

## 2022-04-14 ASSESSMENT — PAIN DESCRIPTION - PROGRESSION
CLINICAL_PROGRESSION: NOT CHANGED

## 2022-04-14 ASSESSMENT — PAIN DESCRIPTION - ORIENTATION
ORIENTATION: LOWER
ORIENTATION: MID;LOWER
ORIENTATION: LOWER;MID

## 2022-04-14 ASSESSMENT — PAIN DESCRIPTION - ONSET
ONSET: ON-GOING

## 2022-04-14 ASSESSMENT — PAIN DESCRIPTION - LOCATION
LOCATION: BACK

## 2022-04-14 ASSESSMENT — PAIN DESCRIPTION - PAIN TYPE
TYPE: ACUTE PAIN

## 2022-04-14 NOTE — PROGRESS NOTES
Hospitalist Progress Note      PCP: AKTHIE SOMERS CNP    Date of Admission: 4/8/2022  Days in the hospital: [address] Course:   Patient transferred from [facility] a history of metastatic breast cancer.  Also history of hypertension, COPD and DVT, chronically anticoagulated on Eliquis.  Patient has been having back pain since November with radiation down her legs.  She reported some intermittent incontinence of urine over the past month.  Diagnosed with urinary tract infection and started on Rocephin.  CT revealed extensive metastatic disease to the right humerus, ribs, liver, lymph nodes and thoracic and lumbar spines causing cord compression at T9.  Patient was transferred to [facility] for neurosurgery consultation. Placed in TLSO brace. CT chest here showing mediastinal LAD and L adrenal nodule as well as multiple bone metastasis with pathologic fracture. MRI of the thoracic and lumbar spine done which showed extensive metastatic disease involving the spine and also pathological fracture involving T9. Subjective  Patient seen and examined at bedside. Plan is for radiation therapy today and vertebral biopsy tomorrow. Pain is controlled. Chart reviewed overnight events reviewed. .    Exam:    /79   Pulse 70   Temp 97 °F (36.1 °C) (Temporal)   Resp 18   Ht 5' (1.524 m)   Wt 172 lb (78 kg)   SpO2 97%   BMI 33.59 kg/m²     HEENT: No pallor, no icterus. Respiratory:  CTA, good air entry. Cardiovascular: RRR, no murmur. Abdomen: Soft, non-tender, BS noted. Musculoskeletal: No joint pains or joint swelling noted. Neurologic: awake, alert and following commands         Assessment/Plan:  Cord compression of the lumbar and thoracic spines secondary to metastatic disease: neurosurgery on the case. Pain control.  Palliative care consulted for symptoms management.  Continue with Decadron, plan is for biopsy on Friday, continue with serial neuro checks.   MRI of the T and L-spine reviewed and plan is for vertebral biopsy tomorrow. .  Metastatic breast cancer: Oncology following. Patient has imagines on a disc from [facility], disc is at home.  Radiation oncology following. 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 Transition to oral cephalosporin on DC  Hypertension: on troprol XL  COPD: c/w breathing treatment   Hyponatremia: Hold hydrochlorothiazide, start on hydration with normal saline, check labs in a.m. Hypokalemia: Potassium corrected        Labs:   No results for input(s): WBC, HGB, HCT, PLT in the last 72 hours. Recent Labs     04/12/22  0557 04/13/22  1140 04/14/22  0100   * 127* 126*   K 3.7 2.5* 3.7   CL 87* 84* 85*   CO2 29 25 27   BUN 20 26* 27*   CREATININE 0.7 0.7 0.7   CALCIUM 9.9 9.7 9.2     No results for input(s): AST, ALT, BILIDIR, BILITOT, ALKPHOS in the last 72 hours. No results for input(s): INR in the last 72 hours. No results for input(s): Constance Beat in the last 72 hours.     Medications:  Reviewed    Infusion Medications    sodium chloride 100 mL/hr at 04/14/22 1222    sodium chloride       Scheduled Medications    [START ON 4/15/2022] ceFAZolin  2,000 mg IntraVENous See Admin Instructions    gabapentin  300 mg Oral TID    dexamethasone  4 mg Oral 2 times per day    pantoprazole  40 mg Oral QAM AC    metoprolol succinate  50 mg Oral BID    montelukast  10 mg Oral Nightly    pravastatin  20 mg Oral Dinner    sodium chloride flush  10 mL IntraVENous 2 times per day    cefTRIAXone (ROCEPHIN) IV  1,000 mg IntraVENous Q24H    fentaNYL  1 patch TransDERmal Q72H     PRN Meds: LORazepam, albuterol, zolpidem, sodium chloride flush, sodium chloride, promethazine **OR** ondansetron, polyethylene glycol, HYDROmorphone      Intake/Output Summary (Last 24 hours) at 4/14/2022 1257  Last data filed at 4/14/2022 0916  Gross per 24 hour Intake 1403.09 ml   Output 1850 ml   Net -446.91 ml     · Body mass index is 33.59 kg/m². · Diet  · ADULT DIET; Regular  · Diet NPO    · Code Status  · Limited       Electronically signed by Rayray Rasmussen MD on 4/14/2022 at 12:57 PM  [employer]   Please contact me through [employer]    NOTE: This report was transcribed using voice recognition software. Every effort was made to ensure accuracy; however, inadvertent computerized transcription errors may be present.

## 2022-04-14 NOTE — PROGRESS NOTES
Message sent to Palliative regarding bedside RN spoke to patient and she would like to speak to someone from their team regarding goals and code status tomorrow (4/15)

## 2022-04-14 NOTE — PROGRESS NOTES
Daron and Ricardo Monday  Dr. Ashly Alvarado      Patient Name: Blanca Godfrey  YOB: 1955  PCP: KATHIE Bautista CNP   Referring Provider: [address] [place] [address] / [facility], [place] [address]     Reason for Consultation: No chief complaint on file. Subjective:  First fr XRT today. Plans for biopsy tomorrow    History of Present Illness: This pt is a 60 yo female who follows with Dr. Arlette Bolaños in [place], for history of metastatic breast cancer. Also with hx of DVT on Eliquis. She has been having back pain since 11/21 radiating down her legs. She reported some intermittent incontinence of urine as well. CT revealed extensive metastatic disease to the right humerus, ribs, liver, lymph nodes and thoracic and lumbar spines causing cord compression at T9. Patient was transferred to [facility] for neurosurgery consultation. Seen by Dr. Sonia Jonas, no NSG intervention. Placed in TLSO brace. CT chest here showing mediastinal LAD and L adrenal nodule as well as multiple bone metastasis with pathologic fracture. On rocephin for UTI.      Diagnostic Data:     Past Medical History:   Diagnosis Date    Anxiety     pt anxious over surgery takes xanax prn    Arthritis     osteo    Asthma     Cancer ([address] 1999    right breast 2012 left breast    COPD (chronic obstructive pulmonary disease) (HCC)     stable per pt no sob    Depression     stable for diagnosis    Diabetes mellitus ([address]     stable per pt    FHx: chemotherapy 2000    Hx of blood clots     left leg, bilat lung PE, 2014    Hyperlipidemia     Hypertension     stable per pt    PONV (postoperative nausea and vomiting)     Radiation 2000    hx of    Reflux     Restless leg syndrome        Patient Active Problem List    Diagnosis Date Noted    UTI (urinary tract infection) 04/09/2022    Debility 04/09/2022    Cord compression ([address] 04/08/2022    Intractable back pain 04/08/2022    S/P mastectomy, bilateral 12/12/2012    Breast cancer metastasized to axillary lymph node ([address] 12/12/2012        Past Surgical History:   Procedure Laterality Date    ABDOMEN SURGERY      colon resection    BREAST RECONSTRUCTION Bilateral 10/23/2015     stage I    BREAST SURGERY  1999    partial right breast mastectomy     BREAST SURGERY  2012    left breast mastectomy    CARPAL TUNNEL RELEASE      right    CHOLECYSTECTOMY      FOOT SURGERY      HYSTERECTOMY  2001    JOINT REPLACEMENT  1999    right total hip    MOHS SURGERY  2011    TOENAIL EXCISION      bilat    TUNNELED VENOUS PORT PLACEMENT      right chest, 2012    VASCULAR SURGERY      stent placed left leg d/t blood clots        Family History  No family history on file. Social History    TOBACCO:   reports that she has quit smoking. She smoked 0.50 packs per day. She has never used smokeless tobacco.  ETOH:   reports no history of alcohol use. Home Medications  Prior to Admission medications    Medication Sig Start Date End Date Taking? Authorizing Provider   pantoprazole (PROTONIX) 40 MG tablet Take 40 mg by mouth daily   Yes Historical Provider, MD   metoprolol succinate (TOPROL XL) 50 MG extended release tablet Take 1 tablet by mouth 2 times daily 8/21/18   Svetlana Bush MD   hydrochlorothiazide (HYDRODIURIL) 25 MG tablet Take 25 mg by mouth daily    Historical Provider, MD   ondansetron (ZOFRAN) 4 MG tablet Take 1 tablet by mouth every 8 hours as needed for Nausea or Vomiting 10/23/15   John Urbano MD   montelukast (SINGULAIR) 10 MG tablet Take 10 mg by mouth nightly    Historical Provider, MD   vitamin D (ERGOCALCIFEROL) 61343 UNITS CAPS capsule Take 50,000 Units by mouth once a week Takes on Sundays    Historical Provider, MD   ibuprofen (IBU) 800 MG tablet Take 1 tablet by mouth every 8 hours for 14 days.  Must take with food 12/12/12 12/26/12  Meng Walls III, MD   pravastatin (PRAVACHOL) 20 MG tablet Take 20 mg by mouth Daily with supper. Historical Provider, MD   diphenoxylate-atropine (LOMOTIL) 2.5-0.025 MG per tablet Take 1 tablet by mouth as needed. Historical Provider, MD   zolpidem (AMBIEN) 10 MG tablet Take 10 mg by mouth nightly as needed. Historical Provider, MD   albuterol (PROVENTIL;VENTOLIN) 90 MCG/ACT inhaler Inhale 2 puffs into the lungs every 6 hours as needed. Bring with pt     Historical Provider, MD   fluticasone (FLOVENT HFA) 110 MCG/ACT inhaler Inhale 1 puff into the lungs as needed. Bring with pt to or     Historical Provider, MD   albuterol (PROVENTIL) (2.5 MG/3ML) 0.083% nebulizer solution Take 2.5 mg by nebulization every 6 hours as needed. Use in am of surgery if needed     Historical Provider, MD       Allergies  Allergies   Allergen Reactions    Codeine Hives and Nausea And Vomiting    Adhesive Tape      Rash with hives    Darvocet A500 [Propoxyphene N-Acetaminophen]      Rash and emisis       Review of Systems:    Stable but improved back pain. No additional complaints      Objective  BP (!) 150/75   Pulse 75   Temp 96 °F (35.6 °C) (Temporal)   Resp 18   Ht 5' (1.524 m)   Wt 172 lb (78 kg)   SpO2 94%   BMI 33.59 kg/m²     Physical Exam:   Performance Status:  General: AAO to person, place, time, in no acute distress  Head and neck : PERRLA, EOMI . Sclera non icteric. Oropharynx : Clear  Neck: no JVD,  no adenopathy  LYMPHATICS : No LAD  Heart: Regular rate and regular rhythm, no murmur  Lungs: Clear to auscultation   Extremities: No edema,no cyanosis, no clubbing.    Abdomen: Soft, non-tender;no masses, no organomegaly  Skin:  No rash  Neurologic:Cranial nerves grossly intact    Recent Laboratory Data-   Lab Results   Component Value Date    WBC 12.1 (H) 04/09/2022    HGB 10.4 (L) 04/09/2022    HCT 32.4 (L) 04/09/2022    MCV 89.0 04/09/2022     04/09/2022    LABLYMP 1.5 05/30/2017    LYMPHOPCT 11.4 (L) 04/09/2022    RBC 3.64 04/09/2022    MCH 28.6 04/09/2022    MCHC 32.1 04/09/2022    RDW 14.5 04/09/2022    NEUTOPHILPCT 83.0 (H) 04/09/2022    MONOPCT 5.0 04/09/2022    BASOPCT 0.1 04/09/2022    NEUTROABS 10.06 (H) 04/09/2022    LYMPHSABS 1.38 (L) 04/09/2022    MONOSABS 0.60 04/09/2022    EOSABS 0.00 (L) 04/09/2022    BASOSABS 0.01 04/09/2022       Lab Results   Component Value Date     (L) 04/14/2022    K 3.7 04/14/2022    CL 85 (L) 04/14/2022    CO2 27 04/14/2022    BUN 27 (H) 04/14/2022    CREATININE 0.7 04/14/2022    GLUCOSE 230 (H) 04/14/2022    CALCIUM 9.2 04/14/2022    PROT 8.0 04/10/2022    LABALBU 4.0 04/10/2022    BILITOT 0.3 04/10/2022    ALKPHOS 230 (H) 04/10/2022    AST 58 (H) 04/10/2022    ALT 42 (H) 04/10/2022    LABGLOM >60 04/14/2022    GFRAA >60 04/14/2022    AGRATIO 0.8 (L) 04/07/2022    GLOB 4.1 (H) 04/07/2022       No results found for: IRON, TIBC, FERRITIN        Radiology-    MRI THORACIC SPINE [address]   Final Result   Extensive metastatic disease throughout the thoracic spine with resulting   pathologic compression diffusely at T9 which also shows bulging posterior   cortical contour. There is resulting moderate bilateral T9-10 foraminal   stenosis. MRI LUMBAR SPINE W WO CONTRAST   Final Result   Diffuse metastatic disease of the lumbar spine without identified cortical   breakthrough. The sacrum and posterior iliac bones bilaterally are also   involved. Diffuse mild age-appropriate lumbar spine degenerative changes without   stenosis. CT CHEST W WO CONTRAST   Final Result   Enlarged mediastinal lymph nodes are suspicious for lymph node metastases. Prominent but subcentimeter short axis right axillary lymph node is   nonspecific. Left adrenal nodule may represent a metastasis. Multiple bone metastases with pathologic fractures. Recommend contrast   enhanced MRI of the spine for further evaluation.                ASSESSMENT/PLAN :  62 yo female  Stage IV breast cancer, follows with Dr. Marilu Dawn  T9 fracture with cord compression    - Seen by NSG, no plans for spinal surgery. They will review imaging discs from [place].  Biopsy per NSG to confirm receptor status  - Has TLSO brace  - Start decadron 4 mg q 12 hours  - Would get rad onc opinion as well regarding palliative XRT to the spine  - Check tumor markers  - On rocephin for UTI  - She will follow with Dr. Ritika Miramontes on [place] for discussion of next line therapy (unsure of prior lines) including bone agent    4/14/22  - Plan for vertebral biopsy tomorrow with NSG  - Radiation following, fiorst fraction palliative XRT to T8-T10 today with Dr. Wilfredo Singh  - Tumor markers were canceled and then re-ordered  - She will follow with Dr. Ritika Miramontes at [facility] [place]      Electronically signed by Karolyn Fair MD on 4/14/2022 at 4:06 PM

## 2022-04-14 NOTE — ANESTHESIA PRE PROCEDURE
Department of Anesthesiology  Preprocedure Note       Name:  Kiera Ulloa   Age:  61 y.o.  :  1955                                          MRN:  [id number]         Date:  2022      Surgeon: Belem Alvarenga):  Chandrika Hinds MD    Procedure: Procedure(s):  BIOPSY VERTEBRAL BODY T-9, POSSIBLE KYPHOPLASTY, PRONE    Medications prior to admission:   Prior to Admission medications    Medication Sig Start Date End Date Taking? Authorizing Provider   pantoprazole (PROTONIX) 40 MG tablet Take 40 mg by mouth daily   Yes Historical Provider, MD   metoprolol succinate (TOPROL XL) 50 MG extended release tablet Take 1 tablet by mouth 2 times daily 18   John Valdez MD   hydrochlorothiazide (HYDRODIURIL) 25 MG tablet Take 25 mg by mouth daily    Historical Provider, MD   ondansetron (ZOFRAN) 4 MG tablet Take 1 tablet by mouth every 8 hours as needed for Nausea or Vomiting 10/23/15   Mary Alice Dupree MD   montelukast (SINGULAIR) 10 MG tablet Take 10 mg by mouth nightly    Historical Provider, MD   vitamin D (ERGOCALCIFEROL) 81253 UNITS CAPS capsule Take 50,000 Units by mouth once a week Takes on Sundays    Historical Provider, MD   ibuprofen (IBU) 800 MG tablet Take 1 tablet by mouth every 8 hours for 14 days. Must take with food 12  Denzel Milligan III, MD   pravastatin (PRAVACHOL) 20 MG tablet Take 20 mg by mouth Daily with supper. Historical Provider, MD   diphenoxylate-atropine (LOMOTIL) 2.5-0.025 MG per tablet Take 1 tablet by mouth as needed. Historical Provider, MD   zolpidem (AMBIEN) 10 MG tablet Take 10 mg by mouth nightly as needed. Historical Provider, MD   albuterol (PROVENTIL;VENTOLIN) 90 MCG/ACT inhaler Inhale 2 puffs into the lungs every 6 hours as needed. Bring with pt     Historical Provider, MD   fluticasone (FLOVENT HFA) 110 MCG/ACT inhaler Inhale 1 puff into the lungs as needed.  Bring with pt to or     Historical Provider, MD   albuterol (PROVENTIL) (2.5 MG/3ML) 0.083% nebulizer solution Take 2.5 mg by nebulization every 6 hours as needed.  Use in am of surgery if needed     Historical Provider, MD       Current medications:    Current Facility-Administered Medications   Medication Dose Route Frequency Provider Last Rate Last Admin    [START ON 4/15/2022] ceFAZolin (ANCEF) 2000 mg in sterile water 20 mL IV syringe  2,000 mg IntraVENous See Admin Instructions Sue Lujan MD        0.9 % sodium chloride infusion   IntraVENous Continuous Juan Manuel Bowser  mL/hr at 04/14/22 1222 New Bag at 04/14/22 1222    gabapentin (NEURONTIN) capsule 300 mg  300 mg Oral TID KATHEI Cortez - CNP   300 mg at 04/14/22 0850    dexamethasone (DECADRON) tablet 4 mg  4 mg Oral 2 times per day Mily Patrick MD   4 mg at 04/14/22 0850    LORazepam (ATIVAN) tablet 0.5 mg  0.5 mg Oral Q4H PRN Destiny Mera MD   0.5 mg at 04/12/22 2055    pantoprazole (PROTONIX) tablet 40 mg  40 mg Oral QAM AC Calvin Oka, DO   40 mg at 04/14/22 9931    albuterol (PROVENTIL) nebulizer solution 2.5 mg  2.5 mg Nebulization Q6H PRN Calvin Oka, DO        metoprolol succinate (TOPROL XL) extended release tablet 50 mg  50 mg Oral BID Calvin Oka, DO   50 mg at 04/14/22 0851    montelukast (SINGULAIR) tablet 10 mg  10 mg Oral Nightly Calvin Oka, DO   10 mg at 04/13/22 2055    zolpidem (AMBIEN) tablet 10 mg  10 mg Oral Nightly PRN Calvin Oka, DO   10 mg at 04/13/22 2110    pravastatin (PRAVACHOL) tablet 20 mg  20 mg Oral Dinner Calvin Oka, DO   20 mg at 04/12/22 1749    sodium chloride flush 0.9 % injection 10 mL  10 mL IntraVENous 2 times per day Calvin Oka, DO   10 mL at 04/13/22 2055    sodium chloride flush 0.9 % injection 10 mL  10 mL IntraVENous PRN Calvin Oka, DO   10 mL at 04/12/22 1551    0.9 % sodium chloride infusion   IntraVENous PRN Calvin Boo DO        promethazine (PHENERGAN) tablet 12.5 mg  12.5 mg Oral Q6H PRN Cheyenne Loyola Bernardine Palomo,         Or    ondansetron [facility]) injection 4 mg  4 mg IntraVENous Q6H PRN Luane Bugler, DO   4 mg at 04/13/22 1321    polyethylene glycol (GLYCOLAX) packet 17 g  17 g Oral Daily PRN Luane Bugler, DO        cefTRIAXone (ROCEPHIN) 1,000 mg in sterile water 10 mL IV syringe  1,000 mg IntraVENous Q24H Luane Bugler, DO   1,000 mg at 04/13/22 2055    HYDROmorphone (DILAUDID) injection 1 mg  1 mg IntraVENous Q4H PRN Luane Bugler, DO   1 mg at 04/14/22 0911    fentaNYL (DURAGESIC) 12 MCG/HR 1 patch  1 patch TransDERmal Q72H Luane Bugler, DO   1 patch at 04/11/22 2310       Allergies:     Allergies   Allergen Reactions    Codeine Hives and Nausea And Vomiting    Adhesive Tape      Rash with hives    Darvocet A500 [Propoxyphene N-Acetaminophen]      Rash and emisis       Problem List:    Patient Active Problem List   Diagnosis Code    S/P mastectomy, bilateral Z90.13    Breast cancer metastasized to axillary lymph node (HCC) C50.919, C77.3    Cord compression (HCC) G95.20    Intractable back pain M54.9    UTI (urinary tract infection) N39.0    Debility R53.81       Past Medical History:        Diagnosis Date    Anxiety     pt anxious over surgery takes xanax prn    Arthritis     osteo    Asthma     Cancer [address] 1999    right breast 2012 left breast    COPD (chronic obstructive pulmonary disease) (HCC)     stable per pt no sob    Depression     stable for diagnosis    Diabetes mellitus ([facility]     stable per pt    FHx: chemotherapy 2000    Hx of blood clots     left leg, bilat lung PE, 2014    Hyperlipidemia     Hypertension     stable per pt    PONV (postoperative nausea and vomiting)     Radiation 2000    hx of    Reflux     Restless leg syndrome        Past Surgical History:        Procedure Laterality Date    ABDOMEN SURGERY      colon resection    BREAST RECONSTRUCTION Bilateral 10/23/2015     stage I    BREAST SURGERY  1999    partial right breast mastectomy     BREAST SURGERY  2012    left breast mastectomy    CARPAL TUNNEL RELEASE      right    CHOLECYSTECTOMY      FOOT SURGERY      HYSTERECTOMY  2001    JOINT REPLACEMENT  1999    right total hip    MOHS SURGERY  2011    TOENAIL EXCISION      bilat    TUNNELED VENOUS PORT PLACEMENT      right chest, 2012    VASCULAR SURGERY      stent placed left leg d/t blood clots        Social History:    Social History     Tobacco Use    Smoking status: Former Smoker     Packs/day: 0.50    Smokeless tobacco: Never Used   Substance Use Topics    Alcohol use: No                                Counseling given: Not Answered      Vital Signs (Current):   Vitals:    04/13/22 1730 04/13/22 2027 04/13/22 2323 04/14/22 0753   BP: 133/67 122/79 124/73 135/79   Pulse: 79 86 81 70   Resp: 18 16 18 18   Temp:  35.8 °C (96.4 °F) 36.4 °C (97.5 °F) 36.1 °C (97 °F)   TempSrc: Temporal Temporal Temporal Temporal   SpO2: 99% 96% 98% 97%   Weight:       Height:                                                  BP Readings from Last 3 Encounters:   04/14/22 135/79   08/21/18 130/84       NPO Status:                                                                                 BMI:   Wt Readings from Last 3 Encounters:   04/10/22 172 lb (78 kg)   08/21/18 189 lb (85.7 kg)   08/17/18 187 lb (84.8 kg)     Body mass index is 33.59 kg/m².     CBC:   Lab Results   Component Value Date    WBC 12.1 04/09/2022    RBC 3.64 04/09/2022    HGB 10.4 04/09/2022    HCT 32.4 04/09/2022    MCV 89.0 04/09/2022    RDW 14.5 04/09/2022     04/09/2022       CMP:   Lab Results   Component Value Date     04/14/2022    K 3.7 04/14/2022    CL 85 04/14/2022    CO2 27 04/14/2022    BUN 27 04/14/2022    CREATININE 0.7 04/14/2022    GFRAA >60 04/14/2022    AGRATIO 0.8 04/07/2022    LABGLOM >60 04/14/2022    LABGLOM >60 05/30/2017    GLUCOSE 230 04/14/2022    GLUCOSE 178 05/30/2017    PROT 8.0 04/10/2022    CALCIUM 9.2 04/14/2022    BILITOT 0.3 04/10/2022    ALKPHOS 230 04/10/2022    AST 58 04/10/2022    ALT 42 04/10/2022       POC Tests: No results for input(s): POCGLU, POCNA, POCK, POCCL, POCBUN, POCHEMO, POCHCT in the last 72 hours. Coags:   Lab Results   Component Value Date    PROTIME 10.0 2017    INR 0.9 2017    APTT 93.1 2022       HCG (If Applicable): No results found for: PREGTESTUR, PREGSERUM, HCG, HCGQUANT     ABGs: No results found for: PHART, PO2ART, LXE8RGA, CSK5WIO, BEART, Z2RVUXZI     Type & Screen (If Applicable):  No results found for: LABABO, LABRH    Drug/Infectious Status (If Applicable):  No results found for: HIV, HEPCAB    COVID-19 Screening (If Applicable): No results found for: COVID19    EK18  Sinus  Bradycardia   WITHIN NORMAL LIMITS    ECHO: 18    <Conclusion>        LVEF is 55-60%.      There is normal LV segmental wall motion.        Left ventricular filling pattern is normal for age.   Trey Comment   The right ventricular systolic function is normal    Anesthesia Evaluation  Patient summary reviewed   history of anesthetic complications: PONV. Airway: Mallampati: I  TM distance: >3 FB   Neck ROM: full  Mouth opening: > = 3 FB Dental:    (+) upper dentures      Pulmonary: breath sounds clear to auscultation  (+) COPD:  asthma:                            Cardiovascular:    (+) hypertension:,       ECG reviewed  Rhythm: regular  Rate: normal           Beta Blocker:  Dose within 24 Hrs         Neuro/Psych:   (+) neuromuscular disease (restless leg syndrome):, depression/anxiety              ROS comment: Increasing pain in lower legs GI/Hepatic/Renal:   (+) GERD:,           Endo/Other:    (+) DiabetesType II DM, well controlled, , malignancy/cancer (metastatic breast cx).                   ROS comment: CT revealed extensive metastatic disease to the right humerus, ribs, liver, lymph nodes and thoracic and lumbar spines causing cord compression at T9  CT chest here showing mediastinal LAD and L adrenal nodule as well as multiple bone metastasis with pathologic fracture. MRI of the thoracic and lumbar spine done which showed extensive metastatic disease involving the spine and also pathological fracture involving T9. Abdominal:             Vascular:           ROS comment: Hx blood clots 2014  Denies numbness or tingling in extremities . Other Findings:             Anesthesia Plan      general     ASA 3           MIPS: Postoperative opioids intended and Prophylactic antiemetics administered. Anesthetic plan and risks discussed with patient. Use of blood products discussed with patient whom consented to blood products. Plan discussed with CRNA and attending.                   Hafsa Fischer RN   4/14/2022

## 2022-04-14 NOTE — PROGRESS NOTES
Informed consent obtained from the patient regarding her vertebral body biopsy in the lumbar region to obtain likely metastatic breast cancer for receptor analysis    Risks including bleeding worsening back pain blood clots heart attack strokes and other unforeseen were carefully discussed she prefers to have this done under general anesthesia I did give her the option of having a CT-guided biopsy in the radiology suite but she deferred    Orders were placed she will be n.p.o. after midnight and tomorrow morning undergo fluoroscopy guided percutaneous transpedicular vertebral body biopsy of one of the lumbar vertebra

## 2022-04-14 NOTE — PLAN OF CARE
Problem: Skin Integrity:  Goal: Will show no infection signs and symptoms  Description: Will show no infection signs and symptoms  4/14/2022 0939 by Navarro Strong RN  Outcome: Met This Shift     Problem: Sensory:  Goal: Pain level will decrease  Description: Pain level will decrease  4/14/2022 0939 by Navarro Strong RN  Outcome: Ongoing     Problem: Infection - Central Venous Catheter-Associated Bloodstream Infection:  Goal: Will show no infection signs and symptoms  Description: Will show no infection signs and symptoms  4/14/2022 0939 by Navarro Strong RN  Outcome: Met This Shift     Problem: Pain:  Goal: Pain level will decrease  Description: Pain level will decrease  4/14/2022 0939 by Navarro Strong RN  Outcome: Ongoing

## 2022-04-14 NOTE — PATIENT CARE CONFERENCE
P Quality Flow/Interdisciplinary Rounds Progress Note        Quality Flow Rounds held on April 14, 2022    Disciplines Attending:  Bedside Nurse, ,  and Nursing Unit [address] Lopez was admitted on 4/8/2022  7:12 PM    Anticipated Discharge Date:       Disposition:    Umberto Score:  Umberto Scale Score: 19    Readmission Risk              Risk of Unplanned Readmission:  12           Discussed patient goal for the day, patient clinical progression, and barriers to discharge.   The following Goal(s) of the Day/Commitment(s) have been identified:  Diagnostics - Report Results and Labs - Report Results      Miguel Angel Luna RN  April 14, 2022

## 2022-04-14 NOTE — PROGRESS NOTES
Palliative Care Department  [phone]  Palliative Care Progress Note  Provider Santa Hernandez, APRN - CNP      PATIENT: Unique Garcia  : 1955  MRN: [id number]  ADMISSION DATE: 2022  7:12 PM  Referring Provider: Nayla Good MD    Palliative Medicine was consulted on hospital day 6 for assistance with Goals of care, Symptom management     HPI:     Madelyn Alvarado is a 61 y.o. y/o female with a history of static breast cancer, anxiety, COPD, depression, diabetes mellitus, hypertension, restless leg syndrome who presented to [facility]) on 2022 as a transfer from [facility] for a neurosurgery consultation due to cord compression at T9 from extensive metastases disease to  thoracic and lumbar spine. After have been evaluated by neurosurgery, patient is not interested in surgical intervention, and off-the-shelf TLSO brace was recommended. 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 every 4 hours as needed   Pain due to neoplasm  o Fentanyl 12 mcg patch every 72 hours  o Hydromorphone 1 mg every 4 hours as needed   o gabapentin 300 mg 3 times daily  o Start oxycodone 5 -10 mg every 4 hours as needed    Palliative Care Encounter / Counseling Regarding Goals of Care  Please see detailed goals of care discussion as below   At this time, Unique Garcia, Does have capacity for medical decision-making. Capacity is time limited and situation/question specific   Outcome of goals of care meeting:  o Continue with current medical treatment  o Wishes for CODE STATUS to be changed to limited DNR CCA DNI  o Does not want aggressive surgical intervention, but is hoping for conservative treatment.     Code status Limited DNR CCA DNI   Advanced Directives: no POA or living will in [facility] NOK:    Spiritual assessment: no spiritual distress identified  Bereavement and grief: to be determined  Referrals to: none today    Thank you for the opportunity to participate in the care of Vale Cruz. KATHIE Shi - NIKOLAS  Palliative Medicine     SUBJECTIVE:     Details of Conversation:    Chart reviewed and met with Fabian Connelly at bedside. She explained that she had a very stressful morning that made her very upset. This caused her to have worsening anxiety and pain. She states that she tolerated radiation treatment fairly well. She states that pain has been relatively well controlled. Plan is for a biopsy tomorrow. We will add oral pain medication for transition home. She does have occasional nausea, but states that Zofran has been effective. She denies any other needs at this time. Prognosis: Guarded    OBJECTIVE:     BP (!) 150/75   Pulse 75   Temp 96 °F (35.6 °C) (Temporal)   Resp 18   Ht 5' (1.524 m)   Wt 172 lb (78 kg)   SpO2 94%   BMI 33.59 kg/m²     Physical Examination:  Gen: elderly, thin, NAD, awake, alert   HEENT: normocephalic, atraumatic, PERRL, EOMI,   Neck: trachea midline, no JVD  Lungs: respirations easy and not labored,   Heart: regular rate and rhythm, distant heart tones,   Abdomen: normoactive bowel sounds, soft, non-tender  Extremities: no clubbing, cyanosis or edema, moving all extremities    Skin: warm, dry without rashes, lesions, bruising  Neuro: awake, alert, oriented x 3, follows commands, no gross neurologic deficit    Objective data reviewed: labs, images, records, medication use, vitals and chart    Time/Communication  Greater than 50% of time spent, total 35 minutes in counseling and coordination of care at the bedside regarding goals of care and symptom management. Thank you for allowing Palliative Medicine to participate in the care of Vael Cruz. Note: This report was completed using [employer] voiced recognition software.   Every effort has been made to ensure accuracy; however, inadvertent computerized transcription errors may be present.

## 2022-04-14 NOTE — CARE COORDINATION
SOCIAL WORK/CASEMANAGEMENT TRANSITION OF CARE [id number] [facility] [address], [place], [phone]): met with pt in the room this a.m. I called [facility] and they do not have a pallative care program. They recommended [facility] at [phone] fax [phone] they are based out of [place], [place] and has home visiting program only. I faxed the chart to them to assess. Pt plan is home with her son who is not able to drive due to seizures but she has 3 other children that help out. Pt said she is to start chemo and radiation treatments again. [facility] setup fww to room. REID Nguyen  4/14/2022  [facility] and they said it will take about 3 days for them to review the paperwork and they will reach out to me.  REID Nguyen  4/14/2022

## 2022-04-15 ENCOUNTER — HOSPITAL ENCOUNTER (OUTPATIENT)
Dept: RADIATION ONCOLOGY | Age: 67
Discharge: HOME | End: 2022-04-15
Attending: RADIOLOGY
Payer: COMMERCIAL

## 2022-04-15 ENCOUNTER — ANESTHESIA (OUTPATIENT)
Dept: OPERATING ROOM | Age: 67
DRG: 343 | End: 2022-04-15
Payer: COMMERCIAL

## 2022-04-15 ENCOUNTER — APPOINTMENT (OUTPATIENT)
Dept: GENERAL RADIOLOGY | Age: 67
DRG: 343 | End: 2022-04-15
Attending: STUDENT IN AN ORGANIZED HEALTH CARE EDUCATION/TRAINING PROGRAM
Payer: COMMERCIAL

## 2022-04-15 VITALS — TEMPERATURE: 96.3 F | OXYGEN SATURATION: 94 % | SYSTOLIC BLOOD PRESSURE: 125 MMHG | DIASTOLIC BLOOD PRESSURE: 68 MMHG

## 2022-04-15 LAB
ANION GAP SERPL CALCULATED.3IONS-SCNC: 10 MMOL/L (ref 7–16)
BUN BLDV-MCNC: 19 MG/DL (ref 6–23)
CALCIUM SERPL-MCNC: 9 MG/DL (ref 8.6–10.2)
CHLORIDE BLD-SCNC: 99 MMOL/L (ref 98–107)
CO2: 28 MMOL/L (ref 22–29)
CREAT SERPL-MCNC: 0.5 MG/DL (ref 0.5–1)
GFR AFRICAN AMERICAN: >60
GFR NON-AFRICAN AMERICAN: >60 ML/MIN/1.73
GLUCOSE BLD-MCNC: 134 MG/DL (ref 74–99)
HCT VFR BLD CALC: 33.4 % (ref 34–48)
HEMOGLOBIN: 10.8 G/DL (ref 11.5–15.5)
MCH RBC QN AUTO: 28.5 PG (ref 26–35)
MCHC RBC AUTO-ENTMCNC: 32.3 % (ref 32–34.5)
MCV RBC AUTO: 88.1 FL (ref 80–99.9)
PDW BLD-RTO: 14.5 FL (ref 11.5–15)
PLATELET # BLD: 301 E9/L (ref 130–450)
PMV BLD AUTO: 9.2 FL (ref 7–12)
POTASSIUM SERPL-SCNC: 3.6 MMOL/L (ref 3.5–5)
RBC # BLD: 3.79 E12/L (ref 3.5–5.5)
SODIUM BLD-SCNC: 137 MMOL/L (ref 132–146)
WBC # BLD: 8.4 E9/L (ref 4.5–11.5)

## 2022-04-15 PROCEDURE — 3700000000 HC ANESTHESIA ATTENDED CARE: Performed by: NEUROLOGICAL SURGERY

## 2022-04-15 PROCEDURE — 6360000002 HC RX W HCPCS: Performed by: NEUROLOGICAL SURGERY

## 2022-04-15 PROCEDURE — 88311 DECALCIFY TISSUE: CPT

## 2022-04-15 PROCEDURE — 2580000003 HC RX 258

## 2022-04-15 PROCEDURE — C1894 INTRO/SHEATH, NON-LASER: HCPCS | Performed by: NEUROLOGICAL SURGERY

## 2022-04-15 PROCEDURE — 88342 IMHCHEM/IMCYTCHM 1ST ANTB: CPT

## 2022-04-15 PROCEDURE — 77412 RADIATION TX DELIVERY LVL 3: CPT | Performed by: RADIOLOGY

## 2022-04-15 PROCEDURE — 0QB03ZX EXCISION OF LUMBAR VERTEBRA, PERCUTANEOUS APPROACH, DIAGNOSTIC: ICD-10-PCS | Performed by: NEUROLOGICAL SURGERY

## 2022-04-15 PROCEDURE — 80048 BASIC METABOLIC PNL TOTAL CA: CPT

## 2022-04-15 PROCEDURE — 85027 COMPLETE CBC AUTOMATED: CPT

## 2022-04-15 PROCEDURE — 99232 SBSQ HOSP IP/OBS MODERATE 35: CPT | Performed by: NURSE PRACTITIONER

## 2022-04-15 PROCEDURE — 6370000000 HC RX 637 (ALT 250 FOR IP): Performed by: NURSE PRACTITIONER

## 2022-04-15 PROCEDURE — 7100000001 HC PACU RECOVERY - ADDTL 15 MIN: Performed by: NEUROLOGICAL SURGERY

## 2022-04-15 PROCEDURE — 6370000000 HC RX 637 (ALT 250 FOR IP): Performed by: NEUROLOGICAL SURGERY

## 2022-04-15 PROCEDURE — 88307 TISSUE EXAM BY PATHOLOGIST: CPT

## 2022-04-15 PROCEDURE — 2140000000 HC CCU INTERMEDIATE R&B

## 2022-04-15 PROCEDURE — 6370000000 HC RX 637 (ALT 250 FOR IP): Performed by: INTERNAL MEDICINE

## 2022-04-15 PROCEDURE — 3600000004 HC SURGERY LEVEL 4 BASE: Performed by: NEUROLOGICAL SURGERY

## 2022-04-15 PROCEDURE — 6370000000 HC RX 637 (ALT 250 FOR IP): Performed by: FAMILY MEDICINE

## 2022-04-15 PROCEDURE — 2709999900 HC NON-CHARGEABLE SUPPLY: Performed by: NEUROLOGICAL SURGERY

## 2022-04-15 PROCEDURE — 3209999900 FLUORO FOR SURGICAL PROCEDURES

## 2022-04-15 PROCEDURE — 6360000002 HC RX W HCPCS: Performed by: INTERNAL MEDICINE

## 2022-04-15 PROCEDURE — 3700000001 HC ADD 15 MINUTES (ANESTHESIA): Performed by: NEUROLOGICAL SURGERY

## 2022-04-15 PROCEDURE — 6360000002 HC RX W HCPCS

## 2022-04-15 PROCEDURE — 2580000003 HC RX 258: Performed by: NEUROLOGICAL SURGERY

## 2022-04-15 PROCEDURE — 3600000014 HC SURGERY LEVEL 4 ADDTL 15MIN: Performed by: NEUROLOGICAL SURGERY

## 2022-04-15 PROCEDURE — 2500000003 HC RX 250 WO HCPCS

## 2022-04-15 PROCEDURE — 77387 GUIDANCE FOR RADJ TX DLVR: CPT | Performed by: RADIOLOGY

## 2022-04-15 PROCEDURE — 2720000010 HC SURG SUPPLY STERILE: Performed by: NEUROLOGICAL SURGERY

## 2022-04-15 PROCEDURE — 2580000003 HC RX 258: Performed by: FAMILY MEDICINE

## 2022-04-15 PROCEDURE — 36415 COLL VENOUS BLD VENIPUNCTURE: CPT

## 2022-04-15 PROCEDURE — 7100000000 HC PACU RECOVERY - FIRST 15 MIN: Performed by: NEUROLOGICAL SURGERY

## 2022-04-15 PROCEDURE — 77014 PR CT GUIDANCE PLACEMENT RAD THERAPY FIELDS: CPT | Performed by: RADIOLOGY

## 2022-04-15 RX ORDER — ROCURONIUM BROMIDE 10 MG/ML
INJECTION, SOLUTION INTRAVENOUS PRN
Status: DISCONTINUED | OUTPATIENT
Start: 2022-04-15 | End: 2022-04-15 | Stop reason: SDUPTHER

## 2022-04-15 RX ORDER — MIDAZOLAM HYDROCHLORIDE 1 MG/ML
INJECTION INTRAMUSCULAR; INTRAVENOUS PRN
Status: DISCONTINUED | OUTPATIENT
Start: 2022-04-15 | End: 2022-04-15 | Stop reason: SDUPTHER

## 2022-04-15 RX ORDER — DEXAMETHASONE SODIUM PHOSPHATE 10 MG/ML
INJECTION INTRAMUSCULAR; INTRAVENOUS PRN
Status: DISCONTINUED | OUTPATIENT
Start: 2022-04-15 | End: 2022-04-15 | Stop reason: SDUPTHER

## 2022-04-15 RX ORDER — IPRATROPIUM BROMIDE AND ALBUTEROL SULFATE 2.5; .5 MG/3ML; MG/3ML
1 SOLUTION RESPIRATORY (INHALATION)
Status: DISCONTINUED | OUTPATIENT
Start: 2022-04-15 | End: 2022-04-15 | Stop reason: HOSPADM

## 2022-04-15 RX ORDER — DIPHENHYDRAMINE HYDROCHLORIDE 50 MG/ML
INJECTION INTRAMUSCULAR; INTRAVENOUS PRN
Status: DISCONTINUED | OUTPATIENT
Start: 2022-04-15 | End: 2022-04-15 | Stop reason: SDUPTHER

## 2022-04-15 RX ORDER — POLYETHYLENE GLYCOL 3350 17 G/17G
17 POWDER, FOR SOLUTION ORAL DAILY
Status: DISCONTINUED | OUTPATIENT
Start: 2022-04-16 | End: 2022-04-27 | Stop reason: HOSPADM

## 2022-04-15 RX ORDER — SODIUM CHLORIDE 0.9 % (FLUSH) 0.9 %
5-40 SYRINGE (ML) INJECTION EVERY 12 HOURS SCHEDULED
Status: DISCONTINUED | OUTPATIENT
Start: 2022-04-15 | End: 2022-04-15 | Stop reason: HOSPADM

## 2022-04-15 RX ORDER — SODIUM CHLORIDE 9 MG/ML
25 INJECTION, SOLUTION INTRAVENOUS PRN
Status: DISCONTINUED | OUTPATIENT
Start: 2022-04-15 | End: 2022-04-15 | Stop reason: HOSPADM

## 2022-04-15 RX ORDER — ONDANSETRON 2 MG/ML
INJECTION INTRAMUSCULAR; INTRAVENOUS PRN
Status: DISCONTINUED | OUTPATIENT
Start: 2022-04-15 | End: 2022-04-15 | Stop reason: SDUPTHER

## 2022-04-15 RX ORDER — DIPHENHYDRAMINE HYDROCHLORIDE 50 MG/ML
12.5 INJECTION INTRAMUSCULAR; INTRAVENOUS
Status: DISCONTINUED | OUTPATIENT
Start: 2022-04-15 | End: 2022-04-15 | Stop reason: HOSPADM

## 2022-04-15 RX ORDER — FENTANYL CITRATE 50 UG/ML
INJECTION, SOLUTION INTRAMUSCULAR; INTRAVENOUS PRN
Status: DISCONTINUED | OUTPATIENT
Start: 2022-04-15 | End: 2022-04-15 | Stop reason: SDUPTHER

## 2022-04-15 RX ORDER — DIPHENHYDRAMINE HCL 25 MG
12.5 TABLET ORAL EVERY 6 HOURS PRN
Status: DISCONTINUED | OUTPATIENT
Start: 2022-04-15 | End: 2022-04-27 | Stop reason: HOSPADM

## 2022-04-15 RX ORDER — LABETALOL HYDROCHLORIDE 5 MG/ML
5 INJECTION, SOLUTION INTRAVENOUS
Status: DISCONTINUED | OUTPATIENT
Start: 2022-04-15 | End: 2022-04-15 | Stop reason: HOSPADM

## 2022-04-15 RX ORDER — CEFAZOLIN SODIUM 1 G/3ML
INJECTION, POWDER, FOR SOLUTION INTRAMUSCULAR; INTRAVENOUS PRN
Status: DISCONTINUED | OUTPATIENT
Start: 2022-04-15 | End: 2022-04-15 | Stop reason: SDUPTHER

## 2022-04-15 RX ORDER — SODIUM CHLORIDE 0.9 % (FLUSH) 0.9 %
5-40 SYRINGE (ML) INJECTION PRN
Status: DISCONTINUED | OUTPATIENT
Start: 2022-04-15 | End: 2022-04-15 | Stop reason: HOSPADM

## 2022-04-15 RX ORDER — DROPERIDOL 2.5 MG/ML
0.62 INJECTION, SOLUTION INTRAMUSCULAR; INTRAVENOUS
Status: DISCONTINUED | OUTPATIENT
Start: 2022-04-15 | End: 2022-04-15 | Stop reason: HOSPADM

## 2022-04-15 RX ORDER — MEPERIDINE HYDROCHLORIDE 25 MG/ML
12.5 INJECTION INTRAMUSCULAR; INTRAVENOUS; SUBCUTANEOUS EVERY 5 MIN PRN
Status: DISCONTINUED | OUTPATIENT
Start: 2022-04-15 | End: 2022-04-15 | Stop reason: HOSPADM

## 2022-04-15 RX ORDER — OXYCODONE HYDROCHLORIDE 5 MG/1
5 TABLET ORAL
Status: DISCONTINUED | OUTPATIENT
Start: 2022-04-15 | End: 2022-04-15 | Stop reason: HOSPADM

## 2022-04-15 RX ORDER — PROPOFOL 10 MG/ML
INJECTION, EMULSION INTRAVENOUS PRN
Status: DISCONTINUED | OUTPATIENT
Start: 2022-04-15 | End: 2022-04-15 | Stop reason: SDUPTHER

## 2022-04-15 RX ORDER — SODIUM CHLORIDE 9 MG/ML
INJECTION, SOLUTION INTRAVENOUS CONTINUOUS PRN
Status: DISCONTINUED | OUTPATIENT
Start: 2022-04-15 | End: 2022-04-15 | Stop reason: SDUPTHER

## 2022-04-15 RX ORDER — ONDANSETRON 2 MG/ML
4 INJECTION INTRAMUSCULAR; INTRAVENOUS
Status: DISCONTINUED | OUTPATIENT
Start: 2022-04-15 | End: 2022-04-15 | Stop reason: HOSPADM

## 2022-04-15 RX ORDER — HYDRALAZINE HYDROCHLORIDE 20 MG/ML
5 INJECTION INTRAMUSCULAR; INTRAVENOUS
Status: DISCONTINUED | OUTPATIENT
Start: 2022-04-15 | End: 2022-04-15 | Stop reason: HOSPADM

## 2022-04-15 RX ORDER — SENNA AND DOCUSATE SODIUM 50; 8.6 MG/1; MG/1
2 TABLET, FILM COATED ORAL NIGHTLY
Status: DISCONTINUED | OUTPATIENT
Start: 2022-04-15 | End: 2022-04-27 | Stop reason: HOSPADM

## 2022-04-15 RX ADMIN — ONDANSETRON 4 MG: 2 INJECTION INTRAMUSCULAR; INTRAVENOUS at 08:33

## 2022-04-15 RX ADMIN — DIPHENHYDRAMINE HYDROCHLORIDE 12.5 MG: 25 TABLET ORAL at 12:33

## 2022-04-15 RX ADMIN — CEFAZOLIN SODIUM 2000 MG: 1 INJECTION, POWDER, FOR SOLUTION INTRAMUSCULAR; INTRAVENOUS at 08:16

## 2022-04-15 RX ADMIN — Medication 10 ML: at 11:49

## 2022-04-15 RX ADMIN — MONTELUKAST SODIUM 10 MG: 10 TABLET ORAL at 21:23

## 2022-04-15 RX ADMIN — ROCURONIUM BROMIDE 40 MG: 10 SOLUTION INTRAVENOUS at 07:58

## 2022-04-15 RX ADMIN — MIDAZOLAM 2 MG: 1 INJECTION INTRAMUSCULAR; INTRAVENOUS at 07:49

## 2022-04-15 RX ADMIN — LORAZEPAM 0.5 MG: 0.5 TABLET ORAL at 22:20

## 2022-04-15 RX ADMIN — GABAPENTIN 300 MG: 300 CAPSULE ORAL at 21:24

## 2022-04-15 RX ADMIN — METOPROLOL SUCCINATE 50 MG: 50 TABLET, EXTENDED RELEASE ORAL at 21:24

## 2022-04-15 RX ADMIN — GABAPENTIN 300 MG: 300 CAPSULE ORAL at 15:26

## 2022-04-15 RX ADMIN — OXYCODONE HYDROCHLORIDE 10 MG: 10 TABLET ORAL at 11:48

## 2022-04-15 RX ADMIN — LORAZEPAM 0.5 MG: 0.5 TABLET ORAL at 16:09

## 2022-04-15 RX ADMIN — OXYCODONE 5 MG: 5 TABLET ORAL at 22:20

## 2022-04-15 RX ADMIN — SENNOSIDES AND DOCUSATE SODIUM 2 TABLET: 50; 8.6 TABLET ORAL at 21:23

## 2022-04-15 RX ADMIN — OXYCODONE 5 MG: 5 TABLET ORAL at 17:59

## 2022-04-15 RX ADMIN — DEXAMETHASONE 4 MG: 4 TABLET ORAL at 21:24

## 2022-04-15 RX ADMIN — SUGAMMADEX 312 MG: 100 INJECTION, SOLUTION INTRAVENOUS at 08:35

## 2022-04-15 RX ADMIN — DEXAMETHASONE 4 MG: 4 TABLET ORAL at 11:48

## 2022-04-15 RX ADMIN — PROPOFOL 130 MG: 10 INJECTION, EMULSION INTRAVENOUS at 07:58

## 2022-04-15 RX ADMIN — METOPROLOL SUCCINATE 50 MG: 50 TABLET, EXTENDED RELEASE ORAL at 11:48

## 2022-04-15 RX ADMIN — DIPHENHYDRAMINE HYDROCHLORIDE 12.5 MG: 50 INJECTION, SOLUTION INTRAMUSCULAR; INTRAVENOUS at 08:47

## 2022-04-15 RX ADMIN — DEXAMETHASONE SODIUM PHOSPHATE 10 MG: 10 INJECTION INTRAMUSCULAR; INTRAVENOUS at 07:58

## 2022-04-15 RX ADMIN — PRAVASTATIN SODIUM 20 MG: 20 TABLET ORAL at 16:49

## 2022-04-15 RX ADMIN — FENTANYL CITRATE 100 MCG: 50 INJECTION, SOLUTION INTRAMUSCULAR; INTRAVENOUS at 07:58

## 2022-04-15 RX ADMIN — Medication 10 ML: at 21:23

## 2022-04-15 RX ADMIN — SODIUM CHLORIDE: 9 INJECTION, SOLUTION INTRAVENOUS at 07:57

## 2022-04-15 ASSESSMENT — PULMONARY FUNCTION TESTS
PIF_VALUE: 20
PIF_VALUE: 19
PIF_VALUE: 21
PIF_VALUE: 20
PIF_VALUE: 1
PIF_VALUE: 19
PIF_VALUE: 20
PIF_VALUE: 1
PIF_VALUE: 21
PIF_VALUE: 1
PIF_VALUE: 20
PIF_VALUE: 9
PIF_VALUE: 24
PIF_VALUE: 7
PIF_VALUE: 13
PIF_VALUE: 1
PIF_VALUE: 19
PIF_VALUE: 18
PIF_VALUE: 1
PIF_VALUE: 21
PIF_VALUE: 22
PIF_VALUE: 21
PIF_VALUE: 20
PIF_VALUE: 19
PIF_VALUE: 20
PIF_VALUE: 2
PIF_VALUE: 14
PIF_VALUE: 20
PIF_VALUE: 19
PIF_VALUE: 21
PIF_VALUE: 20
PIF_VALUE: 3
PIF_VALUE: 2
PIF_VALUE: 21
PIF_VALUE: 20
PIF_VALUE: 20
PIF_VALUE: 21
PIF_VALUE: 22
PIF_VALUE: 20
PIF_VALUE: 19
PIF_VALUE: 21
PIF_VALUE: 1
PIF_VALUE: 20
PIF_VALUE: 1
PIF_VALUE: 16
PIF_VALUE: 3
PIF_VALUE: 19
PIF_VALUE: 1
PIF_VALUE: 39
PIF_VALUE: 20
PIF_VALUE: 18
PIF_VALUE: 20
PIF_VALUE: 19
PIF_VALUE: 2
PIF_VALUE: 21

## 2022-04-15 ASSESSMENT — PAIN SCALES - GENERAL
PAINLEVEL_OUTOF10: 0
PAINLEVEL_OUTOF10: 0
PAINLEVEL_OUTOF10: 6
PAINLEVEL_OUTOF10: 0
PAINLEVEL_OUTOF10: 2
PAINLEVEL_OUTOF10: 10
PAINLEVEL_OUTOF10: 0
PAINLEVEL_OUTOF10: 7

## 2022-04-15 ASSESSMENT — PAIN DESCRIPTION - LOCATION: LOCATION: BACK

## 2022-04-15 ASSESSMENT — PAIN DESCRIPTION - ORIENTATION: ORIENTATION: MID

## 2022-04-15 ASSESSMENT — PAIN DESCRIPTION - PAIN TYPE: TYPE: ACUTE PAIN

## 2022-04-15 NOTE — PROGRESS NOTES
Palliative Care Department  [phone]  Palliative Care Progress Note  Provider KATHIE Hernandez CNP      PATIENT: Sudhakar Clarke  : 1955  MRN: [id number]  ADMISSION DATE: 2022  7:12 PM  Referring Provider: Sandra Ignacio MD    Palliative Medicine was consulted on hospital day 7 for assistance with Goals of care, Symptom management     HPI:     Taylor Pierson is a 61 y.o. y/o female with a history of static breast cancer, anxiety, COPD, depression, diabetes mellitus, hypertension, restless leg syndrome who presented to [facility]) on 2022 as a transfer from [facility] for a neurosurgery consultation due to cord compression at T9 from extensive metastases disease to  thoracic and lumbar spine. After have been evaluated by neurosurgery, patient is not interested in surgical intervention, and off-the-shelf TLSO brace was recommended. ASSESSMENT/PLAN:     Pertinent Hospital Diagnoses      Metastatic breast cancer   Cord compression of the lumbar and thoracic spine secondary to metastatic disease   UTI     Anxiety  o Lorazepam 0.5 mg p.o. every 4 hours as needed     Pain due to neoplasm  o Fentanyl 12 mcg patch every 72 hours  o Hydromorphone 1 mg every 4 hours as needed   o gabapentin 300 mg 3 times daily  o oxycodone 5 mg every 4 hours as needed  o Plan is for another palliative group to establish with her after d/c    · Constipation  · GlycoLax daily  · Senna S2 tabs daily    Palliative Care Encounter / Counseling Regarding Goals of Care  Please see detailed goals of care discussion as below   At this time, Sudhakar Clarke, Does have capacity for medical decision-making.   Capacity is time limited and situation/question specific   Outcome of goals of care meeting:  o Continue with current medical treatment  o Wishes for CODE STATUS to be changed to limited DNR CCA DNI  o Does not want aggressive surgical intervention, but is hoping for conservative treatment.  Code status Limited DNR CCA DNI   Advanced Directives: no POA or living will in [facility]   Surrogate/Legal NOK:    Spiritual assessment: no spiritual distress identified  Bereavement and grief: to be determined  Referrals to: none today    Thank you for the opportunity to participate in the care of Didi Castanon. Hong Brandon, APRN - CNP  Palliative Medicine     SUBJECTIVE:     Details of Conversation:    Chart reviewed and met with Nathaly Renteria at bedside, with her family. She is currently in excellent spirits, the most part is already that she is feeling very good. She tolerated her biopsy very well today, as well as has been tolerating radiation therapy well. Her pain is very well managed with current regimen. She does report that she typically has chronic constipation at home, and forces her self to move her bowels, this often requires significant straining. Discussed the importance of utilizing bowel regimen, and she is agreeable to starting a bowel regimen while she is currently on opioids. She has no other particular complaints at this point time,, will not make any additional changes to her plan of care. She did state they are working on establishing a home health care group for when she discharges.     Prognosis: Guarded    OBJECTIVE:     /82   Pulse 82   Temp 97 °F (36.1 °C)   Resp 15   Ht 5' (1.524 m)   Wt 172 lb (78 kg)   SpO2 93%   BMI 33.59 kg/m²     Physical Examination:  Gen: elderly, thin, NAD, awake, alert   HEENT: normocephalic, atraumatic, PERRL, EOMI,   Neck: trachea midline, no JVD  Lungs: respirations easy and not labored,   Heart: regular rate and rhythm, distant heart tones,   Abdomen: normoactive bowel sounds, soft, non-tender  Extremities: no clubbing, cyanosis or edema, moving all extremities    Skin: warm, dry without rashes, lesions, bruising  Neuro: awake, alert, oriented x 3, follows commands, no gross neurologic deficit    Objective data reviewed: labs, images, records, medication use, vitals and chart    Time/Communication  Greater than 50% of time spent, total 25 minutes in counseling and coordination of care at the bedside regarding goals of care and symptom management. Thank you for allowing Palliative Medicine to participate in the care of Melissa Carrillo. Note: This report was completed using computer[employer] voiced recognition software. Every effort has been made to ensure accuracy; however, inadvertent computerized transcription errors may be present.

## 2022-04-15 NOTE — PROGRESS NOTES
Hospitalist Progress Note      PCP: KATHIE SOMERS CNP    Date of Admission: 4/8/2022  Days in the hospital: [address] Course:   Patient transferred from [facility] a history of metastatic breast cancer.  Also history of hypertension, COPD and DVT, chronically anticoagulated on Eliquis.  Patient has been having back pain since November with radiation down her legs.  She reported some intermittent incontinence of urine over the past month.  Diagnosed with urinary tract infection and started on Rocephin.  CT revealed extensive metastatic disease to the right humerus, ribs, liver, lymph nodes and thoracic and lumbar spines causing cord compression at T9.  Patient was transferred to [facility] for neurosurgery consultation. Placed in TLSO brace. CT chest here showing mediastinal LAD and L adrenal nodule as well as multiple bone metastasis with pathologic fracture.  MRI of the thoracic and lumbar spine done which showed extensive metastatic disease involving the spine and also pathological fracture involving T9. Subjective  Patient seen and examined at bedside. Patient underwent L4 biopsy today, currently in recovery, discussed with neurosurgery. She otherwise denies any headache or dizziness. Chart reviewed, night events reviewed. Exam:    /83   Pulse 58   Temp 97.5 °F (36.4 °C)   Resp 18   Ht 5' (1.524 m)   Wt 172 lb (78 kg)   SpO2 95%   BMI 33.59 kg/m²       HEENT: + Pallor, no icterus. Respiratory:  CTA, good air entry. Cardiovascular: RRR, no murmur. Abdomen: Soft, non-tender, BS noted. Musculoskeletal: No joint pains or joint swelling noted. Neurologic: awake, alert and following commands         Assessment/Plan:  Cord compression of the lumbar and thoracic spines secondary to metastatic disease: neurosurgery on the case. Pain control.  Palliative care consulted for symptoms management.  Continue with Decadron, plan is for biopsy on Friday, continue with serial neuro checks.   MRI of the T and L-spine reviewed and underwent L4 biopsy today. Metastatic breast cancer: Oncology following. Patient has imagines on a disc from [facility], disc is at home.  Radiation oncology following. 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 Hypokalemia: Potassium corrected  Disposition: Plan is for home hopefully tomorrow    Labs:   Recent Labs     04/15/22  0550   WBC 8.4   HGB 10.8*   HCT 33.4*        Recent Labs     04/13/22  1140 04/14/22  0100 04/15/22  0550   * 126* 137   K 2.5* 3.7 3.6   CL 84* 85* 99   CO2 25 27 28   BUN 26* 27* 19   CREATININE 0.7 0.7 0.5   CALCIUM 9.7 9.2 9.0     No results for input(s): AST, ALT, BILIDIR, BILITOT, ALKPHOS in the last 72 hours. No results for input(s): INR in the last 72 hours. No results for input(s): James Altes in the last 72 hours.     Medications:  Reviewed    Infusion Medications    sodium chloride      sodium chloride       Scheduled Medications    sodium chloride flush  5-40 mL IntraVENous 2 times per day    ceFAZolin  2,000 mg IntraVENous See Admin Instructions    gabapentin  300 mg Oral TID    dexamethasone  4 mg Oral 2 times per day    pantoprazole  40 mg Oral QAM AC    metoprolol succinate  50 mg Oral BID    montelukast  10 mg Oral Nightly    pravastatin  20 mg Oral Dinner    sodium chloride flush  10 mL IntraVENous 2 times per day    cefTRIAXone (ROCEPHIN) IV  1,000 mg IntraVENous Q24H    fentaNYL  1 patch TransDERmal Q72H     PRN Meds: sodium chloride flush, sodium chloride, meperidine, HYDROmorphone, HYDROmorphone, oxyCODONE, ondansetron, droperidol, diphenhydrAMINE, labetalol **OR** hydrALAZINE, ipratropium-albuterol, oxyCODONE **OR** oxyCODONE, LORazepam, albuterol, zolpidem, sodium chloride flush, sodium chloride, promethazine **OR** ondansetron, polyethylene glycol, HYDROmorphone      Intake/Output Summary (Last 24 hours) at 4/15/2022 1029  Last data filed at 4/15/2022 0856  Gross per 24 hour   Intake 2724.49 ml   Output 2105 ml   Net 619.49 ml     · Body mass index is 33.59 kg/m². · Diet  · Diet NPO    · Code Status  · Limited     Electronically signed by Tristin Oneal MD on 4/15/2022 at 10:29 AM  [employer]   Please contact me through perfect serve    NOTE: This report was transcribed using voice recognition software. Every effort was made to ensure accuracy; however, inadvertent computerized transcription errors may be present.

## 2022-04-15 NOTE — PLAN OF CARE
Problem: Skin Integrity:  Goal: Absence of new skin breakdown  Description: Absence of new skin breakdown  4/15/2022 1014 by Carlos Anna RN  Outcome: Met This Shift  4/15/2022 0559 by Francisco J Huerta RN  Outcome: Met This Shift     Problem: Physical Regulation:  Goal: Ability to maintain vital signs within normal range will improve  Description: Ability to maintain vital signs within normal range will improve  Outcome: Met This Shift     Problem: Falls - Risk of:  Goal: Will remain free from falls  Description: Will remain free from falls  Outcome: Met This Shift     Problem: Falls - Risk of:  Goal: Absence of physical injury  Description: Absence of physical injury  Outcome: Met This Shift

## 2022-04-15 NOTE — BRIEF OP NOTE
Brief Postoperative Note      Patient: Kiera Ulloa  YOB: 1955  MRN: [id number]    Date of Procedure: 4/15/2022    Pre-Op Diagnosis: metastatic breast CA    Post-Op Diagnosis: Same       Procedure(s):  VETEBRAL BODY LEFT L-4 BIOPSY    Surgeon(s):  Chandrika Hinds MD    Assistant:  * No surgical staff found *    Anesthesia: General    Estimated Blood Loss (mL): Minimal    Complications: None    Specimens:   ID Type Source Tests Collected by Time Destination   A : VETERBRAL BODY LEFT L-4 BIOPSY Tissue Tissue SURGICAL PATHOLOGY Chandrika Hinds MD 4/15/2022 4430        Implants:  * No implants in log *      Drains:   Closed/Suction Drain Right;Lateral Chest Bulb (Active)       Closed/Suction Drain Right Chest Bulb (Active)       Closed/Suction Drain Left;Lateral Chest Bulb (Active)       Closed/Suction Drain Left Breast Bulb (Active)       Closed/Suction Drain Right Breast Bulb (Active)       External Urinary Catheter (Active)   Catheter changed  Yes 04/14/22 1837   Urine Color Yellow 04/14/22 1837   Urine Appearance Clear 04/14/22 1837   Output (mL) 1100 mL 04/14/22 1837       Findings: bone biopsy from L4 sent for pathology     Electronically signed by Chandrika Hinds MD on 4/15/2022 at 9:31 AM

## 2022-04-15 NOTE — OP NOTE
[address]                  [address] [facility],  [facility]                                OPERATIVE REPORT    PATIENT NAME: Ben Carrington                      :        1955  MED REC NO:   [id number]                            ROOM:       [id number]  ACCOUNT NO:   [de-identified]                           ADMIT DATE: 2022  PROVIDER:     Sabra Ortiz MD    DATE OF PROCEDURE:  04/15/2022    PREOPERATIVE DIAGNOSIS:  Metastatic cancer throughout the spine. POSTOPERATIVE DIAGNOSIS:  Metastatic cancer throughout the spine. PROCEDURE PERFORMED:  Transcutaneous transpedicular biopsy of L4  vertebral body for diagnosis and pathology of metastasis. ATTENDING SURGEON:  Sabra Ortiz MD    ANESTHESIA:  General endotracheal.    INDICATION:  This lady underwent bilateral mastectomies for breast  cancer many years ago, presented with back pain, has some cord  compression at T9 and had diffuse osseous mets throughout the spinal  column and also right head of the humerus. After obtaining informed  consent and discussing the various techniques to obtain tissue, it was  decided by her that she wanted to be under general anesthesia and  undergo biopsy of one of her spine metastasis. So, her wishes were  respected, she was taken to the OR electively and underwent this  procedure. DESCRIPTION OF PROCEDURE:  Following induction of general endotracheal  anesthesia, she was turned to prone position on the OSI spine table. Timeout obtained by the entire team.  Films reviewed. Consensus  obtained as to the procedure be performed. Given intravenous  antibiotics and prepped and draped per routine fashion. Two  fluoroscopies were used throughout the procedure. The left L4 pedicle  was identified using the kyphoplasty equipments.   The Kyphon introducer  needle and sheath was guided under fluoroscopy through the left L4  pedicle into the posterior aspect of vertebral body. Biopsy was  obtained. There was bony material visualized and sent for pathology in  formaldehyde. There were no apparent complications. This was done  under fluoroscopic control through a stab incision. Tolerated the  procedure well. Glue used to close the small stab incision over L4 and  taken to recovery in stable condition without evidence for  complications.   Estimated blood loss minimal.        Yong Mukherjee MD    D: 04/15/2022 9:53:11       T: 04/15/2022 9:57:14     [email or web address]  Job#: [id number]     Doc#: [id number]    CC:

## 2022-04-15 NOTE — PLAN OF CARE
Problem: Activity:  Goal: Ability to avoid complications of mobility impairment will improve  Description: Ability to avoid complications of mobility impairment will improve  Outcome: Ongoing  Goal: Ability to tolerate increased activity will improve  Description: Ability to tolerate increased activity will improve  Outcome: Ongoing     Problem:  Bowel/Gastric:  Goal: Gastrointestinal status for postoperative course will improve  Description: Gastrointestinal status for postoperative course will improve  Outcome: Ongoing     Problem: Fluid Volume:  Goal: Maintenance of adequate hydration will improve  Description: Maintenance of adequate hydration will improve  Outcome: Ongoing     Problem: Health Behavior:  Goal: Identification of resources available to assist in meeting health care needs will improve  Description: Identification of resources available to assist in meeting health care needs will improve  Outcome: Ongoing     Problem: Skin Integrity:  Goal: Will show no infection signs and symptoms  Description: Will show no infection signs and symptoms  Outcome: Met This Shift  Goal: Absence of new skin breakdown  Description: Absence of new skin breakdown  Outcome: Met This Shift  Goal: Risk for impaired skin integrity will decrease  Description: Risk for impaired skin integrity will decrease  Outcome: Met This Shift  Goal: Demonstration of wound healing without infection will improve  Description: Demonstration of wound healing without infection will improve  Outcome: Met This Shift  Goal: Complications related to intravenous access or infusion will be avoided or minimized  Description: Complications related to intravenous access or infusion will be avoided or minimized  Outcome: Met This Shift     Problem: Infection - Central Venous Catheter-Associated Bloodstream Infection:  Goal: Will show no infection signs and symptoms  Description: Will show no infection signs and symptoms  Outcome: Met This Shift      Pt utilizes call light appropriately, repositioned self in bed appropriately. No new skin issues noted this shift. Iv fluids continue to infuse per order. Pt. Has been NPO since 12am for biopsy this am. VS WNL no changes to physical assessment.

## 2022-04-15 NOTE — ONCOLOGY
Patient received radiation treatment 3/10 to her T-spine. Per Dr. Elissa Rios prescription, patient received 300 cGy of radiation today.

## 2022-04-15 NOTE — CARE COORDINATION
SOCIAL WORK/CASEMANAGEMENT TRANSITION OF CARE [id number] Anusha Burris [facility], [address], [place], [phone]): pt out of room for biopsy. Tentative discharge home today. [facility] at [phone] fax [phone], referral made and they will assess case and all on Monday or later about acceptance. If pt is discharged then this will not hold her up to go home. If [facility] doesn't take pt then she will have to follow up with her hem/onc doctor for alternatives. REID Powers  4/15/2022

## 2022-04-16 LAB
ANION GAP SERPL CALCULATED.3IONS-SCNC: 11 MMOL/L (ref 7–16)
BUN BLDV-MCNC: 20 MG/DL (ref 6–23)
CA 27.29: 811.5 U/ML
CALCIUM SERPL-MCNC: 9.1 MG/DL (ref 8.6–10.2)
CHLORIDE BLD-SCNC: 97 MMOL/L (ref 98–107)
CO2: 26 MMOL/L (ref 22–29)
CREAT SERPL-MCNC: 0.5 MG/DL (ref 0.5–1)
GFR AFRICAN AMERICAN: >60
GFR NON-AFRICAN AMERICAN: >60 ML/MIN/1.73
GLUCOSE BLD-MCNC: 134 MG/DL (ref 74–99)
HCT VFR BLD CALC: 32.3 % (ref 34–48)
HEMOGLOBIN: 10.5 G/DL (ref 11.5–15.5)
MCH RBC QN AUTO: 28.8 PG (ref 26–35)
MCHC RBC AUTO-ENTMCNC: 32.5 % (ref 32–34.5)
MCV RBC AUTO: 88.5 FL (ref 80–99.9)
PDW BLD-RTO: 14.6 FL (ref 11.5–15)
PLATELET # BLD: 282 E9/L (ref 130–450)
PMV BLD AUTO: 9.3 FL (ref 7–12)
POTASSIUM SERPL-SCNC: 3.8 MMOL/L (ref 3.5–5)
RBC # BLD: 3.65 E12/L (ref 3.5–5.5)
SODIUM BLD-SCNC: 134 MMOL/L (ref 132–146)
WBC # BLD: 7.2 E9/L (ref 4.5–11.5)

## 2022-04-16 PROCEDURE — 85027 COMPLETE CBC AUTOMATED: CPT

## 2022-04-16 PROCEDURE — 6360000002 HC RX W HCPCS: Performed by: NEUROLOGICAL SURGERY

## 2022-04-16 PROCEDURE — 6370000000 HC RX 637 (ALT 250 FOR IP): Performed by: NEUROLOGICAL SURGERY

## 2022-04-16 PROCEDURE — 6370000000 HC RX 637 (ALT 250 FOR IP): Performed by: NURSE PRACTITIONER

## 2022-04-16 PROCEDURE — 2140000000 HC CCU INTERMEDIATE R&B

## 2022-04-16 PROCEDURE — 99231 SBSQ HOSP IP/OBS SF/LOW 25: CPT | Performed by: NURSE PRACTITIONER

## 2022-04-16 PROCEDURE — 80048 BASIC METABOLIC PNL TOTAL CA: CPT

## 2022-04-16 PROCEDURE — 2580000003 HC RX 258: Performed by: NEUROLOGICAL SURGERY

## 2022-04-16 RX ORDER — OXYCODONE HYDROCHLORIDE 10 MG/1
10 TABLET ORAL EVERY 4 HOURS PRN
Status: DISCONTINUED | OUTPATIENT
Start: 2022-04-16 | End: 2022-04-20

## 2022-04-16 RX ADMIN — POLYETHYLENE GLYCOL 3350 17 G: 17 POWDER, FOR SOLUTION ORAL at 08:50

## 2022-04-16 RX ADMIN — Medication 10 ML: at 08:50

## 2022-04-16 RX ADMIN — PANTOPRAZOLE SODIUM 40 MG: 40 TABLET, DELAYED RELEASE ORAL at 06:01

## 2022-04-16 RX ADMIN — GABAPENTIN 300 MG: 300 CAPSULE ORAL at 08:50

## 2022-04-16 RX ADMIN — DEXAMETHASONE 4 MG: 4 TABLET ORAL at 21:11

## 2022-04-16 RX ADMIN — LORAZEPAM 0.5 MG: 0.5 TABLET ORAL at 23:43

## 2022-04-16 RX ADMIN — OXYCODONE HYDROCHLORIDE 10 MG: 10 TABLET ORAL at 21:15

## 2022-04-16 RX ADMIN — OXYCODONE 5 MG: 5 TABLET ORAL at 16:44

## 2022-04-16 RX ADMIN — GABAPENTIN 300 MG: 300 CAPSULE ORAL at 15:08

## 2022-04-16 RX ADMIN — LORAZEPAM 0.5 MG: 0.5 TABLET ORAL at 16:42

## 2022-04-16 RX ADMIN — LORAZEPAM 0.5 MG: 0.5 TABLET ORAL at 11:04

## 2022-04-16 RX ADMIN — METOPROLOL SUCCINATE 50 MG: 50 TABLET, EXTENDED RELEASE ORAL at 08:50

## 2022-04-16 RX ADMIN — DEXAMETHASONE 4 MG: 4 TABLET ORAL at 08:50

## 2022-04-16 RX ADMIN — APIXABAN 5 MG: 5 TABLET, FILM COATED ORAL at 21:11

## 2022-04-16 RX ADMIN — GABAPENTIN 300 MG: 300 CAPSULE ORAL at 21:11

## 2022-04-16 RX ADMIN — MONTELUKAST SODIUM 10 MG: 10 TABLET ORAL at 21:11

## 2022-04-16 RX ADMIN — OXYCODONE 5 MG: 5 TABLET ORAL at 11:02

## 2022-04-16 RX ADMIN — APIXABAN 5 MG: 5 TABLET, FILM COATED ORAL at 12:09

## 2022-04-16 RX ADMIN — SENNOSIDES AND DOCUSATE SODIUM 2 TABLET: 50; 8.6 TABLET ORAL at 21:11

## 2022-04-16 RX ADMIN — PRAVASTATIN SODIUM 20 MG: 20 TABLET ORAL at 17:13

## 2022-04-16 RX ADMIN — METOPROLOL SUCCINATE 50 MG: 50 TABLET, EXTENDED RELEASE ORAL at 21:11

## 2022-04-16 RX ADMIN — HYDROMORPHONE HYDROCHLORIDE 0.5 MG: 1 INJECTION, SOLUTION INTRAMUSCULAR; INTRAVENOUS; SUBCUTANEOUS at 12:12

## 2022-04-16 RX ADMIN — Medication 10 ML: at 21:12

## 2022-04-16 ASSESSMENT — PAIN DESCRIPTION - ORIENTATION
ORIENTATION: MID

## 2022-04-16 ASSESSMENT — PAIN SCALES - GENERAL
PAINLEVEL_OUTOF10: 9
PAINLEVEL_OUTOF10: 3
PAINLEVEL_OUTOF10: 6
PAINLEVEL_OUTOF10: 3
PAINLEVEL_OUTOF10: 6
PAINLEVEL_OUTOF10: 2
PAINLEVEL_OUTOF10: 9

## 2022-04-16 ASSESSMENT — PAIN DESCRIPTION - ONSET
ONSET: ON-GOING

## 2022-04-16 ASSESSMENT — PAIN DESCRIPTION - DESCRIPTORS
DESCRIPTORS: ACHING;DISCOMFORT;SORE

## 2022-04-16 ASSESSMENT — PAIN DESCRIPTION - PAIN TYPE
TYPE: ACUTE PAIN

## 2022-04-16 ASSESSMENT — PAIN DESCRIPTION - PROGRESSION
CLINICAL_PROGRESSION: NOT CHANGED

## 2022-04-16 ASSESSMENT — PAIN - FUNCTIONAL ASSESSMENT
PAIN_FUNCTIONAL_ASSESSMENT: PREVENTS OR INTERFERES SOME ACTIVE ACTIVITIES AND ADLS

## 2022-04-16 ASSESSMENT — PAIN DESCRIPTION - FREQUENCY
FREQUENCY: INTERMITTENT

## 2022-04-16 ASSESSMENT — PAIN DESCRIPTION - LOCATION
LOCATION: BACK

## 2022-04-16 NOTE — PATIENT CARE CONFERENCE
P Quality Flow/Interdisciplinary Rounds Progress Note        Quality Flow Rounds held on April 16, 2022    Disciplines Attending:  Bedside Nurse, ,  and Nursing Unit [address] was admitted on 4/8/2022  7:12 PM    Anticipated Discharge Date:  Expected Discharge Date: 04/16/22    Disposition:    Umberto Score:  Umberto Scale Score: 20    Readmission Risk              Risk of Unplanned Readmission:  13           Discussed patient goal for the day, patient clinical progression, and barriers to discharge.   The following Goal(s) of the Day/Commitment(s) have been identified:  Diagnostics - Report Results      Erin Arevalo RN  April 16, 2022

## 2022-04-16 NOTE — PLAN OF CARE
Problem: Skin Integrity:  Goal: Will show no infection signs and symptoms  Description: Will show no infection signs and symptoms  Outcome: Met This Shift     Problem: Skin Integrity:  Goal: Absence of new skin breakdown  Description: Absence of new skin breakdown  Outcome: Met This Shift     Problem: Skin Integrity:  Goal: Risk for impaired skin integrity will decrease  Description: Risk for impaired skin integrity will decrease  Outcome: Met This Shift     Problem: Skin Integrity:  Goal: Demonstration of wound healing without infection will improve  Description: Demonstration of wound healing without infection will improve  Outcome: Met This Shift     Problem: Skin Integrity:  Goal: Complications related to intravenous access or infusion will be avoided or minimized  Description: Complications related to intravenous access or infusion will be avoided or minimized  Outcome: Met This Shift     Problem: Infection - Central Venous Catheter-Associated Bloodstream Infection:  Goal: Will show no infection signs and symptoms  Description: Will show no infection signs and symptoms  Outcome: Met This Shift

## 2022-04-16 NOTE — PROGRESS NOTES
Hospitalist Progress Note      PCP: Fabiola Matamoros, APRN - CNP    Date of Admission: 4/8/2022    Chief Complaint:     Hospital Course: Patient transferred from [facility] a history of metastatic breast cancer.  Also history of hypertension, COPD and DVT, chronically anticoagulated on Eliquis.  Patient has been having back pain since November with radiation down her legs.  She reported some intermittent incontinence of urine over the past month.  Diagnosed with urinary tract infection and started on Rocephin.  CT revealed extensive metastatic disease to the right humerus, ribs, liver, lymph nodes and thoracic and lumbar spines causing cord compression at T9.  Patient was transferred to [facility] for neurosurgery consultation. Placed in TLSO brace. CT chest here showing mediastinal LAD and L adrenal nodule as well as multiple bone metastasis with pathologic fracture.  MRI of the thoracic and lumbar spine done which showed extensive metastatic disease involving the spine and also pathological fracture involving T9. Neurosurgical procedure option was ruled out, patient was started on radiation therapy.   Anticoagulation was resumed         Medications:  Reviewed    Infusion Medications    sodium chloride       Scheduled Medications    apixaban  5 mg Oral BID    polyethylene glycol  17 g Oral Daily    sennosides-docusate sodium  2 tablet Oral Nightly    gabapentin  300 mg Oral TID    dexamethasone  4 mg Oral 2 times per day    pantoprazole  40 mg Oral QAM AC    metoprolol succinate  50 mg Oral BID    montelukast  10 mg Oral Nightly    pravastatin  20 mg Oral Dinner    sodium chloride flush  10 mL IntraVENous 2 times per day    fentaNYL  1 patch TransDERmal Q72H     PRN Meds: HYDROmorphone, diphenhydrAMINE, oxyCODONE **OR** [DISCONTINUED] oxyCODONE, LORazepam, albuterol, zolpidem, sodium chloride flush, sodium chloride, promethazine **OR** ondansetron      Intake/Output Summary (Last 24 hours) at 4/16/2022 1429  Last data filed at 4/16/2022 1400  Gross per 24 hour   Intake 600 ml   Output 2950 ml   Net -2350 ml       Exam:    /72   Pulse 72   Temp 97.7 °F (36.5 °C) (Temporal)   Resp 16   Ht 5' (1.524 m)   Wt 172 lb (78 kg)   SpO2 100%   BMI 33.59 kg/m²     HEENT: + Pallor, no icterus. Respiratory:  CTA, good air entry. Cardiovascular: RRR, no murmur. Abdomen: Soft, non-tender, BS noted. Musculoskeletal: No joint pains or joint swelling noted. Neurologic: awake, alert and following commands   Labs:   Recent Labs     04/15/22  0550 04/16/22  0530   WBC 8.4 7.2   HGB 10.8* 10.5*   HCT 33.4* 32.3*    282     Recent Labs     04/14/22  0100 04/15/22  0550 04/16/22  0530   * 137 134   K 3.7 3.6 3.8   CL 85* 99 97*   CO2 27 28 26   BUN 27* 19 20   CREATININE 0.7 0.5 0.5   CALCIUM 9.2 9.0 9.1     No results for input(s): AST, ALT, BILIDIR, BILITOT, ALKPHOS in the last 72 hours. No results for input(s): INR in the last 72 hours. No results for input(s): Connee Matar in the last 72 hours. Assessment/Plan:  Cord compression of the lumbar and thoracic spines secondary to metastatic disease: neurosurgery on the case. Pain control. Palliative care consulted for symptoms management.  Continue with Decadron, plan is for biopsy on Friday, continue with serial neuro checks.   MRI of the T and L-spine reviewed and underwent L4 biopsy today. Metastatic breast cancer: Oncology following. Patient has imagines on a disc from [facility], disc is at home.  Radiation therapy was initiated. 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   Hypokalemia: Potassium corrected      Active Hospital Problems    Diagnosis Date Noted    UTI (urinary tract infection) [N39.0] 04/09/2022    Debility [R53.81] 04/09/2022    Cord compression ([facility] [G95.20] 04/08/2022    Intractable back pain [M54.9] 04/08/2022    Breast cancer metastasized to axillary lymph node ([facility] [address] [C50.919, C77.3] 12/12/2012         DVT Prophylaxis: Eliquis   diet: ADULT DIET;  Regular  Code Status: Natalee Collins MD

## 2022-04-16 NOTE — PLAN OF CARE
Problem: Skin Integrity:  Goal: Will show no infection signs and symptoms  Description: Will show no infection signs and symptoms  4/16/2022 1549 by Octavia Villeda RN  Outcome: Met This Shift     Problem: Skin Integrity:  Goal: Absence of new skin breakdown  Description: Absence of new skin breakdown  4/16/2022 1549 by Octavia Villeda RN  Outcome: Met This Shift     Problem: Skin Integrity:  Goal: Risk for impaired skin integrity will decrease  Description: Risk for impaired skin integrity will decrease  4/16/2022 1549 by Octavia Villeda RN  Outcome: Met This Shift     Problem: Skin Integrity:  Goal: Demonstration of wound healing without infection will improve  Description: Demonstration of wound healing without infection will improve  4/16/2022 1549 by Octavia Villeda RN  Outcome: Met This Shift

## 2022-04-16 NOTE — PROGRESS NOTES
Neurosurg progress note  VITALS:  /77   Pulse 72   Temp 97.7 °F (36.5 °C) (Temporal)   Resp 16   Ht 5' (1.524 m)   Wt 172 lb (78 kg)   SpO2 100%   BMI 33.59 kg/m²   24HR INTAKE/OUTPUT:    Intake/Output Summary (Last 24 hours) at 4/16/2022 1040  Last data filed at 4/16/2022 0951  Gross per 24 hour   Intake 840 ml   Output 2450 ml   Net -1610 ml     FLUORO FOR SURGICAL PROCEDURES    Result Date: 4/15/2022  EXAMINATION: SPOT FLUOROSCOPIC IMAGES 4/15/2022 8:56 am TECHNIQUE: Fluoroscopy was provided by the radiology department for procedure. Radiologist was not present during examination. FLUOROSCOPY DOSE AND TYPE OR TIME AND EXPOSURES: Fluoroscopy time equals 12.9 seconds. Total dose equals 7.46 mGy. COMPARISON: None HISTORY: ORDERING SYSTEM PROVIDED HISTORY: L4 bone biopsy TECHNOLOGIST PROVIDED HISTORY: Reason for exam:->L4 bone biopsy What reading provider will be dictating this exam?->CRC Intraprocedural imaging. FINDINGS: 1 spot images of the lumbar spine were obtained. Intraprocedural fluoroscopic spot images as above. See separate procedure report for more information.      CBC:   Lab Results   Component Value Date    WBC 7.2 04/16/2022    RBC 3.65 04/16/2022    HGB 10.5 04/16/2022    HCT 32.3 04/16/2022    MCV 88.5 04/16/2022    MCH 28.8 04/16/2022    MCHC 32.5 04/16/2022    RDW 14.6 04/16/2022     04/16/2022    MPV 9.3 04/16/2022     BMP:    Lab Results   Component Value Date     04/16/2022    K 3.8 04/16/2022    K 3.7 04/14/2022    CL 97 04/16/2022    CO2 26 04/16/2022    BUN 20 04/16/2022    LABALBU 4.0 04/10/2022    CREATININE 0.5 04/16/2022    CALCIUM 9.1 04/16/2022    GFRAA >60 04/16/2022    LABGLOM >60 04/16/2022    LABGLOM >60 05/30/2017    GLUCOSE 134 04/16/2022    GLUCOSE 178 05/30/2017      apixaban  5 mg Oral BID    polyethylene glycol  17 g Oral Daily    sennosides-docusate sodium  2 tablet Oral Nightly    gabapentin  300 mg Oral TID    dexamethasone  4 mg Oral 2 times per day    pantoprazole  40 mg Oral QAM AC    metoprolol succinate  50 mg Oral BID    montelukast  10 mg Oral Nightly    pravastatin  20 mg Oral Dinner    sodium chloride flush  10 mL IntraVENous 2 times per day    fentaNYL  1 patch TransDERmal Q72H     Awake and alert oriented x3 follows complex commands speech is fluent pupils equal round reactive extraocular is full no motor deficits no bleeding from the puncture site in the lumbar region  Assessment:  Patient Active Problem List   Diagnosis    S/P mastectomy, bilateral    Breast cancer metastasized to axillary lymph node (HCC)    Cord compression (HCC)    Intractable back pain    UTI (urinary tract infection)    Debility     Plan: Okay to restart Eliquis needs PT OT to see and evaluate her in a sit up on the bed prior to putting the brace on but needs to continue to maintain neutral spine position continue current care  Dada Fam MD M.D.

## 2022-04-16 NOTE — PROGRESS NOTES
Messaged doctor again for dilaudid order. Waiting new orders. Palliative Dr gave order for dilaudid 1mg IV every 4 hours for pain, and I was advised to DC the Dilaudid 0.5mg IV every 5 minutes. Was advised to DC oxycodone 5mg every 4 hours, and new order for oxycodone 10mg every 4 was was placed.

## 2022-04-16 NOTE — PROGRESS NOTES
Notified Dr. Alfredo Gleason order  Is PRN every 5 minutes. Asked if order can be updated or switched from surgery.

## 2022-04-16 NOTE — PROGRESS NOTES
Palliative Care Department  [phone]  Palliative Care Progress Note  Provider Charlanne Bence, APRN - CNP      PATIENT: Author Jacques  : 1955  MRN: [id number]  ADMISSION DATE: 2022  7:12 PM  Referring Provider: Toño Longoria MD    Palliative Medicine was consulted on hospital day 8 for assistance with Goals of care, Symptom management     HPI:     Juan Corrigan is a 61 y.o. y/o female with a history of static breast cancer, anxiety, COPD, depression, diabetes mellitus, hypertension, restless leg syndrome who presented to [facility]) on 2022 as a transfer from [facility] for a neurosurgery consultation due to cord compression at T9 from extensive metastases disease to  thoracic and lumbar spine. After have been evaluated by neurosurgery, patient is not interested in surgical intervention, and off-the-shelf TLSO brace was recommended. ASSESSMENT/PLAN:     Pertinent Hospital Diagnoses      Metastatic breast cancer   Cord compression of the lumbar and thoracic spine secondary to metastatic disease   UTI     Anxiety  o Lorazepam 0.5 mg p.o. every 4 hours as needed     Pain due to neoplasm  o Fentanyl 12 mcg patch every 72 hours  o Hydromorphone 1 mg every 4 hours as needed   o gabapentin 300 mg 3 times daily  o oxycodone 5 mg every 4 hours as needed  o Plan is for another palliative group to establish with her after d/c    · Constipation  · GlycoLax daily  · Senna S2 tabs daily    Palliative Care Encounter / Counseling Regarding Goals of Care  Please see detailed goals of care discussion as below   At this time, Author Sawyer, Does have capacity for medical decision-making.   Capacity is time limited and situation/question specific   Outcome of goals of care meeting:  o Continue with current medical treatment  o Wishes for CODE STATUS to be changed to limited DNR CCA DNI  o Does not want aggressive surgical intervention, but is hoping for conservative treatment.  Code status Limited DNR CCA DNI   Advanced Directives: no POA or living will in epic   Surrogate/Legal NOK:    Spiritual assessment: no spiritual distress identified  Bereavement and grief: to be determined  Referrals to: none today    Thank you for the opportunity to participate in the care of Stephanie Ordaz. Willam Oliva, APRN - CNP  Palliative Medicine     SUBJECTIVE:     Details of Conversation:    Chart reviewed and met with [facility] at bedside, with her family. Patient states pain has been well managed with current regimen. Patient does state that if pain gets to be 10/10, she requires a dose of Dilaudid to help alleviate the pain. Patient denies immediate needs from palliative medicine at this time. Continue current management. Prognosis: Guarded    OBJECTIVE:     /60   Pulse 73   Temp 98.1 °F (36.7 °C) (Oral)   Resp 16   Ht 5' (1.524 m)   Wt 172 lb (78 kg)   SpO2 100%   BMI 33.59 kg/m²     Physical Examination:  Gen:  NAD, awake, alert   HEENT: normocephalic, atraumatic, PERRL, EOMI,   Neck: trachea midline, no JVD  Lungs: respirations easy and not labored,   Heart: regular rate and rhythm, distant heart tones,   Abdomen: normoactive bowel sounds, soft, non-tender  Extremities: no clubbing, cyanosis or edema, moving all extremities    Skin: warm, dry without rashes, lesions, bruising  Neuro: awake, alert, oriented x 3, follows commands, no gross neurologic deficit    Objective data reviewed: labs, images, records, medication use, vitals and chart    Time/Communication  Greater than 50% of time spent, total 15 minutes in counseling and coordination of care at the bedside regarding goals of care and symptom management. Thank you for allowing Palliative Medicine to participate in the care of Stephanie Ordaz. Note: This report was completed using computerize voiced recognition software.   Every effort has been made to ensure accuracy; however, inadvertent computerized transcription errors may be present.

## 2022-04-17 PROCEDURE — 6370000000 HC RX 637 (ALT 250 FOR IP): Performed by: NURSE PRACTITIONER

## 2022-04-17 PROCEDURE — 6370000000 HC RX 637 (ALT 250 FOR IP): Performed by: NEUROLOGICAL SURGERY

## 2022-04-17 PROCEDURE — 6360000002 HC RX W HCPCS: Performed by: NEUROLOGICAL SURGERY

## 2022-04-17 PROCEDURE — 2580000003 HC RX 258: Performed by: NEUROLOGICAL SURGERY

## 2022-04-17 PROCEDURE — 2140000000 HC CCU INTERMEDIATE R&B

## 2022-04-17 RX ORDER — HYDROXYZINE PAMOATE 25 MG/1
25 CAPSULE ORAL 3 TIMES DAILY PRN
Status: DISCONTINUED | OUTPATIENT
Start: 2022-04-17 | End: 2022-04-27 | Stop reason: HOSPADM

## 2022-04-17 RX ADMIN — GABAPENTIN 300 MG: 300 CAPSULE ORAL at 15:10

## 2022-04-17 RX ADMIN — MONTELUKAST SODIUM 10 MG: 10 TABLET ORAL at 21:54

## 2022-04-17 RX ADMIN — OXYCODONE HYDROCHLORIDE 10 MG: 10 TABLET ORAL at 07:56

## 2022-04-17 RX ADMIN — METOPROLOL SUCCINATE 50 MG: 50 TABLET, EXTENDED RELEASE ORAL at 21:53

## 2022-04-17 RX ADMIN — POLYETHYLENE GLYCOL 3350 17 G: 17 POWDER, FOR SOLUTION ORAL at 07:48

## 2022-04-17 RX ADMIN — PRAVASTATIN SODIUM 20 MG: 20 TABLET ORAL at 17:18

## 2022-04-17 RX ADMIN — DEXAMETHASONE 4 MG: 4 TABLET ORAL at 21:53

## 2022-04-17 RX ADMIN — APIXABAN 5 MG: 5 TABLET, FILM COATED ORAL at 07:48

## 2022-04-17 RX ADMIN — Medication 10 ML: at 07:50

## 2022-04-17 RX ADMIN — OXYCODONE HYDROCHLORIDE 10 MG: 10 TABLET ORAL at 17:18

## 2022-04-17 RX ADMIN — GABAPENTIN 300 MG: 300 CAPSULE ORAL at 07:48

## 2022-04-17 RX ADMIN — DEXAMETHASONE 4 MG: 4 TABLET ORAL at 07:49

## 2022-04-17 RX ADMIN — Medication 10 ML: at 21:55

## 2022-04-17 RX ADMIN — LORAZEPAM 0.5 MG: 0.5 TABLET ORAL at 21:54

## 2022-04-17 RX ADMIN — METOPROLOL SUCCINATE 50 MG: 50 TABLET, EXTENDED RELEASE ORAL at 07:48

## 2022-04-17 RX ADMIN — LORAZEPAM 0.5 MG: 0.5 TABLET ORAL at 07:48

## 2022-04-17 RX ADMIN — PANTOPRAZOLE SODIUM 40 MG: 40 TABLET, DELAYED RELEASE ORAL at 06:24

## 2022-04-17 RX ADMIN — LORAZEPAM 0.5 MG: 0.5 TABLET ORAL at 17:21

## 2022-04-17 RX ADMIN — OXYCODONE HYDROCHLORIDE 10 MG: 10 TABLET ORAL at 21:53

## 2022-04-17 RX ADMIN — GABAPENTIN 300 MG: 300 CAPSULE ORAL at 21:54

## 2022-04-17 RX ADMIN — APIXABAN 5 MG: 5 TABLET, FILM COATED ORAL at 21:54

## 2022-04-17 RX ADMIN — SENNOSIDES AND DOCUSATE SODIUM 2 TABLET: 50; 8.6 TABLET ORAL at 21:53

## 2022-04-17 ASSESSMENT — PAIN DESCRIPTION - ONSET: ONSET: AWAKENED FROM SLEEP

## 2022-04-17 ASSESSMENT — PAIN DESCRIPTION - PAIN TYPE: TYPE: ACUTE PAIN;CHRONIC PAIN

## 2022-04-17 ASSESSMENT — PAIN SCALES - GENERAL
PAINLEVEL_OUTOF10: 10
PAINLEVEL_OUTOF10: 6
PAINLEVEL_OUTOF10: 8
PAINLEVEL_OUTOF10: 2
PAINLEVEL_OUTOF10: 0
PAINLEVEL_OUTOF10: 10
PAINLEVEL_OUTOF10: 0

## 2022-04-17 ASSESSMENT — PAIN DESCRIPTION - PROGRESSION: CLINICAL_PROGRESSION: NOT CHANGED

## 2022-04-17 ASSESSMENT — PAIN DESCRIPTION - DESCRIPTORS: DESCRIPTORS: ACHING;DULL;DISCOMFORT

## 2022-04-17 ASSESSMENT — PAIN DESCRIPTION - FREQUENCY: FREQUENCY: CONTINUOUS

## 2022-04-17 ASSESSMENT — PAIN - FUNCTIONAL ASSESSMENT: PAIN_FUNCTIONAL_ASSESSMENT: ACTIVITIES ARE NOT PREVENTED

## 2022-04-17 ASSESSMENT — PAIN DESCRIPTION - LOCATION: LOCATION: BACK

## 2022-04-17 NOTE — PROGRESS NOTES
Hospitalist Progress Note      PCP: KATHIE Nguyen - CNP    Date of Admission: 4/8/2022    Chief Complaint:     Hospital Course: Patient transferred from [facility] a history of metastatic breast cancer.  Also history of hypertension, COPD and DVT, chronically anticoagulated on Eliquis.  Patient has been having back pain since November with radiation down her legs.  She reported some intermittent incontinence of urine over the past month.  Diagnosed with urinary tract infection and started on Rocephin.  CT revealed extensive metastatic disease to the right humerus, ribs, liver, lymph nodes and thoracic and lumbar spines causing cord compression at T9.  Patient was transferred to [facility] for neurosurgery consultation. Placed in TLSO brace. CT chest here showing mediastinal LAD and L adrenal nodule as well as multiple bone metastasis with pathologic fracture.  MRI of the thoracic and lumbar spine done which showed extensive metastatic disease involving the spine and also pathological fracture involving T9. Neurosurgical procedure option was ruled out, patient was started on radiation therapy.   Anticoagulation was resumed     Interval summary:  The patient reports that she continues to have low back pain, she was also tearful and mention that she cries suddenly intermittently, she wishes that she could control this  Denies any fever or chills  No urine incontinence    Medications:  Reviewed    Infusion Medications    sodium chloride       Scheduled Medications    apixaban  5 mg Oral BID    polyethylene glycol  17 g Oral Daily    sennosides-docusate sodium  2 tablet Oral Nightly    gabapentin  300 mg Oral TID    dexamethasone  4 mg Oral 2 times per day    pantoprazole  40 mg Oral QAM AC    metoprolol succinate  50 mg Oral BID    montelukast  10 mg Oral Nightly    pravastatin  20 mg Oral Dinner    sodium chloride flush  10 mL IntraVENous 2 times per day    fentaNYL  1 patch TransDERmal Q72H     PRN Meds: hydrOXYzine, oxyCODONE, HYDROmorphone, diphenhydrAMINE, LORazepam, albuterol, zolpidem, sodium chloride flush, sodium chloride, promethazine **OR** ondansetron      Intake/Output Summary (Last 24 hours) at 4/17/2022 1421  Last data filed at 4/17/2022 1338  Gross per 24 hour   Intake 420 ml   Output 600 ml   Net -180 ml       Exam:    /70   Pulse 64   Temp 97.3 °F (36.3 °C) (Oral)   Resp 18   Ht 5' (1.524 m)   Wt 172 lb (78 kg)   SpO2 97%   BMI 33.59 kg/m²     HEENT: no icterus. Respiratory:  CTA, good air entry. Cardiovascular: RRR, no murmur. Abdomen: Soft, non-tender, BS noted. Musculoskeletal: No joint pains or joint swelling noted. Neurologic: awake, alert and following commands     Labs:   Recent Labs     04/15/22  0550 04/16/22  0530   WBC 8.4 7.2   HGB 10.8* 10.5*   HCT 33.4* 32.3*    282     Recent Labs     04/15/22  0550 04/16/22  0530    134   K 3.6 3.8   CL 99 97*   CO2 28 26   BUN 19 20   CREATININE 0.5 0.5   CALCIUM 9.0 9.1     No results for input(s): AST, ALT, BILIDIR, BILITOT, ALKPHOS in the last 72 hours. No results for input(s): INR in the last 72 hours. No results for input(s): Castro Jemima in the last 72 hours. Assessment/Plan:  Cord compression of the lumbar and thoracic spines secondary to metastatic disease: neurosurgery on the case. Pain control. Palliative care consulted for symptoms management.  Continue with Decadron, continue with serial neuro checks.   MRI of the T and L-spine reviewed and underwent L4 biopsy 4/16  Metastatic breast cancer: Oncology following. Patient has imagines on a disc from [facility], disc is at home.  Radiation therapy was initiated.   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 Active Hospital Problems    Diagnosis Date Noted    UTI (urinary tract infection) [N39.0] 04/09/2022    Debility [R53.81] 04/09/2022    Cord compression ([facility] [G95.20] 04/08/2022    Intractable back pain [M54.9] 04/08/2022    [facility] [C50.919, C77.3] 12/12/2012         DVT Prophylaxis: Eliquis   diet: ADULT DIET;  Regular  Code Status: Matt Fu MD

## 2022-04-17 NOTE — PROGRESS NOTES
Neurosurg progress note  VITALS:  /70   Pulse 64   Temp 97.3 °F (36.3 °C) (Oral)   Resp 18   Ht 5' (1.524 m)   Wt 172 lb (78 kg)   SpO2 97%   BMI 33.59 kg/m²   TEMPERATURE:  Current - ;  Max - Temp (24hrs), Av.4 °F (36.3 °C), Min:96.3 °F (35.7 °C), Max:98.1 °F (36.7 °C)  ;   24HR INTAKE/OUTPUT:    Intake/Output Summary (Last 24 hours) at 2022 0913  Last data filed at 2022 1730  Gross per 24 hour   Intake 360 ml   Output 500 ml   Net -140 ml     FLUORO FOR SURGICAL PROCEDURES    Result Date: 4/15/2022  EXAMINATION: SPOT FLUOROSCOPIC IMAGES 4/15/2022 8:56 am TECHNIQUE: Fluoroscopy was provided by the radiology department for procedure. Radiologist was not present during examination. FLUOROSCOPY DOSE AND TYPE OR TIME AND EXPOSURES: Fluoroscopy time equals 12.9 seconds. Total dose equals 7.46 mGy. COMPARISON: None HISTORY: ORDERING SYSTEM PROVIDED HISTORY: L4 bone biopsy TECHNOLOGIST PROVIDED HISTORY: Reason for exam:->L4 bone biopsy What reading provider will be dictating this exam?->CRC Intraprocedural imaging. FINDINGS: 1 spot images of the lumbar spine were obtained. Intraprocedural fluoroscopic spot images as above. See separate procedure report for more information.      CBC:   Lab Results   Component Value Date    WBC 7.2 2022    RBC 3.65 2022    HGB 10.5 2022    HCT 32.3 2022    MCV 88.5 2022    MCH 28.8 2022    MCHC 32.5 2022    RDW 14.6 2022     2022    MPV 9.3 2022     BMP:    Lab Results   Component Value Date     2022    K 3.8 2022    K 3.7 2022    CL 97 2022    CO2 26 2022    BUN 20 2022    LABALBU 4.0 04/10/2022    CREATININE 0.5 2022    CALCIUM 9.1 2022    GFRAA >60 2022    LABGLOM >60 2022    LABGLOM >60 2017    GLUCOSE 134 2022    GLUCOSE 178 2017      apixaban  5 mg Oral BID    polyethylene glycol  17 g Oral Daily    sennosides-docusate sodium  2 tablet Oral Nightly    gabapentin  300 mg Oral TID    dexamethasone  4 mg Oral 2 times per day    pantoprazole  40 mg Oral QAM AC    metoprolol succinate  50 mg Oral BID    montelukast  10 mg Oral Nightly    pravastatin  20 mg Oral Dinner    sodium chloride flush  10 mL IntraVENous 2 times per day    fentaNYL  1 patch TransDERmal Q72H     Awake alert no acute distress in the hospital bed supine follows complex commands moves all fours equally  Assessment:  Patient Active Problem List   Diagnosis    S/P mastectomy, bilateral    Breast cancer metastasized to axillary lymph node (HCC)    Cord compression (HCC)    Intractable back pain    UTI (urinary tract infection)    Debility     Plan: Status post percutaneous transpedicular biopsy of the L4 vertebral body doing quite well back on her Eliquis maintain neutral spine position may put her brace on sitting up in bed to be discharged from a neurosurgery standpoint follow-up with me in the office in several weeks outpatient radiation treatment to the spine will have to be arranged   Ezekiel Buenrostro MD M.D.

## 2022-04-17 NOTE — PLAN OF CARE
Problem: Skin Integrity:  Goal: Will show no infection signs and symptoms  Description: Will show no infection signs and symptoms  4/17/2022 0451 by William Alfaro RN  Outcome: Met This Shift  4/16/2022 1549 by Rosemarie Abdalla RN  Outcome: Met This Shift  Goal: Absence of new skin breakdown  Description: Absence of new skin breakdown  4/16/2022 1549 by Rosemarie Abdalla RN  Outcome: Met This Shift  Goal: Risk for impaired skin integrity will decrease  Description: Risk for impaired skin integrity will decrease  4/17/2022 0451 by William Alfaro RN  Outcome: Met This Shift  4/16/2022 1549 by Rosemarie Abdalla RN  Outcome: Met This Shift  Goal: Demonstration of wound healing without infection will improve  Description: Demonstration of wound healing without infection will improve  4/16/2022 1549 by Rosemarie Abdalla RN  Outcome: Met This Shift     Problem:  Activity:  Goal: Ability to avoid complications of mobility impairment will improve  Description: Ability to avoid complications of mobility impairment will improve  Outcome: Met This Shift     Problem: Fluid Volume:  Goal: Maintenance of adequate hydration will improve  Description: Maintenance of adequate hydration will improve  Outcome: Met This Shift     Problem: Infection - Central Venous Catheter-Associated Bloodstream Infection:  Goal: Will show no infection signs and symptoms  Description: Will show no infection signs and symptoms  4/17/2022 0451 by William Alfaro RN  Outcome: Met This Shift  4/16/2022 1549 by Rosemarie Abdalla RN  Outcome: Met This Shift

## 2022-04-18 ENCOUNTER — HOSPITAL ENCOUNTER (OUTPATIENT)
Dept: RADIATION ONCOLOGY | Age: 67
Discharge: HOME | End: 2022-04-18
Attending: RADIOLOGY
Payer: COMMERCIAL

## 2022-04-18 LAB
ALBUMIN SERPL-MCNC: 3.4 G/DL (ref 3.5–5.2)
ANION GAP SERPL CALCULATED.3IONS-SCNC: 9 MMOL/L (ref 7–16)
BASOPHILS ABSOLUTE: 0.01 E9/L (ref 0–0.2)
BASOPHILS RELATIVE PERCENT: 0.1 % (ref 0–2)
BUN BLDV-MCNC: 18 MG/DL (ref 6–23)
CALCIUM SERPL-MCNC: 8.7 MG/DL (ref 8.6–10.2)
CHLORIDE BLD-SCNC: 98 MMOL/L (ref 98–107)
CO2: 26 MMOL/L (ref 22–29)
CREAT SERPL-MCNC: 0.4 MG/DL (ref 0.5–1)
EOSINOPHILS ABSOLUTE: 0.01 E9/L (ref 0.05–0.5)
EOSINOPHILS RELATIVE PERCENT: 0.1 % (ref 0–6)
GFR AFRICAN AMERICAN: >60
GFR NON-AFRICAN AMERICAN: >60 ML/MIN/1.73
GLUCOSE BLD-MCNC: 163 MG/DL (ref 74–99)
HCT VFR BLD CALC: 32.8 % (ref 34–48)
HEMOGLOBIN: 10.2 G/DL (ref 11.5–15.5)
IMMATURE GRANULOCYTES #: 0.15 E9/L
IMMATURE GRANULOCYTES %: 1.8 % (ref 0–5)
LYMPHOCYTES ABSOLUTE: 0.75 E9/L (ref 1.5–4)
LYMPHOCYTES RELATIVE PERCENT: 9.1 % (ref 20–42)
MCH RBC QN AUTO: 27.9 PG (ref 26–35)
MCHC RBC AUTO-ENTMCNC: 31.1 % (ref 32–34.5)
MCV RBC AUTO: 89.6 FL (ref 80–99.9)
MONOCYTES ABSOLUTE: 0.46 E9/L (ref 0.1–0.95)
MONOCYTES RELATIVE PERCENT: 5.6 % (ref 2–12)
NEUTROPHILS ABSOLUTE: 6.89 E9/L (ref 1.8–7.3)
NEUTROPHILS RELATIVE PERCENT: 83.3 % (ref 43–80)
PDW BLD-RTO: 15.1 FL (ref 11.5–15)
PHOSPHORUS: 2.3 MG/DL (ref 2.5–4.5)
PLATELET # BLD: 280 E9/L (ref 130–450)
PMV BLD AUTO: 9.1 FL (ref 7–12)
POTASSIUM SERPL-SCNC: 4.1 MMOL/L (ref 3.5–5)
RBC # BLD: 3.66 E12/L (ref 3.5–5.5)
SODIUM BLD-SCNC: 133 MMOL/L (ref 132–146)
WBC # BLD: 8.3 E9/L (ref 4.5–11.5)

## 2022-04-18 PROCEDURE — 85025 COMPLETE CBC W/AUTO DIFF WBC: CPT

## 2022-04-18 PROCEDURE — 77412 RADIATION TX DELIVERY LVL 3: CPT | Performed by: RADIOLOGY

## 2022-04-18 PROCEDURE — 6360000002 HC RX W HCPCS: Performed by: NEUROLOGICAL SURGERY

## 2022-04-18 PROCEDURE — 77014 PR CT GUIDANCE PLACEMENT RAD THERAPY FIELDS: CPT | Performed by: RADIOLOGY

## 2022-04-18 PROCEDURE — 99231 SBSQ HOSP IP/OBS SF/LOW 25: CPT | Performed by: NURSE PRACTITIONER

## 2022-04-18 PROCEDURE — 36415 COLL VENOUS BLD VENIPUNCTURE: CPT

## 2022-04-18 PROCEDURE — 6370000000 HC RX 637 (ALT 250 FOR IP): Performed by: NEUROLOGICAL SURGERY

## 2022-04-18 PROCEDURE — 77387 GUIDANCE FOR RADJ TX DLVR: CPT | Performed by: RADIOLOGY

## 2022-04-18 PROCEDURE — 6370000000 HC RX 637 (ALT 250 FOR IP): Performed by: NURSE PRACTITIONER

## 2022-04-18 PROCEDURE — 6370000000 HC RX 637 (ALT 250 FOR IP): Performed by: INTERNAL MEDICINE

## 2022-04-18 PROCEDURE — 2580000003 HC RX 258: Performed by: NEUROLOGICAL SURGERY

## 2022-04-18 PROCEDURE — 1200000000 HC SEMI PRIVATE

## 2022-04-18 PROCEDURE — 80069 RENAL FUNCTION PANEL: CPT

## 2022-04-18 RX ADMIN — DEXAMETHASONE 4 MG: 4 TABLET ORAL at 08:19

## 2022-04-18 RX ADMIN — POLYETHYLENE GLYCOL 3350 17 G: 17 POWDER, FOR SOLUTION ORAL at 10:58

## 2022-04-18 RX ADMIN — HYDROXYZINE PAMOATE 25 MG: 25 CAPSULE ORAL at 11:01

## 2022-04-18 RX ADMIN — PRAVASTATIN SODIUM 20 MG: 20 TABLET ORAL at 17:39

## 2022-04-18 RX ADMIN — METOPROLOL SUCCINATE 50 MG: 50 TABLET, EXTENDED RELEASE ORAL at 08:19

## 2022-04-18 RX ADMIN — LORAZEPAM 0.5 MG: 0.5 TABLET ORAL at 21:11

## 2022-04-18 RX ADMIN — GABAPENTIN 300 MG: 300 CAPSULE ORAL at 14:09

## 2022-04-18 RX ADMIN — SENNOSIDES AND DOCUSATE SODIUM 2 TABLET: 50; 8.6 TABLET ORAL at 21:01

## 2022-04-18 RX ADMIN — APIXABAN 5 MG: 5 TABLET, FILM COATED ORAL at 08:19

## 2022-04-18 RX ADMIN — GABAPENTIN 300 MG: 300 CAPSULE ORAL at 21:01

## 2022-04-18 RX ADMIN — PANTOPRAZOLE SODIUM 40 MG: 40 TABLET, DELAYED RELEASE ORAL at 05:50

## 2022-04-18 RX ADMIN — OXYCODONE HYDROCHLORIDE 10 MG: 10 TABLET ORAL at 11:01

## 2022-04-18 RX ADMIN — DEXAMETHASONE 4 MG: 4 TABLET ORAL at 21:11

## 2022-04-18 RX ADMIN — MONTELUKAST SODIUM 10 MG: 10 TABLET ORAL at 21:01

## 2022-04-18 RX ADMIN — METOPROLOL SUCCINATE 50 MG: 50 TABLET, EXTENDED RELEASE ORAL at 21:11

## 2022-04-18 RX ADMIN — OXYCODONE HYDROCHLORIDE 10 MG: 10 TABLET ORAL at 21:00

## 2022-04-18 RX ADMIN — Medication 10 ML: at 08:18

## 2022-04-18 RX ADMIN — Medication 10 ML: at 21:02

## 2022-04-18 RX ADMIN — ZOLPIDEM TARTRATE 10 MG: 5 TABLET ORAL at 21:12

## 2022-04-18 RX ADMIN — APIXABAN 5 MG: 5 TABLET, FILM COATED ORAL at 21:01

## 2022-04-18 RX ADMIN — GABAPENTIN 300 MG: 300 CAPSULE ORAL at 08:18

## 2022-04-18 ASSESSMENT — PAIN DESCRIPTION - ONSET
ONSET: ON-GOING
ONSET: ON-GOING

## 2022-04-18 ASSESSMENT — PAIN DESCRIPTION - ORIENTATION
ORIENTATION: MID
ORIENTATION: MID

## 2022-04-18 ASSESSMENT — PAIN DESCRIPTION - LOCATION
LOCATION: BACK
LOCATION: BACK

## 2022-04-18 ASSESSMENT — PAIN DESCRIPTION - DESCRIPTORS
DESCRIPTORS: ACHING;DULL;DISCOMFORT
DESCRIPTORS: ACHING;DISCOMFORT;SORE

## 2022-04-18 ASSESSMENT — PAIN DESCRIPTION - PROGRESSION
CLINICAL_PROGRESSION: NOT CHANGED
CLINICAL_PROGRESSION: NOT CHANGED

## 2022-04-18 ASSESSMENT — PAIN SCALES - GENERAL
PAINLEVEL_OUTOF10: 5
PAINLEVEL_OUTOF10: 7
PAINLEVEL_OUTOF10: 8
PAINLEVEL_OUTOF10: 0

## 2022-04-18 ASSESSMENT — PAIN - FUNCTIONAL ASSESSMENT
PAIN_FUNCTIONAL_ASSESSMENT: PREVENTS OR INTERFERES SOME ACTIVE ACTIVITIES AND ADLS
PAIN_FUNCTIONAL_ASSESSMENT: ACTIVITIES ARE NOT PREVENTED

## 2022-04-18 ASSESSMENT — PAIN DESCRIPTION - PAIN TYPE: TYPE: ACUTE PAIN;CHRONIC PAIN

## 2022-04-18 ASSESSMENT — PAIN DESCRIPTION - FREQUENCY
FREQUENCY: CONTINUOUS
FREQUENCY: CONTINUOUS

## 2022-04-18 NOTE — PLAN OF CARE
Problem: Skin Integrity:  Goal: Will show no infection signs and symptoms  Description: Will show no infection signs and symptoms  4/18/2022 0932 by Jennie Parks RN  Outcome: Met This Shift     Problem: Skin Integrity:  Goal: Absence of new skin breakdown  Description: Absence of new skin breakdown  4/18/2022 0932 by Jennie Parks RN  Outcome: Met This Shift     Problem: Skin Integrity:  Goal: Risk for impaired skin integrity will decrease  Description: Risk for impaired skin integrity will decrease  4/18/2022 0932 by Jennie Parks RN  Outcome: Met This Shift     Problem: Skin Integrity:  Goal: Demonstration of wound healing without infection will improve  Description: Demonstration of wound healing without infection will improve  4/18/2022 0932 by Jennie Parks RN  Outcome: Met This Shift     Problem: Skin Integrity:  Goal: Complications related to intravenous access or infusion will be avoided or minimized  Description: Complications related to intravenous access or infusion will be avoided or minimized  Outcome: Met This Shift

## 2022-04-18 NOTE — PLAN OF CARE
Problem: Skin Integrity:  Goal: Will show no infection signs and symptoms  Description: Will show no infection signs and symptoms  Outcome: Met This Shift  Goal: Absence of new skin breakdown  Description: Absence of new skin breakdown  Outcome: Met This Shift     Problem:  Activity:  Goal: Ability to avoid complications of mobility impairment will improve  Description: Ability to avoid complications of mobility impairment will improve  Outcome: Met This Shift     Problem: Fluid Volume:  Goal: Maintenance of adequate hydration will improve  Description: Maintenance of adequate hydration will improve  Outcome: Met This Shift     Problem: Infection - Central Venous Catheter-Associated Bloodstream Infection:  Goal: Will show no infection signs and symptoms  Description: Will show no infection signs and symptoms  Outcome: Met This Shift

## 2022-04-18 NOTE — PLAN OF CARE
Problem: Skin Integrity:  Goal: Will show no infection signs and symptoms  Description: Will show no infection signs and symptoms  4/18/2022 0931 by Desirae Valadez RN  Outcome: Met This Shift     Problem: Skin Integrity:  Goal: Absence of new skin breakdown  Description: Absence of new skin breakdown  4/18/2022 0931 by Desirae Valadez RN  Outcome: Met This Shift     Problem: Skin Integrity:  Goal: Risk for impaired skin integrity will decrease  Description: Risk for impaired skin integrity will decrease  Outcome: Met This Shift     Problem: Skin Integrity:  Goal: Demonstration of wound healing without infection will improve  Description: Demonstration of wound healing without infection will improve  Outcome: Met This Shift     Problem: Skin Integrity:  Goal: Complications related to intravenous access or infusion will be avoided or minimized  Description: Complications related to intravenous access or infusion will be avoided or minimized  Outcome: Met This Shift

## 2022-04-18 NOTE — CARE COORDINATION
SOCIAL WORK/CASEMANAGEMENT TRANSITION OF CARE [id number] [facility] [address], [place], [phone]): met with pt after she returned from her 4/10 radiation tx's this morning. Pt said that dr. Nelson Ya was reaching out to [facility] and trying to coordinate so that when she is discharged she can go to [place] to finish her treatments. I called ext [phone] and left a vm for the radiation dept to call me back regarding this. PT to see pt. Will return with pt once seen to see if she wants home therapy. Pt said she has a very supportive family to help her. tlso brace in room. Called [facility] at [phone] fax [phone] per pt request to check on referral and they have not made a determination yet and she let a message for someone to call me today. Pt is not interested in being see by our pallative care here any more. [facility] called and they are not in network with [facility]. Called community hospice and they have a pallative care program out of [place] and I faxed a referral to lalitha Scott at [phone]  fax [phone]. RIED Cuello  ,4/18/2022   Spoke with lalitha Scott with [facility] pallative care. She will take pt and reach out once discharged. i will call her when pt goes home. She feels pt is more appropriate for hospice service due to the extent of the cancer with mets. REID Cuello  4/18/2022  pcp ok with discharge. Called the radiation dept and left another vm regarding this and to call me or the floor back. REID Cuello  4/18/2022  The radiation dept at [phone] called back and transferred me to the rn navigator who will look into this. She said dr. Nelson Ya is out of town this week. I met with pt and she said that she lives 1 hour away and cannot sit up in the car that long. She is to get ahold of her family to call dr. Bita Ni the oncologist at [place] [id number] for tentative discharge tomorrow.   REID Cuello  4/18/2022

## 2022-04-18 NOTE — FLOWSHEET NOTE
04/18/22 1539   Encounter Summary   Services provided to: Patient   Referral/Consult From: [facility] [address] [facility]   (4/18 DL)   Complexity of Encounter High   Spiritual Assessment Completed Yes   Grief and Life Adjustment   Type New Diagnosis; Adjustment to illness   Assessment Approachable;Tearful;Helplessness; Concerns with suffering   Intervention Explored feelings, thoughts, concerns; Active listening;Prayer;Nurtured hope;Explored coping resources; Discussed belief system/Muslim practices/peggy;Discussed illness/injury and it's impact;Sustaining presence/ Ministry of presence; Discussed meaning/purpose;Discussed relationship with God   Outcome Expressed gratitude;Engaged in conversation;Expressed feelings/needs/concerns;Comfort; Shared life review

## 2022-04-18 NOTE — PROGRESS NOTES
Hospitalist Progress Note      SYNOPSIS: Patient admitted on 2022 for Cord compression [facility])      SUBJECTIVE:    Patient states she feels well  Denies emesis or diarrhea  Denies shortness of breath  Denies chest pain  Temp (24hrs), Av.8 °F (36 °C), Min:96.4 °F (35.8 °C), Max:97.2 °F (36.2 °C)    DIET: ADULT DIET; Regular  CODE: Limited    Intake/Output Summary (Last 24 hours) at 2022 1758  Last data filed at 2022 1328  Gross per 24 hour   Intake 420 ml   Output 1600 ml   Net -1180 ml       OBJECTIVE: Room air      BP (!) 140/70   Pulse 71   Temp 97 °F (36.1 °C) (Temporal)   Resp 18   Ht 5' (1.524 m)   Wt 172 lb (78 kg)   SpO2 98%   BMI 33.59 kg/m²     General appearance: Not diaphoretic. Elderly   HEENT: No thrush    Respiratory: No wheezing auscultation   cardiovascular: Pulse reg audible S1-S2    Neurologic: awake, alert and attentive  ASSESSMENT/  PLAN:      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  Palliative care consulted for symptoms management.   Decadron     Metastatic breast cancer    Radiation therapy was initiated    DVT secondary to malignancy: on eliquis, restarted   Urinary tract infection:  Urine culture + Escherichia coli sensitive to current Tx, completed course of antibiotics   Hypertension: on troprol XL  COPD: c/w breathing treatment   Hyponatremia: corrected, Held hydrochlorothiazide continue to hold this as blood pressures currently in acceptable range  Hypokalemia: corrected  Anxiety: she is using Ativan as needed which helps but does not last for long time, will add Vistaril as needed  DISPOSITION: To be determined    Medications:  REVIEWED DAILY    Infusion Medications    sodium chloride       Scheduled Medications    apixaban  5 mg Oral BID    polyethylene glycol  17 g Oral Daily    sennosides-docusate sodium  2 tablet Oral Nightly    gabapentin  300 mg Oral TID    dexamethasone  4 mg Oral 2 times per day    pantoprazole  40 mg Oral QAM AC    metoprolol succinate  50 mg Oral BID    montelukast  10 mg Oral Nightly    pravastatin  20 mg Oral Dinner    sodium chloride flush  10 mL IntraVENous 2 times per day    fentaNYL  1 patch TransDERmal Q72H     PRN Meds: hydrOXYzine, oxyCODONE, HYDROmorphone, diphenhydrAMINE, LORazepam, albuterol, zolpidem, sodium chloride flush, sodium chloride, promethazine **OR** ondansetron    Labs:     Recent Labs     04/16/22  0530 04/18/22  0601   WBC 7.2 8.3   HGB 10.5* 10.2*   HCT 32.3* 32.8*    280       Recent Labs     04/16/22  0530 04/18/22  0601    133   K 3.8 4.1   CL 97* 98   CO2 26 26   BUN 20 18   CREATININE 0.5 0.4*   CALCIUM 9.1 8.7   PHOS  --  2.3*         Radiology:   +++++++++++++++++++++++++++++++++++++++++++++++++  Gabrielle Covington DO  [address]  +++++++++++++++++++++++++++++++++++++++++++++++++  NOTE: This report was transcribed using voice recognition software. Every effort was made to ensure accuracy; however, inadvertent computerized transcription errors may be present.

## 2022-04-18 NOTE — PLAN OF CARE
Problem: Skin Integrity:  Goal: Will show no infection signs and symptoms  Description: Will show no infection signs and symptoms  4/18/2022 1639 by Ronald Owen RN  Outcome: Met This Shift     Problem: Skin Integrity:  Goal: Absence of new skin breakdown  Description: Absence of new skin breakdown  4/18/2022 1639 by Ronald Owen RN  Outcome: Met This Shift     Problem: Skin Integrity:  Goal: Risk for impaired skin integrity will decrease  Description: Risk for impaired skin integrity will decrease  4/18/2022 1639 by Ronald Owen RN  Outcome: Met This Shift     Problem: Skin Integrity:  Goal: Demonstration of wound healing without infection will improve  Description: Demonstration of wound healing without infection will improve  4/18/2022 1639 by Ronald Owen RN  Outcome: Met This Shift     Problem: Skin Integrity:  Goal: Complications related to intravenous access or infusion will be avoided or minimized  Description: Complications related to intravenous access or infusion will be avoided or minimized  4/18/2022 1639 by Ronald Owen RN  Outcome: Met This Shift

## 2022-04-18 NOTE — ONCOLOGY
Patient received radiation treatment 4/10 to her T-spine. Per Dr. Bre Pettit prescription, patient received 300 cGy of radiation today.

## 2022-04-18 NOTE — PROGRESS NOTES
Palliative Care Department  [phone]  Palliative Care Progress Note  Provider KATHIE Zendejas CNP      PATIENT: Andrea Suero  : 1955  MRN: [id number]  ADMISSION DATE: 2022  7:12 PM  Referring Provider: Rico Chu MD    Palliative Medicine was consulted on hospital day 10 for assistance with Goals of care, Symptom management     HPI:     Harish Nichols is a 61 y.o. y/o female with a history of static breast cancer, anxiety, COPD, depression, diabetes mellitus, hypertension, restless leg syndrome who presented to [facility]) on 2022 as a transfer from [facility] for a neurosurgery consultation due to cord compression at T9 from extensive metastases disease to  thoracic and lumbar spine. After have been evaluated by neurosurgery, patient is not interested in surgical intervention, and off-the-shelf TLSO brace was recommended. 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 every 4 hours as needed   Pain due to neoplasm  o Fentanyl 12 mcg patch every 72 hours  o Hydromorphone 1 mg every 4 hours as needed   o gabapentin 300 mg 3 times daily  o Start oxycodone 5 -10 mg every 4 hours as needed    Palliative Care Encounter / Counseling Regarding Goals of Care  Please see detailed goals of care discussion as below   At this time, Andrea Suero, Does have capacity for medical decision-making. Capacity is time limited and situation/question specific   Outcome of goals of care meeting:  o Continue with current medical treatment  o Wishes for CODE STATUS to be changed to limited DNR CCA DNI  o Does not want aggressive surgical intervention, but is hoping for conservative treatment.     Code status Limited DNR CCA DNI   Advanced Directives: no POA or living will in [facility] NOK:    Spiritual assessment: no spiritual distress identified  Bereavement and grief: to be determined  Referrals to: none today    Thank you for the opportunity to participate in the care of Gutierrez Ferguson. Felipe Bhat, APRN - CNP  Palliative Medicine     SUBJECTIVE:     Details of Conversation:    Met with Meliton Villarreal at bedside. She states that pain has been manageable. She states that the 10 mg of oxycodone has been more beneficial.  She states that she has been anxious, that the Ativan helps but that it wears off quickly. Vistaril was started yesterday, no doses of Ativan today. Prognosis: Guarded    OBJECTIVE:     BP (!) 140/70   Pulse 71   Temp 97 °F (36.1 °C) (Temporal)   Resp 18   Ht 5' (1.524 m)   Wt 172 lb (78 kg)   SpO2 98%   BMI 33.59 kg/m²     Physical Examination:  Gen: elderly, thin, NAD, awake, alert   HEENT: normocephalic, atraumatic, PERRL, EOMI,   Neck: trachea midline, no JVD  Lungs: respirations easy and not labored,   Heart: regular rate and rhythm, distant heart tones,   Abdomen: normoactive bowel sounds, soft, non-tender  Extremities: no clubbing, cyanosis or edema, moving all extremities    Skin: warm, dry without rashes, lesions, bruising  Neuro: awake, alert, oriented x 3, follows commands, no gross neurologic deficit    Objective data reviewed: labs, images, records, medication use, vitals and chart    Time/Communication  Greater than 50% of time spent, total 15 minutes in counseling and coordination of care at the bedside regarding goals of care and symptom management. Thank you for allowing Palliative Medicine to participate in the care of Gutierrez Ferguson. Note: This report was completed using computerTriblio voiced recognition software. Every effort has been made to ensure accuracy; however, inadvertent computerized transcription errors may be present.

## 2022-04-18 NOTE — PATIENT CARE CONFERENCE
P Quality Flow/Interdisciplinary Rounds Progress Note        Quality Flow Rounds held on April 18, 2022    Disciplines Attending:  Bedside Nurse, ,  and Nursing Unit [address] Montez Lopez was admitted on 4/8/2022  7:12 PM    Anticipated Discharge Date:  Expected Discharge Date: 04/16/22    Disposition:    Umberto Score:  Umberto Scale Score: 21    Readmission Risk              Risk of Unplanned Readmission:  14           Discussed patient goal for the day, patient clinical progression, and barriers to discharge.   The following Goal(s) of the Day/Commitment(s) have been identified:  Diagnostics - Report Results and Labs - Report Results      Derek Fitzgerald RN  April 18, 2022

## 2022-04-19 ENCOUNTER — HOSPITAL ENCOUNTER (OUTPATIENT)
Dept: RADIATION ONCOLOGY | Age: 67
Discharge: HOME | End: 2022-04-19
Attending: RADIOLOGY
Payer: COMMERCIAL

## 2022-04-19 PROCEDURE — 6370000000 HC RX 637 (ALT 250 FOR IP): Performed by: NURSE PRACTITIONER

## 2022-04-19 PROCEDURE — 97530 THERAPEUTIC ACTIVITIES: CPT

## 2022-04-19 PROCEDURE — 77387 GUIDANCE FOR RADJ TX DLVR: CPT | Performed by: RADIOLOGY

## 2022-04-19 PROCEDURE — 1200000000 HC SEMI PRIVATE

## 2022-04-19 PROCEDURE — 77412 RADIATION TX DELIVERY LVL 3: CPT | Performed by: RADIOLOGY

## 2022-04-19 PROCEDURE — 77336 RADIATION PHYSICS CONSULT: CPT | Performed by: RADIOLOGY

## 2022-04-19 PROCEDURE — 6370000000 HC RX 637 (ALT 250 FOR IP): Performed by: NEUROLOGICAL SURGERY

## 2022-04-19 PROCEDURE — 2580000003 HC RX 258: Performed by: NEUROLOGICAL SURGERY

## 2022-04-19 PROCEDURE — 77014 PR CT GUIDANCE PLACEMENT RAD THERAPY FIELDS: CPT | Performed by: RADIOLOGY

## 2022-04-19 PROCEDURE — 6360000002 HC RX W HCPCS: Performed by: NURSE PRACTITIONER

## 2022-04-19 PROCEDURE — 6360000002 HC RX W HCPCS: Performed by: NEUROLOGICAL SURGERY

## 2022-04-19 PROCEDURE — 99232 SBSQ HOSP IP/OBS MODERATE 35: CPT

## 2022-04-19 PROCEDURE — 97535 SELF CARE MNGMENT TRAINING: CPT

## 2022-04-19 RX ADMIN — ZOLPIDEM TARTRATE 10 MG: 5 TABLET ORAL at 22:52

## 2022-04-19 RX ADMIN — Medication 10 ML: at 20:02

## 2022-04-19 RX ADMIN — DEXAMETHASONE 4 MG: 4 TABLET ORAL at 20:03

## 2022-04-19 RX ADMIN — PRAVASTATIN SODIUM 20 MG: 20 TABLET ORAL at 15:52

## 2022-04-19 RX ADMIN — METOPROLOL SUCCINATE 50 MG: 50 TABLET, EXTENDED RELEASE ORAL at 11:42

## 2022-04-19 RX ADMIN — GABAPENTIN 300 MG: 300 CAPSULE ORAL at 11:42

## 2022-04-19 RX ADMIN — Medication 10 ML: at 22:52

## 2022-04-19 RX ADMIN — APIXABAN 5 MG: 5 TABLET, FILM COATED ORAL at 11:42

## 2022-04-19 RX ADMIN — METOPROLOL SUCCINATE 50 MG: 50 TABLET, EXTENDED RELEASE ORAL at 20:03

## 2022-04-19 RX ADMIN — APIXABAN 5 MG: 5 TABLET, FILM COATED ORAL at 20:02

## 2022-04-19 RX ADMIN — HYDROMORPHONE HYDROCHLORIDE 1 MG: 1 INJECTION, SOLUTION INTRAMUSCULAR; INTRAVENOUS; SUBCUTANEOUS at 22:52

## 2022-04-19 RX ADMIN — DEXAMETHASONE 4 MG: 4 TABLET ORAL at 11:42

## 2022-04-19 RX ADMIN — OXYCODONE HYDROCHLORIDE 10 MG: 10 TABLET ORAL at 16:07

## 2022-04-19 RX ADMIN — GABAPENTIN 300 MG: 300 CAPSULE ORAL at 15:52

## 2022-04-19 RX ADMIN — OXYCODONE HYDROCHLORIDE 10 MG: 10 TABLET ORAL at 06:38

## 2022-04-19 RX ADMIN — OXYCODONE HYDROCHLORIDE 10 MG: 10 TABLET ORAL at 20:02

## 2022-04-19 RX ADMIN — PANTOPRAZOLE SODIUM 40 MG: 40 TABLET, DELAYED RELEASE ORAL at 06:38

## 2022-04-19 RX ADMIN — SENNOSIDES AND DOCUSATE SODIUM 2 TABLET: 50; 8.6 TABLET ORAL at 20:02

## 2022-04-19 RX ADMIN — MONTELUKAST SODIUM 10 MG: 10 TABLET ORAL at 20:02

## 2022-04-19 RX ADMIN — GABAPENTIN 300 MG: 300 CAPSULE ORAL at 20:02

## 2022-04-19 ASSESSMENT — PAIN SCALES - GENERAL
PAINLEVEL_OUTOF10: 8
PAINLEVEL_OUTOF10: 9
PAINLEVEL_OUTOF10: 7
PAINLEVEL_OUTOF10: 2
PAINLEVEL_OUTOF10: 7
PAINLEVEL_OUTOF10: 4

## 2022-04-19 ASSESSMENT — PAIN DESCRIPTION - ORIENTATION
ORIENTATION: MID

## 2022-04-19 ASSESSMENT — PAIN DESCRIPTION - FREQUENCY
FREQUENCY: CONTINUOUS

## 2022-04-19 ASSESSMENT — PAIN DESCRIPTION - PAIN TYPE
TYPE: CHRONIC PAIN

## 2022-04-19 ASSESSMENT — PAIN DESCRIPTION - DESCRIPTORS
DESCRIPTORS: ACHING;DISCOMFORT;SORE
DESCRIPTORS: ACHING;BURNING;DISCOMFORT
DESCRIPTORS: ACHING;DISCOMFORT;SORE
DESCRIPTORS: ACHING;CONSTANT;DISCOMFORT
DESCRIPTORS: ACHING;CRAMPING;DISCOMFORT

## 2022-04-19 ASSESSMENT — PAIN DESCRIPTION - LOCATION
LOCATION: BACK

## 2022-04-19 ASSESSMENT — PAIN DESCRIPTION - ONSET
ONSET: ON-GOING

## 2022-04-19 ASSESSMENT — PAIN DESCRIPTION - PROGRESSION
CLINICAL_PROGRESSION: NOT CHANGED

## 2022-04-19 NOTE — CARE COORDINATION
SOCIAL WORK/CASEMANAGEMENT TRANSITION OF CARE [id number] [facility], [address], [place], [phone]): spoke with Johnny Zhao, daughter, [phone] late yesterday. Went over discharge plan. She called dr. Johny Sanchez office yesterday and he is out of town as well as dr. Dana Jimenez here. I told her we will look into seeing if we can get radiation setup at [address] so that pt can go home. We discussed that community pallative care would see pt once discharged home but they felt pt was more hospice. asher said they could persue hospice but wanted to start out with this way first. She has dme coming to the home today. So no dme is needed when pt leaves here. Pt is open to [facility] for therapy but we were waiting for PT eval. I provided pt with [facility] list. I called and spoke with ahsan from radiation oncology here and she said that dr. Ratna Galindo is a medical oncologist at [place] [address] not a radiation oncologist. For pt to go to [place] for radiation it would set her back a few weeks. She would need a new rad. Oncology referral and plan with all that goes with it to go to [place]. Pt will have to remain here until tx's are done. Updated sw/cm on floor. Pt was transferred to 5400 last night.    Heather Albarado, REID  4/19/2022

## 2022-04-19 NOTE — PROGRESS NOTES
Palliative Care Department  [phone]  Palliative Care Progress Note  Provider KATHIE Gibbs - CNP      PATIENT: Justa Yoon  : 1955  MRN: [id number]  ADMISSION DATE: 2022  7:12 PM  Referring Provider: Tom Bateman MD    Palliative Medicine was consulted on hospital day 11 for assistance with Goals of care, Symptom management     HPI:     Kell Coles is a 61 y.o. y/o female with a history of static breast cancer, anxiety, COPD, depression, diabetes mellitus, hypertension, restless leg syndrome who presented to [facility]) on 2022 as a transfer from [facility] for a neurosurgery consultation due to cord compression at T9 from extensive metastases disease to  thoracic and lumbar spine. After have been evaluated by neurosurgery, patient is not interested in surgical intervention, and off-the-shelf TLSO brace was recommended. 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 every 4 hours as needed  o Hydroxyzine 25 mg 3 times daily as needed   Pain due to neoplasm  o Fentanyl 12 mcg patch every 72 hours  o Hydromorphone 1 mg every 4 hours as needed   o gabapentin 300 mg 3 times daily  o Start oxycodone 5 -10 mg every 4 hours as needed    Palliative Care Encounter / Counseling Regarding Goals of Care  Please see detailed goals of care discussion as below   At this time, Justa Yoon, Does have capacity for medical decision-making. Capacity is time limited and situation/question specific   Outcome of goals of care meeting:  o Continue with current medical treatment  o Wishes for CODE STATUS to be changed to limited DNR CCA DNI  o Does not want aggressive surgical intervention, but is hoping for conservative treatment.     Code status Limited DNR CCA DNI   Advanced Directives: no POA or living will in epic   Surrogate/Legal NOK:    Spiritual assessment: no spiritual distress identified  Bereavement and grief: to be determined  Referrals to: none today    Thank you for the opportunity to participate in the care of Gutierrez Ferguson. Kim Ochoa, KATHIE - CNP  Palliative Medicine     SUBJECTIVE:     Details of Conversation:    Saw patient at the bedside. She reported her pain has been well controlled, has received 3 doses of oxycodone in the last 24 hours. She is still feeling anxious, she passed as needed Ativan and Vistaril, has utilized 1 dose of Vistaril and 1 dose of the Ativan in the last 24 hours, I advised her to take them as needed when she feels anxious. She voiced no new concerns. We will reassess her needs in the morning. Prognosis: Guarded    OBJECTIVE:     /76   Pulse 65   Temp 97.6 °F (36.4 °C) (Temporal)   Resp 16   Ht 5' (1.524 m)   Wt 172 lb (78 kg)   SpO2 99%   BMI 33.59 kg/m²     Physical Examination:  Gen: elderly, thin, NAD, awake, alert   HEENT: normocephalic, atraumatic, PERRL, EOMI,   Neck: trachea midline, no JVD  Lungs: respirations easy and not labored,   Heart: regular rate and rhythm, distant heart tones,   Abdomen: normoactive bowel sounds, soft, non-tender  Extremities: no clubbing, cyanosis or edema, moving all extremities    Skin: warm, dry without rashes, lesions, bruising  Neuro: awake, alert, oriented x 3, follows commands, no gross neurologic deficit    Objective data reviewed: labs, images, records, medication use, vitals and chart    Time/Communication  Greater than 50% of time spent, total 15 minutes in counseling and coordination of care at the bedside regarding goals of care and symptom management. Thank you for allowing Palliative Medicine to participate in the care of Gutierrez Ferguson. Note: This report was completed using computerZebra Mobile voiced recognition software.   Every effort has been made to ensure accuracy; however, inadvertent computerized transcription errors may be present.

## 2022-04-19 NOTE — PROGRESS NOTES
Physical Therapy  Physical Therapy Treatment    Name: Myra Vale  : 1955  MRN: [id number]      Date of Service: 2022    Evaluating PT:  Tressa Wakefield PT [id number]      Room #:  5494/6951-C  Diagnosis:  Cord compression (HCC) [G95.20]  Intractable back pain [M54.9]  PMHx/PSHx:     has a past medical history of Anxiety, Arthritis, Asthma, Cancer ([facility] COPD (chronic obstructive pulmonary disease) ([facility] Depression, Diabetes mellitus ([facility] FHx: chemotherapy, Hx of blood clots, Hyperlipidemia, Hypertension, PONV (postoperative nausea and vomiting), Radiation, Reflux, and Restless leg syndrome. has a past surgical history that includes Hysterectomy (); Abdomen surgery; Carpal tunnel release; Cholecystectomy; toenail excision; Foot surgery; Mohs surgery (); joint replacement (); Breast surgery (); Breast surgery (); vascular surgery; Tunneled venous port placement; Breast reconstruction (Bilateral, 10/23/2015 ); and Spine surgery (N/A, 4/15/2022). Procedure/Surgery:  noen  Precautions:  Falls,  FWB (full weight bearing) , Soft TLSO -- sit at EOB to donne  Equipment Needs: Romie Houston,    SUBJECTIVE:    Patient lives with family with children who can assist  in a ranch home  with 3 + 3 steps to enter with Rail  Bed is on 1 floor and bath is on 1 floor. Patient ambulated independently  PTA. Equipment owned: [place],      OBJECTIVE:   Initial Evaluation  Date: 4/10/22 Treatment  22 Short Term/ Long Term   Goals   AM-PAC 6 Clicks 71/52 70/46    Was pt agreeable to Eval/treatment? yes yes    Does pt have pain? 8/10 5/10 spine pain at rest    Bed Mobility  Rolling: Min   Supine to sit:   Min   Sit to supine: Min   Scooting: Min fww Rolling: Melisa   Supine to sit: Melisa   Sit to supine: Melisa  Scooting: Melisa  Rolling: Ind  Supine to sit:  Ind  Sit to supine: Ind  Scooting: Ind   Transfers Sit to stand: Min to fww  Stand to sit: Min  Stand pivot: Min with fww Sit to stand: Melisa  Stand to sit: Melisa  Stand pivot: Min with [employer] Sit to stand: Mod Ind  to fww  Stand to sit: Mod Ind    Stand pivot: Mod Ind  with fww   Ambulation    30 feet with fww  feet x2 with [employer] Melisa 100 feet with Mod Ind  fww   Stair negotiation: ascended and descended  NT 4 stairs with 2 rails Melisa 3+3 steps with Min    ROM BUE:  wfl   BLE:  wfl     Strength BUE: wfl   RLE:  Grossly 4+/5  LLE:  Grossly  4+/5  5/5   Balance Sitting EOB:  Ind  Dynamic Standing:  Min Sitting EOB:  Ind  Dynamic Standing:  Melisa with [employer] Sitting EOB:  Ind  Dynamic Standing: Mod Ind     Pt is A & O x 4  Sensation:  WNL  Edema:  None noted    Patient education  Pt educated on role of PT, spinal precautions, TLSO application at EOB    Patient response to education:   Pt verbalized understanding Pt demonstrated skill Pt requires further education in this area   yes With cues yes     ASSESSMENT:    Comments:  Patient semi-supine in bed upon entry and agreeable to PT treatment. Patient educated on spinal precautions and log roll technique for bed mobility. Assist required for bed mobility to maintain precautions. TLSO applied at EOB prior to further mobility. Patient able to stand and ambulate into lau with [employer]. Set of 4 stairs completed with 2 rails and light assist for safety. Mild SOB with activity that resolved with seated rest. Patient left sitting in chair at end of session with all needs met. Treatment:  Patient practiced and was instructed in the following treatment:     Bed Mobility: VCs provided for sequencing and safety during mobility. Manual assist provided for completion of task.  Transfer Training: Verbal and tactile cueing provided for sequencing and safety during mobility. Manual assist provided for completion of task   Gait Training: Ambulation with [employer] and verbal cues for proper technique and safety.  Manual assist provided for completion of task    Patient Education: Education provided on spinal precautions and TLSO application    PLAN:    Patient is making good progress towards established goals. Will continue with current POC.       Time in  1355  Time out  1433    Total Treatment Time  38 minutes     CPT codes:  [] Gait training 01891 - minutes  [] Manual therapy 84783 - minutes  [x] Therapeutic activities 15170 38 minutes  [] Therapeutic exercises 50965 - minutes  [] Neuromuscular reeducation 55139 - minutes    Marilu Eugene PT, DPT  [id number]

## 2022-04-19 NOTE — PROGRESS NOTES
Hospitalist Progress Note      SYNOPSIS: Patient admitted on 2022 for Cord compression [facility])      SUBJECTIVE:    Patient states she feels well  Denies emesis or diarrhea  Denies shortness of breath  Denies chest pain  Temp (24hrs), Av.3 °F (36.3 °C), Min:97 °F (36.1 °C), Max:97.6 °F (36.4 °C)    DIET: ADULT DIET; Regular  CODE: Limited    Intake/Output Summary (Last 24 hours) at 2022 1121  Last data filed at 2022 0639  Gross per 24 hour   Intake 480 ml   Output 1550 ml   Net -1070 ml       OBJECTIVE: Room air  99% SPO2    /76   Pulse 65   Temp 97.6 °F (36.4 °C) (Temporal)   Resp 16   Ht 5' (1.524 m)   Wt 172 lb (78 kg)   SpO2 99%   BMI 33.59 kg/m²     General appearance: Not diaphoretic. Elderly   HEENT: No thrush    Respiratory: No wheezing auscultation   cardiovascular: Pulse reg audible S1-S2    Neurologic: awake, alert and attentive  ASSESSMENT/  PLAN:      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  Palliative care consulted for symptoms management.   Decadron     Metastatic breast cancer    Radiation therapy was initiated    DVT secondary to malignancy: on eliquis, restarted   Urinary tract infection:  Urine culture + Escherichia coli sensitive to current Tx, completed course of antibiotics   Hypertension: on troprol XL  COPD: c/w breathing treatment   Hyponatremia: corrected, Held hydrochlorothiazide continue to hold this as blood pressures currently in acceptable range  Hypokalemia: corrected  Anxiety: she is using Ativan as needed which helps but does not last for long time, will add Vistaril as needed  DISPOSITION: To be determined    Medications:  REVIEWED DAILY    Infusion Medications    sodium chloride       Scheduled Medications    apixaban  5 mg Oral BID    polyethylene glycol  17 g Oral Daily    sennosides-docusate sodium  2 tablet Oral Nightly    gabapentin  300 mg Oral TID    dexamethasone  4 mg Oral 2 times per day    pantoprazole  40 mg Oral QAM AC    metoprolol succinate  50 mg Oral BID    montelukast  10 mg Oral Nightly    pravastatin  20 mg Oral Dinner    sodium chloride flush  10 mL IntraVENous 2 times per day    fentaNYL  1 patch TransDERmal Q72H     PRN Meds: hydrOXYzine, oxyCODONE, HYDROmorphone, diphenhydrAMINE, LORazepam, albuterol, zolpidem, sodium chloride flush, sodium chloride, promethazine **OR** ondansetron    Labs:     Recent Labs     04/18/22  0601   WBC 8.3   HGB 10.2*   HCT 32.8*          Recent Labs     04/18/22  0601      K 4.1   CL 98   CO2 26   BUN 18   CREATININE 0.4*   CALCIUM 8.7   PHOS 2.3*         Radiology:   +++++++++++++++++++++++++++++++++++++++++++++++++  Izzy Wilkinson DO  [address]  +++++++++++++++++++++++++++++++++++++++++++++++++  NOTE: This report was transcribed using voice recognition software. Every effort was made to ensure accuracy; however, inadvertent computerized transcription errors may be present.

## 2022-04-19 NOTE — PROGRESS NOTES
Occupational Therapy  OT BEDSIDE TREATMENT NOTE   [address] [place], [place]        Date:2022  Patient Name: Unique Garcia  MRN: [id number]  : 1955  Room: [address] [place] [address]     Per OT Eval:    Evaluating OT: CHARLES Gonzales/DELANO, [id number]     Referring [facility], DO  Specific Provider Orders/Date: OT eval and treat 22     Diagnosis: Cord compression ([facility] [G95.20]  Intractable back pain [M54.9]   Surgery: NA     Pertinent Medical History:      Past Medical History        Past Medical History:   Diagnosis Date    Anxiety       pt anxious over surgery takes xanax prn    Arthritis       osteo    Asthma      Cancer ([facility]      right breast 2012 left breast    COPD (chronic obstructive pulmonary disease) ([facility]       stable per pt no sob    Depression       stable for diagnosis    Diabetes mellitus ([facility]       stable per pt    FHx: chemotherapy 2000    Hx of blood clots       left leg, bilat lung PE, 2014    Hyperlipidemia      Hypertension       stable per pt    PONV (postoperative nausea and vomiting)      Radiation 2000     hx of    Reflux      Restless leg syndrome              Past Surgical History         Past Surgical History:   Procedure Laterality Date    ABDOMEN SURGERY         colon resection    BREAST RECONSTRUCTION Bilateral 10/23/2015      stage I    BREAST SURGERY        partial right breast mastectomy     BREAST SURGERY        left breast mastectomy    CARPAL TUNNEL RELEASE         right    CHOLECYSTECTOMY        FOOT SURGERY        HYSTERECTOMY   2001    JOINT REPLACEMENT        right total hip    MOHS SURGERY   2011    TOENAIL EXCISION         bilat    TUNNELED VENOUS PORT PLACEMENT         right chest, 2012    VASCULAR SURGERY         stent placed left leg d/t blood clots          Precautions:  Fall Risk, soft TLSO, full weight bearing     Assessment of current deficits    [x]? Functional mobility          [x]? ADLs           [x]? Strength                  []?Cognition    [x]? Functional transfers        [x]? IADLs         []? Safety Awareness   [x]? Endurance    []? Fine Coordination                        []? Balance      []? Vision/perception   []? Sensation      []? Gross Motor Coordination            []? ROM           []?  Delirium                   []? Motor Control      OT PLAN OF CARE   OT POC based on physician orders, patient diagnosis and results of clinical assessment     Frequency/Duration 1-3 days/wk for 2 weeks PRN   Specific OT Treatment Interventions to include:   * Instruction/training on adapted ADL techniques and AE recommendations to increase functional independence within precautions       * Training on energy conservation strategies, correct breathing pattern and techniques to improve independence/tolerance for self-care routine  * Functional transfer/mobility training/DME recommendations for increased independence, safety, and fall prevention  * Patient/Family education to increase follow through with safety techniques and functional independence  * Recommendation of environmental modifications for increased safety with functional transfers/mobility and ADLs  * Splinting/positioning for increased function, prevention of contractures, and improve skin integrity  * Therapeutic exercise to improve motor endurance, ROM, and functional strength for ADLs/functional transfers  * Therapeutic activities to facilitate/challenge dynamic balance, stand tolerance for increased safety and independence with ADLs  * Therapeutic activities to facilitate gross/fine motor skills for increased independence with ADLs  * Positioning to improve skin integrity, interaction with environment and functional independence  * Delirium prevention/treatment     Recommended Adaptive Equipment: front wheeled walker, leg *, long handled sponge*, reacher*, Lee Scalesissued 4/10)     Comments: Based on patient's functional performance as stated below and level of assistance needed prior to admission, this therapist believes that the patient would benefit from further skilled OT following hospital stay in an effort to increase safety, functional independence, and quality of life.     Home Living: Pt lives with son in a 1 story home with 3 steps down and 3 steps up to enter and no rail(s); bed/bath on main floor  Bathroom setup: tub shower, [address] [place] [address], ; standard toilet  Equipment owned: cane, sw, shower chair     Prior Level of Function: some assistance with ADLs and with IADLs; using sw vs cane for ambulation. Driving: yes  Occupation: retired      Pain Level: Pt complained of minimal back pain this session  Cognition: A&O: 4/4; Follows 3 step directions              Memory: G              Sequencing: G              Problem solving: G              Judgement/safety: G                Functional Assessment: AM-PAC Daily Activity Raw Score: 17/24    Initial Eval Status  Date: 4/10/22 Treatment Status  Date: 4/19/22 STGs = LTGs  Time frame: 10-14 days   Feeding Set up A  Setup      Grooming Set up A  seated  Setup  Pt washed face, applied deodorant, combed hair seated EOB    supervision while seated/standing at sink    UB Dressing Mod A  To clint brace in supine; educated regarding spinal neutrality  maxA  Pt requiring assistance to clint TLSO brace seated EOB, assistance to place brace around back, fasten and tighten    Min A   LB Dressing Mod A  To clint underpants in supine. Educated re adaptive equipment, no interest in sock aide or shoe horn  Staci  Pt requiring assistacen to thread brief while supine in bed, using of bridging technique to pull over hips.  Pt donned socks bringing of foot to EOB Min A   Bathing Mod A  Simulated; demo'd use of leg  for shower transfers  Staci  Simulated Task    Pt able to wash of UB, stating of having long cain sponge to wash of LE's, with assistance to stand and wash of buttocks/ray area for safety Min A   Toileting Min A  simulated [address] task on standard commode, pt tatiana to manage of clothes and complete of hygiene to ray area, assistance to transfer with use of grab bar    Per pt, using of bedside commode and standard commode at home Supervision   Bed Mobility  Rolling: SBA  Supine to sit: MIn A  Sit to supine: NT Staci  Supine<>EOB    Log roll technique      Functional Transfers Sit to stand: Min A  Stand to sit: Min A Staci  Sit to Stand  Stand to Sit    Cueing for hand placement  Supervision   Functional Mobility Min A ww  For in-room distance  Staci  Pt ambulated short household distance in room and within lau with w.w., cueing to maintain of spinal precautions/no twisting Mod I for in-home distances   Balance Sitting:     Static:  supervision    Dynamic: Supervision  Standing: CGA ww  Sitting:  Independent    Standing:  Staci     Endurance/Activity Tolerance Fair-  Fair- Good-   Visual/  Perceptual Glasses: yes, not present in room                    Hand Dominance R    AROM (PROM) Strength Additional Info:    RUE  WFL WFL good  and wfl FMC/dexterity noted during ADL tasks   LUE WFL WFL good  and wfl FMC/dexterity noted during ADL tasks      Hearing: WFL  Sensation:  No c/o numbness or tingling  Tone: WNL  Edema: unremarkable          Education:  Pt was educated on role of OT, goals to be reached, importance of OOB activity, precautions to follow, log roll technique with bed mobility, safety and hand placement with transfers, safety and walker management with functional mobility, and techniques to assist with LB dressing/bathing tasks. Comments: Upon arrival to see pt at first attempt, pt supine in bed, requesting to return when daughter is present due to pt and daughter/family having a specific way of doing ADL tasks, and having help at home from family as needed with ADL tasks.  Pt explained the role of OT is to make sure pt could be as independent as possible with ADL tasks, in which if pt was going to rely on family to complete ADL tasks at time of discharge, OT would not then be therapeutic at this time, with pt becoming agitated. Upon arrival to see pt at second attempt, PT present and pt now agreeable to therapy. Pt compelted of bed mobility, functional mobility, transfers and ADL tasks this session. Pt able to complete ADL tasks with Staci, cueing to maintain of precautions with all tasks throughout session. Pt stating of having AE and DME needed at home to complete ADL tasks. Pt becoming apologetic throughout session, being appreciative at end of session for therapy. At end of session, pt then seated upright in chair, all tubes and lines intact, call light within reach. Rest breaks provided throughout session as needed. · Pt has made fair progress towards set goals.    · Continue with current plan of care focusing on increasing of independency with transfers and ADL tasks      Treatment Time In: 1:55pm           Treatment Time Out: 2:33pm               Treatment Charges: Mins Units   Ther Ex  16183     Manual Therapy [id number]     Thera Activities [id number] 23 2   ADL/Home Mgt [id number] 15 1   Neuro Re-ed [id number]     Group Therapy      Orthotic manage/training  38706     Non-Billable Time     Total Timed Treatment 38 3        Connie Brewer BRAY/L [id number]

## 2022-04-20 ENCOUNTER — HOSPITAL ENCOUNTER (OUTPATIENT)
Dept: RADIATION ONCOLOGY | Age: 67
Discharge: HOME | End: 2022-04-20

## 2022-04-20 ENCOUNTER — HOSPITAL ENCOUNTER (OUTPATIENT)
Dept: RADIATION ONCOLOGY | Age: 67
Discharge: HOME | End: 2022-04-20
Attending: RADIOLOGY
Payer: COMMERCIAL

## 2022-04-20 PROCEDURE — 2580000003 HC RX 258: Performed by: NEUROLOGICAL SURGERY

## 2022-04-20 PROCEDURE — 6370000000 HC RX 637 (ALT 250 FOR IP): Performed by: NURSE PRACTITIONER

## 2022-04-20 PROCEDURE — 99231 SBSQ HOSP IP/OBS SF/LOW 25: CPT | Performed by: NURSE PRACTITIONER

## 2022-04-20 PROCEDURE — 1200000000 HC SEMI PRIVATE

## 2022-04-20 PROCEDURE — 6360000002 HC RX W HCPCS: Performed by: NEUROLOGICAL SURGERY

## 2022-04-20 PROCEDURE — 6370000000 HC RX 637 (ALT 250 FOR IP): Performed by: NEUROLOGICAL SURGERY

## 2022-04-20 PROCEDURE — 6370000000 HC RX 637 (ALT 250 FOR IP): Performed by: INTERNAL MEDICINE

## 2022-04-20 PROCEDURE — 77014 PR CT GUIDANCE PLACEMENT RAD THERAPY FIELDS: CPT | Performed by: RADIOLOGY

## 2022-04-20 PROCEDURE — 77412 RADIATION TX DELIVERY LVL 3: CPT | Performed by: RADIOLOGY

## 2022-04-20 PROCEDURE — 77427 RADIATION TX MANAGEMENT X5: CPT | Performed by: RADIOLOGY

## 2022-04-20 PROCEDURE — 77387 GUIDANCE FOR RADJ TX DLVR: CPT | Performed by: RADIOLOGY

## 2022-04-20 RX ORDER — CHLORHEXIDINE GLUCONATE 0.12 MG/ML
15 RINSE ORAL 2 TIMES DAILY
Status: DISCONTINUED | OUTPATIENT
Start: 2022-04-20 | End: 2022-04-27 | Stop reason: HOSPADM

## 2022-04-20 RX ORDER — FENTANYL 25 UG/H
1 PATCH TRANSDERMAL
Status: DISCONTINUED | OUTPATIENT
Start: 2022-04-20 | End: 2022-04-27 | Stop reason: HOSPADM

## 2022-04-20 RX ORDER — DIMETHICONE, OXYBENZONE, AND PADIMATE O 2; 2.5; 6.6 G/100G; G/100G; G/100G
STICK TOPICAL PRN
Status: DISCONTINUED | OUTPATIENT
Start: 2022-04-20 | End: 2022-04-27 | Stop reason: HOSPADM

## 2022-04-20 RX ORDER — OXYCODONE HYDROCHLORIDE 15 MG/1
15 TABLET ORAL EVERY 4 HOURS PRN
Status: DISCONTINUED | OUTPATIENT
Start: 2022-04-20 | End: 2022-04-27 | Stop reason: HOSPADM

## 2022-04-20 RX ORDER — CELECOXIB 100 MG/1
100 CAPSULE ORAL 2 TIMES DAILY
Status: DISCONTINUED | OUTPATIENT
Start: 2022-04-20 | End: 2022-04-27 | Stop reason: HOSPADM

## 2022-04-20 RX ADMIN — CELECOXIB 100 MG: 100 CAPSULE ORAL at 21:17

## 2022-04-20 RX ADMIN — GABAPENTIN 300 MG: 300 CAPSULE ORAL at 21:17

## 2022-04-20 RX ADMIN — OXYCODONE HYDROCHLORIDE 10 MG: 10 TABLET ORAL at 06:25

## 2022-04-20 RX ADMIN — PRAVASTATIN SODIUM 20 MG: 20 TABLET ORAL at 18:50

## 2022-04-20 RX ADMIN — Medication 10 ML: at 08:46

## 2022-04-20 RX ADMIN — DEXAMETHASONE 4 MG: 4 TABLET ORAL at 21:17

## 2022-04-20 RX ADMIN — GABAPENTIN 300 MG: 300 CAPSULE ORAL at 13:54

## 2022-04-20 RX ADMIN — 0.12% CHLORHEXIDINE GLUCONATE 15 ML: 1.2 RINSE ORAL at 18:50

## 2022-04-20 RX ADMIN — SENNOSIDES AND DOCUSATE SODIUM 2 TABLET: 50; 8.6 TABLET ORAL at 21:17

## 2022-04-20 RX ADMIN — APIXABAN 5 MG: 5 TABLET, FILM COATED ORAL at 08:46

## 2022-04-20 RX ADMIN — CELECOXIB 100 MG: 100 CAPSULE ORAL at 18:50

## 2022-04-20 RX ADMIN — GABAPENTIN 300 MG: 300 CAPSULE ORAL at 08:46

## 2022-04-20 RX ADMIN — MONTELUKAST SODIUM 10 MG: 10 TABLET ORAL at 21:17

## 2022-04-20 RX ADMIN — PANTOPRAZOLE SODIUM 40 MG: 40 TABLET, DELAYED RELEASE ORAL at 06:25

## 2022-04-20 RX ADMIN — Medication 10 ML: at 23:06

## 2022-04-20 RX ADMIN — 0.12% CHLORHEXIDINE GLUCONATE 15 ML: 1.2 RINSE ORAL at 21:17

## 2022-04-20 RX ADMIN — METOPROLOL SUCCINATE 50 MG: 50 TABLET, EXTENDED RELEASE ORAL at 08:46

## 2022-04-20 RX ADMIN — METOPROLOL SUCCINATE 50 MG: 50 TABLET, EXTENDED RELEASE ORAL at 21:17

## 2022-04-20 RX ADMIN — DEXAMETHASONE 4 MG: 4 TABLET ORAL at 08:47

## 2022-04-20 RX ADMIN — APIXABAN 5 MG: 5 TABLET, FILM COATED ORAL at 21:17

## 2022-04-20 RX ADMIN — OXYCODONE HYDROCHLORIDE 15 MG: 15 TABLET ORAL at 21:17

## 2022-04-20 ASSESSMENT — PAIN - FUNCTIONAL ASSESSMENT
PAIN_FUNCTIONAL_ASSESSMENT: ACTIVITIES ARE NOT PREVENTED
PAIN_FUNCTIONAL_ASSESSMENT: ACTIVITIES ARE NOT PREVENTED
PAIN_FUNCTIONAL_ASSESSMENT: PREVENTS OR INTERFERES SOME ACTIVE ACTIVITIES AND ADLS

## 2022-04-20 ASSESSMENT — PAIN DESCRIPTION - ORIENTATION
ORIENTATION: MID

## 2022-04-20 ASSESSMENT — PAIN DESCRIPTION - LOCATION
LOCATION: GENERALIZED
LOCATION: BACK

## 2022-04-20 ASSESSMENT — PAIN SCALES - GENERAL
PAINLEVEL_OUTOF10: 4
PAINLEVEL_OUTOF10: 7
PAINLEVEL_OUTOF10: 9
PAINLEVEL_OUTOF10: 7
PAINLEVEL_OUTOF10: 7

## 2022-04-20 ASSESSMENT — PAIN DESCRIPTION - DESCRIPTORS
DESCRIPTORS: ACHING;DISCOMFORT;BURNING
DESCRIPTORS: ACHING;DISCOMFORT
DESCRIPTORS: ACHING;CONSTANT;SORE
DESCRIPTORS: ACHING;DISCOMFORT;DULL

## 2022-04-20 ASSESSMENT — PAIN SCALES - WONG BAKER: WONGBAKER_NUMERICALRESPONSE: 8

## 2022-04-20 ASSESSMENT — PAIN DESCRIPTION - PAIN TYPE: TYPE: CHRONIC PAIN

## 2022-04-20 NOTE — PROGRESS NOTES
Neurosurg progress note  VITALS:  BP (!) 155/98   Pulse 77   Temp 97.5 °F (36.4 °C) (Temporal)   Resp 16   Ht 5' (1.524 m)   Wt 172 lb (78 kg)   SpO2 100%   BMI 33.59 kg/m²   24HR INTAKE/OUTPUT:    Intake/Output Summary (Last 24 hours) at 4/20/2022 0819  Last data filed at 4/20/2022 3199  Gross per 24 hour   Intake 860 ml   Output 1000 ml   Net -140 ml     No results found.   CBC:   Lab Results   Component Value Date    WBC 8.3 04/18/2022    RBC 3.66 04/18/2022    HGB 10.2 04/18/2022    HCT 32.8 04/18/2022    MCV 89.6 04/18/2022    MCH 27.9 04/18/2022    MCHC 31.1 04/18/2022    RDW 15.1 04/18/2022     04/18/2022    MPV 9.1 04/18/2022     BMP:    Lab Results   Component Value Date     04/18/2022    K 4.1 04/18/2022    K 3.7 04/14/2022    CL 98 04/18/2022    CO2 26 04/18/2022    BUN 18 04/18/2022    LABALBU 3.4 04/18/2022    CREATININE 0.4 04/18/2022    CALCIUM 8.7 04/18/2022    GFRAA >60 04/18/2022    LABGLOM >60 04/18/2022    LABGLOM >60 05/30/2017    GLUCOSE 163 04/18/2022    GLUCOSE 178 05/30/2017      apixaban  5 mg Oral BID    polyethylene glycol  17 g Oral Daily    sennosides-docusate sodium  2 tablet Oral Nightly    gabapentin  300 mg Oral TID    dexamethasone  4 mg Oral 2 times per day    pantoprazole  40 mg Oral QAM AC    metoprolol succinate  50 mg Oral BID    montelukast  10 mg Oral Nightly    pravastatin  20 mg Oral Dinner    sodium chloride flush  10 mL IntraVENous 2 times per day    fentaNYL  1 patch TransDERmal Q72H     Awake and alert, perrl eomi motor full  Assessment:  Patient Active Problem List   Diagnosis    S/P mastectomy, bilateral    Breast cancer metastasized to axillary lymph node (HCC)    Cord compression (HCC)    Intractable back pain    UTI (urinary tract infection)    Debility     Plan:continue brace when Karey Castrejon MD M.D.

## 2022-04-20 NOTE — PROGRESS NOTES
Comprehensive Nutrition Assessment    Type and Reason for Visit:  Reassess    Nutrition Recommendations/Plan:   Continue Current Diet, Start Oral Nutrition Supplement     Malnutrition Assessment:  Malnutrition Status: At risk for malnutrition (04/20/22 1132)    Context:  Acute Illness     Findings of the 6 clinical characteristics of malnutrition:  Energy Intake:  75% or less of estimated energy requirements for 7 or more days  Weight Loss:  No significant weight loss (per EMR review previous encounters)     Body Fat Loss:  No significant body fat loss     Muscle Mass Loss:  No significant muscle mass loss    Fluid Accumulation:  No significant fluid accumulation     Strength:  Not Performed    Nutrition Assessment:    Pt at nutritional risk d/t decreased PO intake since last RD assessment (average ~50-75%). Admit w/ spinal cord compression 2/2 Metastatic breast cancer (liver, bone, lymph node). XRT initiated. PMHx DM & COPD. Will add Ensure HP BID & monitor. Nutrition Related Findings:    Pt alert, -I/O's 7L, no edema, active BS Wound Type:  (bx site)       Current Nutrition Intake & Therapies:    Average Meal Intake: 51-75% (variable intakes average)    Anthropometric Measures:  Height: 5' (152.4 cm)  Ideal Body Weight (IBW): 100 lbs (45 kg)    Current Body Weight: 172 lb (78 kg), 176 % IBW. Weight Source: Stated  Current BMI (kg/m2): 33.6  Usual Body Weight: 174 lb 2 oz (79 kg) (4/22/21 measured wt from EMR encounter)                 BMI Categories: Obese Class 1 (BMI 30.0-34. 9)    Estimated Daily Nutrient Needs:  Energy Requirements Based On: Formula  Weight Used for Energy Requirements: Current  Energy (kcal/day): MSJ 1252 x 1.3 SF= 4393-5624  Weight Used for Protein Requirements: Ideal  Protein (g/day): 1.5-1.8 g/kg IBW; 70-80  Method Used for Fluid Requirements: 1 ml/kcal  Fluid (ml/day): 0764-9671    Nutrition Diagnosis:   · Inadequate oral intake related to pain as evidenced by intake 51-75%      Nutrition Interventions:   Nutrition Education/Counseling: Education not indicated  Coordination of Nutrition Care: Continue to monitor while inpatient       Goals:     Goals: PO intake 75% or greater       Nutrition Monitoring and Evaluation:   Food/Nutrient Intake Outcomes: Food and Nutrient Intake,Supplement Intake  Physical Signs/Symptoms Outcomes: Biochemical Data,GI Status,Fluid Status or Edema,Nutrition Focused Physical Findings,Skin,Weight    Discharge Planning:     Too soon to determine     Arelia JEFFREY Torres, LD  Contact: Ext [phone]

## 2022-04-20 NOTE — CARE COORDINATION
4/20/2022 social work transition of care planning  Pt plan is home,once rad tx completed. Sw left message for Priya Alvarenga oncology sw x [phone] to determine if pt could be set up for transportation for tx. Sw will follow. Electronically signed by REID Mclain on 4/20/2022 at 8:34 AM       Addendum: Frannie followed up with Priya Alvarenga [phone] about transport, transport could be set up but requires atleast 4 days notice for C.A.R.T.S. pt has [employer],which requires 48 hours notice. Frannie followed up with pt at bedside. Pt still unable to tolerate daily travel for treatments. Per pt,just transporting to radiation here in the bed is extremely painful.    Electronically signed by REID Mclain on 4/20/2022 at 12:12 PM

## 2022-04-20 NOTE — ANESTHESIA POSTPROCEDURE EVALUATION
Department of Anesthesiology  Postprocedure Note    Patient: Jonathan Restrepo  MRN: [id number]  YOB: 1955  Date of evaluation: 4/20/2022  Time:  4:07 PM     Procedure Summary     Date: 04/15/22 Room / Location: [address] [place] [address] 20 / [facility]    Anesthesia Start: 3564 Anesthesia Stop: Cathren Bong    Procedure: VETEBRAL BODY LEFT L-4 BIOPSY (N/A ) Diagnosis: (/)    Surgeons: Tirso Mcarthur MD Responsible Provider: Ramila Gallagher MD    Anesthesia Type: general ASA Status: 3          Anesthesia Type: general    Kunal Phase I: Kunal Score: 10    Kunal Phase II:      Last vitals: Reviewed and per EMR flowsheets.        Anesthesia Post Evaluation    Patient location during evaluation: PACU  Patient participation: complete - patient participated  Level of consciousness: awake and alert  Airway patency: patent  Nausea & Vomiting: no nausea and no vomiting  Complications: no  Cardiovascular status: hemodynamically stable and blood pressure returned to baseline  Respiratory status: acceptable  Hydration status: euvolemic

## 2022-04-20 NOTE — PROGRESS NOTES
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 Elderly   HEENT: No thrush no obv swelling to lips/tongue    Respiratory: No wheezing auscultation   cardiovascular: Pulse reg audible S1-S2    Neurologic: awake, alert and attentive  ASSESSMENT/  PLAN:      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  Palliative care consulted for symptoms management.   Decadron  Perhaps add lidoderm patch and /or orellana 2 inhibit     Metastatic breast cancer    Radiation therapy was initiated    DVT secondary to malignancy: on eliquis, restarted   Urinary tract infection:  Urine culture + Escherichia coli sensitive to current Tx, completed course of antibiotics   Hypertension: on troprol XL controlled  COPD: c/w breathing treatment   Hyponatremia: corrected, Held hydrochlorothiazide continue to hold this as blood pressures currently in acceptable range  Hypokalemia: corrected  Anxiety: she is using Ativan as needed which helps but does not last for long time, will add Vistaril as needed  DISPOSITION: To be determined    Medications:  REVIEWED DAILY    Infusion Medications    sodium chloride       Scheduled Medications    fentaNYL  1 patch TransDERmal Q72H    apixaban  5 mg Oral BID    polyethylene glycol  17 g Oral Daily    sennosides-docusate sodium  2 tablet Oral Nightly    gabapentin  300 mg Oral TID    dexamethasone  4 mg Oral 2 times per day    pantoprazole  40 mg Oral QAM AC    metoprolol succinate  50 mg Oral BID    montelukast  10 mg Oral Nightly    pravastatin  20 mg Oral Dinner    sodium chloride flush  10 mL IntraVENous 2 times per day     PRN Meds: oxyCODONE, hydrOXYzine, diphenhydrAMINE, LORazepam, albuterol, zolpidem, sodium chloride flush, sodium chloride, promethazine **OR** ondansetron    Labs:     Recent Labs     04/18/22  0601   WBC 8.3   HGB 10.2*   HCT 32.8*          Recent Labs     04/18/22  0601      K 4.1   CL 98   CO2 26   BUN 18   CREATININE 0.4*   CALCIUM 8.7   PHOS 2.3*         Radiology:     +++++++++++++++++++++++++++++++++++++++++++++++++  Zac Gupta DO  [address]  +++++++++++++++++++++++++++++++++++++++++++++++++  NOTE: This report was transcribed using voice recognition software. Every effort was made to ensure accuracy; however, inadvertent computerized transcription errors may be present.

## 2022-04-20 NOTE — PROGRESS NOTES
Palliative Care Department  [phone]  Palliative Care Progress Note  Provider KATHIE Bañuelos - CNP      PATIENT: Unique Garcia  : 1955  MRN: [id number]  ADMISSION DATE: 2022  7:12 PM  Referring Provider: Nayla Good MD    Palliative Medicine was consulted on hospital day 12 for assistance with Goals of care, Symptom management     HPI:     Madelyn Alvarado is a 61 y.o. y/o female with a history of static breast cancer, anxiety, COPD, depression, diabetes mellitus, hypertension, restless leg syndrome who presented to [facility]) on 2022 as a transfer from [facility] for a neurosurgery consultation due to cord compression at T9 from extensive metastases disease to  thoracic and lumbar spine. After have been evaluated by neurosurgery, patient is not interested in surgical intervention, and off-the-shelf TLSO brace was recommended. 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 every 4 hours as needed  o Hydroxyzine 25 mg 3 times daily as needed   Pain due to neoplasm  o Fentanyl 25 mcg patch every 72 hours  o gabapentin 300 mg 3 times daily  o Oxy IR 15mg every 4 hours as needed    Palliative Care Encounter / Counseling Regarding Goals of Care  Please see detailed goals of care discussion as below   At this time, Unique Garcia, Does have capacity for medical decision-making. Capacity is time limited and situation/question specific   Outcome of goals of care meeting:  o Continue with current medical treatment  o Wishes for CODE STATUS to be changed to limited DNR CCA DNI  o Does not want aggressive surgical intervention, but is hoping for conservative treatment.     Code status Limited DNR CCA DNI   Advanced Directives: no POA or living will in [facility] NOK:    Spiritual assessment: no spiritual distress identified  Bereavement and grief: to be determined  Referrals to: none today    Thank you for the opportunity to participate in the care of Justina Gardner. KATHIE Abreu - NIKOLAS  Palliative Medicine     SUBJECTIVE:     Details of Conversation:    Chart reviewed and patient seen. Patient sitting up in bed, family at bedside. Patient reports continued 8/10 pain. We discussed increasing fentanyl patch and patient is in agreement. Will also increase oxycodone immediate release. Prognosis: Guarded    OBJECTIVE:     BP (!) 140/71   Pulse 77   Temp 97.5 °F (36.4 °C) (Temporal)   Resp 16   Ht 5' (1.524 m)   Wt 172 lb (78 kg)   SpO2 100%   BMI 33.59 kg/m²     Physical Examination:  Gen: NAD, awake, alert   HEENT: normocephalic, atraumatic, PERRL, EOMI,   Neck: trachea midline, no JVD  Lungs: respirations easy and not labored,   Heart: regular rate and rhythm, distant heart tones,   Abdomen: normoactive bowel sounds, soft, non-tender  Extremities: no clubbing, cyanosis or edema, moving all extremities    Skin: warm, dry without rashes, lesions, bruising  Neuro: awake, alert, oriented x 3, follows commands, no gross neurologic deficit     Objective data reviewed: labs, images, records, medication use, vitals and chart    Time/Communication  Greater than 50% of time spent, total 15 minutes in counseling and coordination of care at the bedside regarding goals of care and symptom management. Thank you for allowing Palliative Medicine to participate in the care of Justina Gardner. Note: This report was completed using computer4-Tell voiced recognition software. Every effort has been made to ensure accuracy; however, inadvertent computerized transcription errors may be present.

## 2022-04-21 ENCOUNTER — HOSPITAL ENCOUNTER (OUTPATIENT)
Dept: RADIATION ONCOLOGY | Age: 67
Discharge: HOME | End: 2022-04-21
Attending: RADIOLOGY
Payer: COMMERCIAL

## 2022-04-21 PROCEDURE — 6360000002 HC RX W HCPCS: Performed by: NEUROLOGICAL SURGERY

## 2022-04-21 PROCEDURE — 77387 GUIDANCE FOR RADJ TX DLVR: CPT | Performed by: RADIOLOGY

## 2022-04-21 PROCEDURE — 77412 RADIATION TX DELIVERY LVL 3: CPT | Performed by: RADIOLOGY

## 2022-04-21 PROCEDURE — 99232 SBSQ HOSP IP/OBS MODERATE 35: CPT | Performed by: NURSE PRACTITIONER

## 2022-04-21 PROCEDURE — 77014 PR CT GUIDANCE PLACEMENT RAD THERAPY FIELDS: CPT | Performed by: RADIOLOGY

## 2022-04-21 PROCEDURE — 6370000000 HC RX 637 (ALT 250 FOR IP): Performed by: INTERNAL MEDICINE

## 2022-04-21 PROCEDURE — 6370000000 HC RX 637 (ALT 250 FOR IP): Performed by: NEUROLOGICAL SURGERY

## 2022-04-21 PROCEDURE — 6370000000 HC RX 637 (ALT 250 FOR IP): Performed by: NURSE PRACTITIONER

## 2022-04-21 PROCEDURE — 1200000000 HC SEMI PRIVATE

## 2022-04-21 PROCEDURE — 2580000003 HC RX 258: Performed by: NEUROLOGICAL SURGERY

## 2022-04-21 RX ADMIN — OXYCODONE HYDROCHLORIDE 15 MG: 15 TABLET ORAL at 16:55

## 2022-04-21 RX ADMIN — APIXABAN 5 MG: 5 TABLET, FILM COATED ORAL at 10:49

## 2022-04-21 RX ADMIN — SENNOSIDES AND DOCUSATE SODIUM 2 TABLET: 50; 8.6 TABLET ORAL at 20:59

## 2022-04-21 RX ADMIN — LORAZEPAM 0.5 MG: 0.5 TABLET ORAL at 19:26

## 2022-04-21 RX ADMIN — PRAVASTATIN SODIUM 20 MG: 20 TABLET ORAL at 16:55

## 2022-04-21 RX ADMIN — OXYCODONE HYDROCHLORIDE 15 MG: 15 TABLET ORAL at 10:49

## 2022-04-21 RX ADMIN — PANTOPRAZOLE SODIUM 40 MG: 40 TABLET, DELAYED RELEASE ORAL at 06:57

## 2022-04-21 RX ADMIN — GABAPENTIN 300 MG: 300 CAPSULE ORAL at 14:29

## 2022-04-21 RX ADMIN — Medication 10 ML: at 10:52

## 2022-04-21 RX ADMIN — Medication 10 ML: at 21:00

## 2022-04-21 RX ADMIN — METOPROLOL SUCCINATE 50 MG: 50 TABLET, EXTENDED RELEASE ORAL at 10:49

## 2022-04-21 RX ADMIN — DEXAMETHASONE 4 MG: 4 TABLET ORAL at 10:50

## 2022-04-21 RX ADMIN — METOPROLOL SUCCINATE 50 MG: 50 TABLET, EXTENDED RELEASE ORAL at 20:59

## 2022-04-21 RX ADMIN — 0.12% CHLORHEXIDINE GLUCONATE 15 ML: 1.2 RINSE ORAL at 10:48

## 2022-04-21 RX ADMIN — 0.12% CHLORHEXIDINE GLUCONATE 15 ML: 1.2 RINSE ORAL at 20:59

## 2022-04-21 RX ADMIN — DEXAMETHASONE 4 MG: 4 TABLET ORAL at 20:59

## 2022-04-21 RX ADMIN — GABAPENTIN 300 MG: 300 CAPSULE ORAL at 10:49

## 2022-04-21 RX ADMIN — CELECOXIB 100 MG: 100 CAPSULE ORAL at 20:59

## 2022-04-21 RX ADMIN — MONTELUKAST SODIUM 10 MG: 10 TABLET ORAL at 21:00

## 2022-04-21 RX ADMIN — CELECOXIB 100 MG: 100 CAPSULE ORAL at 10:50

## 2022-04-21 RX ADMIN — APIXABAN 5 MG: 5 TABLET, FILM COATED ORAL at 21:00

## 2022-04-21 RX ADMIN — GABAPENTIN 300 MG: 300 CAPSULE ORAL at 20:59

## 2022-04-21 ASSESSMENT — PAIN DESCRIPTION - FREQUENCY: FREQUENCY: INTERMITTENT

## 2022-04-21 ASSESSMENT — PAIN SCALES - GENERAL
PAINLEVEL_OUTOF10: 0
PAINLEVEL_OUTOF10: 8
PAINLEVEL_OUTOF10: 8

## 2022-04-21 ASSESSMENT — PAIN DESCRIPTION - LOCATION
LOCATION: BACK
LOCATION: BACK

## 2022-04-21 ASSESSMENT — PAIN DESCRIPTION - ONSET: ONSET: GRADUAL

## 2022-04-21 ASSESSMENT — PAIN DESCRIPTION - DESCRIPTORS: DESCRIPTORS: ACHING;DISCOMFORT;SORE

## 2022-04-21 NOTE — CARE COORDINATION
4/21/2022 social work transition of care planning  Pt plan is to return home,once radiation tx completed. Sw confirmed that community palliative to be contacted once pt is discharged (SANAM)[phone].   Electronically signed by REID Rodas on 4/21/2022 at 3:32 PM

## 2022-04-21 NOTE — PROGRESS NOTES
Occupational Therapy  OT BEDSIDE TREATMENT NOTE   [address] [facility] [address] [address]      Date:2022  Patient Name: Mendel Minks  MRN: [id number]  : 1955  Room: [address] [place] [address]     Pt's chart reviewed and treatment attempted, pt declined at this time due to not sleeping at night. Will attempt at a later time/date.           Duy Dunn

## 2022-04-21 NOTE — PROGRESS NOTES
DEPARTMENT OF RADIATION ONCOLOGY   ON TREATMENT VISIT       4/21/2022      NAME:  Kiera Ulloa    YOB: 1955    DIAGNOSIS:     SUBJECTIVE:   Kiera Ulloa has now received 2100cGy in 3 fractions directed to the Lower T spine. She is tolerating treatment well without significant side effects. Denies dysphagia. Back pain is still present and her narcotic medications were recently increased. She is walking without difficulty and denies motor weakness or paresthesias. She remains an inpatient. Past medical, surgical, social and family histories reviewed and updated as indicated. PAIN: Moderate    ALLERGIES:  Codeine, Adhesive tape, and Darvocet a500 [propoxyphene n-acetaminophen]         No current facility-administered medications for this encounter. No current outpatient medications on file.      Facility-Administered Medications Ordered in Other Encounters   Medication Dose Route Frequency Provider Last Rate Last Admin    fentaNYL (DURAGESIC) 25 MCG/HR 1 patch  1 patch TransDERmal Q72H Hickman Donate, APRN - CNP   1 patch at 04/20/22 1513    oxyCODONE (OXY-IR) immediate release tablet 15 mg  15 mg Oral Q4H PRN Hickman Donate, APRN - CNP   15 mg at 04/20/22 2117    celecoxib (CELEBREX) capsule 100 mg  100 mg Oral BID Bala Sand, DO   100 mg at 04/20/22 2117    medicated lip balm (BLISTEX/CARMEX) stick   Topical PRN Bala Sand, DO        chlorhexidine (PERIDEX) 0.12 % solution 15 mL  15 mL Mouth/Throat BID Bala Sand, DO   15 mL at 04/20/22 2117    hydrOXYzine (VISTARIL) capsule 25 mg  25 mg Oral TID PRN Scarlet Stanford MD   25 mg at 04/18/22 1101    apixaban (ELIQUIS) tablet 5 mg  5 mg Oral BID Chandrika Hinds MD   5 mg at 04/20/22 2117    diphenhydrAMINE (BENADRYL) tablet 12.5 mg  12.5 mg Oral Q6H PRN Juan Manuel Bowser MD   12.5 mg at 04/15/22 1233    polyethylene glycol (GLYCOLAX) packet 17 g  17 g Oral Daily Princess Yeny APRN - CNP   17 g at Readings from Last 3 Encounters:   04/10/22 172 lb (78 kg)   08/21/18 189 lb (85.7 kg)   08/17/18 187 lb (84.8 kg)       ASSESSMENT/PLAN:     Patient is tolerating treatments well with expected toxicities. Current and planned dose reviewed. Goals of treatment and potential side effects were reviewed with the patient. Treatment imaging has been personally reviewed for accuracy and precision. Questions answered to apparent satisfaction. Treatments will continue as planned. Thank you for the opportunity to participate in multidisciplinary management of this remarkable and pleasant patient. Lyle Barrow M.D.     Department of Radiation Oncology    [facility]) [facility]: [phone] (JGO: [phone])  [address] [place] [address]: [phone] (XWJ: [phone])  [facility]:  [phone] (WNS:  [phone])

## 2022-04-21 NOTE — PROGRESS NOTES
Palliative Care Department  [phone]  Palliative Care Progress Note  Provider KATHIE Myrick CNP      PATIENT: Renetta Lang  : 1955  MRN: [id number]  ADMISSION DATE: 2022  7:12 PM  Referring Provider: Adan Maldonado MD    Palliative Medicine was consulted on hospital day 13 for assistance with Goals of care, Symptom management     HPI:     Bre Morris is a 61 y.o. y/o female with a history of static breast cancer, anxiety, COPD, depression, diabetes mellitus, hypertension, restless leg syndrome who presented to [facility]) on 2022 as a transfer from [facility] for a neurosurgery consultation due to cord compression at T9 from extensive metastases disease to  thoracic and lumbar spine. After have been evaluated by neurosurgery, patient is not interested in surgical intervention, and off-the-shelf TLSO brace was recommended. 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 every 4 hours as needed  o Hydroxyzine 25 mg 3 times daily as needed   Pain due to neoplasm  o Fentanyl 25 mcg patch every 72 hours  o gabapentin 300 mg 3 times daily  o Oxy IR 15mg every 4 hours as needed    Palliative Care Encounter / Counseling Regarding Goals of Care  Please see detailed goals of care discussion as below   At this time, Renetta Lang, Does have capacity for medical decision-making. Capacity is time limited and situation/question specific   Outcome of goals of care meeting:  o Continue with current medical treatment  o Wishes for CODE STATUS to be changed to limited DNR CCA DNI  o Does not want aggressive surgical intervention, but is hoping for conservative treatment.     Code status Limited DNR CCA DNI   Advanced Directives: no POA or living will in [facility] NOK:    Spiritual assessment: no spiritual distress identified  Bereavement and grief: to be determined  Referrals to: none today    Thank you for the opportunity to participate in the care of Alina Leblanc. Yarely Holm, KATHIE - CNP  Palliative Medicine     SUBJECTIVE:     Details of Conversation:    Patient met with Shruthi Beckham at bedside. She was in very good spirits today and states that this is the best she has felt. She believes the increase in fentanyl patch has improved her pain. This morning she did have an upset stomach and some diarrhea. She denies any nausea or other needs at this time. Prognosis: Guarded    OBJECTIVE:     BP (!) 152/90   Pulse 73   Temp 96.6 °F (35.9 °C)   Resp 18   Ht 5' (1.524 m)   Wt 172 lb (78 kg)   SpO2 100%   BMI 33.59 kg/m²     Physical Examination:  Gen: NAD, awake, alert   HEENT: normocephalic, atraumatic, PERRL, EOMI,   Neck: trachea midline, no JVD  Lungs: respirations easy and not labored,   Heart: regular rate and rhythm, distant heart tones,   Abdomen: normoactive bowel sounds, soft, non-tender  Extremities: no clubbing, cyanosis or edema, moving all extremities    Skin: warm, dry without rashes, lesions, bruising  Neuro: awake, alert, oriented x 3, follows commands, no gross neurologic deficit     Objective data reviewed: labs, images, records, medication use, vitals and chart    Time/Communication  Greater than 50% of time spent, total 25 minutes in counseling and coordination of care at the bedside regarding goals of care and symptom management. Thank you for allowing Palliative Medicine to participate in the care of Alina Leblanc. Note: This report was completed using computerViva Vision voiced recognition software. Every effort has been made to ensure accuracy; however, inadvertent computerized transcription errors may be present.

## 2022-04-21 NOTE — PROGRESS NOTES
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 Elderly   HEENT: No thrush no obv swelling to lips/tongue    Respiratory: No wheezing auscultation   cardiovascular: Pulse reg audible S1-S2    Neurologic: awake, alert and attentive  ASSESSMENT/  PLAN:  Contin with rad rx and pt and pain control    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  Palliative care consulted for symptoms management.   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: on eliquis, restarted   Urinary tract infection:  Urine culture + Escherichia coli sensitive to current Tx, completed course of antibiotics   Hypertension: on troprol XL controlled  COPD: c/w breathing treatment   Hyponatremia: corrected, Held hydrochlorothiazide continue to hold this as blood pressures currently in acceptable range  Hypokalemia: corrected  Anxiety: she is using Ativan as needed which helps but does not last for long time, will add Vistaril as needed  DISPOSITION: patient desires to return home    Medications:  REVIEWED DAILY    Infusion Medications    sodium chloride       Scheduled Medications    fentaNYL  1 patch TransDERmal Q72H    celecoxib  100 mg Oral BID    chlorhexidine  15 mL Mouth/Throat BID    apixaban  5 mg Oral BID    polyethylene glycol  17 g Oral Daily    sennosides-docusate sodium  2 tablet Oral Nightly    gabapentin  300 mg Oral TID    dexamethasone  4 mg Oral 2 times per day    pantoprazole  40 mg Oral QAM AC    metoprolol succinate  50 mg Oral BID    montelukast  10 mg Oral Nightly    pravastatin  20 mg Oral Dinner    sodium chloride flush  10 mL IntraVENous 2 times per day     PRN Meds: oxyCODONE, medicated lip balm, hydrOXYzine, diphenhydrAMINE, LORazepam, albuterol, zolpidem, sodium chloride flush, sodium chloride, promethazine **OR** ondansetron    Labs:     No results for input(s): WBC, HGB, HCT, PLT in the last 72 hours. No results for input(s): NA, K, CL, CO2, BUN, CREATININE, CALCIUM, PHOS in the last 72 hours. Invalid input(s): [address]      Radiology:     +++++++++++++++++++++++++++++++++++++++++++++++++  DO Umberto Perez Physician - [address] [place], [place]  +++++++++++++++++++++++++++++++++++++++++++++++++  NOTE: This report was transcribed using voice recognition software. Every effort was made to ensure accuracy; however, inadvertent computerized transcription errors may be present.

## 2022-04-22 ENCOUNTER — HOSPITAL ENCOUNTER (OUTPATIENT)
Dept: RADIATION ONCOLOGY | Age: 67
Discharge: HOME | End: 2022-04-22
Attending: RADIOLOGY
Payer: COMMERCIAL

## 2022-04-22 PROCEDURE — 6370000000 HC RX 637 (ALT 250 FOR IP): Performed by: NEUROLOGICAL SURGERY

## 2022-04-22 PROCEDURE — 1200000000 HC SEMI PRIVATE

## 2022-04-22 PROCEDURE — 77412 RADIATION TX DELIVERY LVL 3: CPT | Performed by: RADIOLOGY

## 2022-04-22 PROCEDURE — 6370000000 HC RX 637 (ALT 250 FOR IP): Performed by: NURSE PRACTITIONER

## 2022-04-22 PROCEDURE — 2580000003 HC RX 258: Performed by: NEUROLOGICAL SURGERY

## 2022-04-22 PROCEDURE — 6370000000 HC RX 637 (ALT 250 FOR IP): Performed by: INTERNAL MEDICINE

## 2022-04-22 PROCEDURE — 99231 SBSQ HOSP IP/OBS SF/LOW 25: CPT | Performed by: NURSE PRACTITIONER

## 2022-04-22 PROCEDURE — 77014 PR CT GUIDANCE PLACEMENT RAD THERAPY FIELDS: CPT | Performed by: RADIOLOGY

## 2022-04-22 PROCEDURE — 77387 GUIDANCE FOR RADJ TX DLVR: CPT | Performed by: RADIOLOGY

## 2022-04-22 PROCEDURE — 6360000002 HC RX W HCPCS: Performed by: NEUROLOGICAL SURGERY

## 2022-04-22 RX ADMIN — DEXAMETHASONE 4 MG: 4 TABLET ORAL at 21:36

## 2022-04-22 RX ADMIN — GABAPENTIN 300 MG: 300 CAPSULE ORAL at 08:16

## 2022-04-22 RX ADMIN — LIDOCAINE HYDROCHLORIDE: 20 SOLUTION ORAL; TOPICAL at 08:33

## 2022-04-22 RX ADMIN — PANTOPRAZOLE SODIUM 40 MG: 40 TABLET, DELAYED RELEASE ORAL at 08:16

## 2022-04-22 RX ADMIN — DEXAMETHASONE 4 MG: 4 TABLET ORAL at 08:16

## 2022-04-22 RX ADMIN — CELECOXIB 100 MG: 100 CAPSULE ORAL at 08:16

## 2022-04-22 RX ADMIN — Medication 10 ML: at 08:21

## 2022-04-22 RX ADMIN — APIXABAN 5 MG: 5 TABLET, FILM COATED ORAL at 21:36

## 2022-04-22 RX ADMIN — 0.12% CHLORHEXIDINE GLUCONATE 15 ML: 1.2 RINSE ORAL at 21:35

## 2022-04-22 RX ADMIN — MONTELUKAST SODIUM 10 MG: 10 TABLET ORAL at 21:36

## 2022-04-22 RX ADMIN — PRAVASTATIN SODIUM 20 MG: 20 TABLET ORAL at 17:41

## 2022-04-22 RX ADMIN — ALUMINUM HYDROXIDE, MAGNESIUM HYDROXIDE, AND SIMETHICONE: 200; 200; 20 SUSPENSION ORAL at 14:36

## 2022-04-22 RX ADMIN — OXYCODONE HYDROCHLORIDE 15 MG: 15 TABLET ORAL at 08:16

## 2022-04-22 RX ADMIN — CELECOXIB 100 MG: 100 CAPSULE ORAL at 21:36

## 2022-04-22 RX ADMIN — OXYCODONE HYDROCHLORIDE 15 MG: 15 TABLET ORAL at 13:08

## 2022-04-22 RX ADMIN — ALUMINUM HYDROXIDE, MAGNESIUM HYDROXIDE, AND SIMETHICONE: 200; 200; 20 SUSPENSION ORAL at 19:57

## 2022-04-22 RX ADMIN — 0.12% CHLORHEXIDINE GLUCONATE 15 ML: 1.2 RINSE ORAL at 08:18

## 2022-04-22 RX ADMIN — GABAPENTIN 300 MG: 300 CAPSULE ORAL at 21:36

## 2022-04-22 RX ADMIN — ZOLPIDEM TARTRATE 10 MG: 5 TABLET ORAL at 21:37

## 2022-04-22 RX ADMIN — APIXABAN 5 MG: 5 TABLET, FILM COATED ORAL at 08:16

## 2022-04-22 RX ADMIN — OXYCODONE HYDROCHLORIDE 15 MG: 15 TABLET ORAL at 17:41

## 2022-04-22 RX ADMIN — METOPROLOL SUCCINATE 50 MG: 50 TABLET, EXTENDED RELEASE ORAL at 21:36

## 2022-04-22 RX ADMIN — GABAPENTIN 300 MG: 300 CAPSULE ORAL at 14:12

## 2022-04-22 RX ADMIN — SENNOSIDES AND DOCUSATE SODIUM 2 TABLET: 50; 8.6 TABLET ORAL at 21:36

## 2022-04-22 ASSESSMENT — PAIN SCALES - GENERAL
PAINLEVEL_OUTOF10: 6
PAINLEVEL_OUTOF10: 6
PAINLEVEL_OUTOF10: 0
PAINLEVEL_OUTOF10: 5

## 2022-04-22 ASSESSMENT — PAIN DESCRIPTION - LOCATION
LOCATION: BACK
LOCATION: BACK

## 2022-04-22 ASSESSMENT — PAIN DESCRIPTION - FREQUENCY: FREQUENCY: INTERMITTENT

## 2022-04-22 ASSESSMENT — PAIN DESCRIPTION - DESCRIPTORS
DESCRIPTORS: ACHING;DISCOMFORT;SORE
DESCRIPTORS: ACHING;DISCOMFORT;SORE

## 2022-04-22 NOTE — CARE COORDINATION
4/22/2022 social work transition of care planning  Pt plan is home once radiation tx completed. Community palliative to be notified once pt is discharged (KRP)[phone].    Electronically signed by RIED Diamond on 4/22/2022 at 7:40 AM

## 2022-04-22 NOTE — PROGRESS NOTES
Hospitalist Progress Note      SYNOPSIS: Patient admitted on 4/8/2022 for Cord compression [facility])      SUBJECTIVE    OBJECTIVE:   BP (!) 148/80   Pulse 77   Temp 98.7 °F (37.1 °C) (Temporal)   Resp 17   Ht 5' (1.524 m)   Wt 172 lb (78 kg)   SpO2 100%   BMI 33.59 kg/m²   General appearance: Not diaphoretic. Elderly   HEENT: No thrush no obv swelling to lips/tongue    Respiratory: No wheezing auscultation   cardiovascular: Pulse reg audible S1-S2    Neurologic: awake, alert and attentive  ASSESSMENT/  PLAN:  Contin with rad rx and pt and pain control   anticipated 2 more treatments next mond and tuesd  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  Palliative care consulted for symptoms management.   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: on eliquis, restarted   Urinary tract infection:  Urine culture + Escherichia coli sensitive to current Tx, completed course of antibiotics   Hypertension: on troprol XL controlled  COPD: c/w breathing treatment   Hyponatremia: corrected, Held hydrochlorothiazide continue to hold this as blood pressures currently in acceptable range  Hypokalemia: corrected  Anxiety: she is using Ativan as needed    DISPOSITION: patient desires to return home    Medications:  REVIEWED DAILY    Infusion Medications    sodium chloride       Scheduled Medications    fentaNYL  1 patch TransDERmal Q72H    celecoxib  100 mg Oral BID    chlorhexidine  15 mL Mouth/Throat BID    apixaban  5 mg Oral BID    polyethylene glycol  17 g Oral Daily    sennosides-docusate sodium  2 tablet Oral Nightly    gabapentin  300 mg Oral TID    dexamethasone  4 mg Oral 2 times per day    pantoprazole  40 mg Oral QAM AC    metoprolol succinate  50 mg Oral BID  montelukast  10 mg Oral Nightly    pravastatin  20 mg Oral Dinner    sodium chloride flush  10 mL IntraVENous 2 times per day     PRN Meds: GI cocktail, oxyCODONE, medicated lip balm, hydrOXYzine, diphenhydrAMINE, LORazepam, albuterol, zolpidem, sodium chloride flush, sodium chloride, promethazine **OR** ondansetron    Labs:     No results for input(s): WBC, HGB, HCT, PLT in the last 72 hours. No results for input(s): NA, K, CL, CO2, BUN, CREATININE, CALCIUM, PHOS in the last 72 hours. Invalid input(s): [address]      Radiology:     +++++++++++++++++++++++++++++++++++++++++++++++++  DO Umberto Salvador Physician - [address] [place], [place]  +++++++++++++++++++++++++++++++++++++++++++++++++  NOTE: This report was transcribed using voice recognition software. Every effort was made to ensure accuracy; however, inadvertent computerized transcription errors may be present.

## 2022-04-23 PROCEDURE — 6360000002 HC RX W HCPCS: Performed by: NEUROLOGICAL SURGERY

## 2022-04-23 PROCEDURE — 2580000003 HC RX 258: Performed by: NEUROLOGICAL SURGERY

## 2022-04-23 PROCEDURE — 1200000000 HC SEMI PRIVATE

## 2022-04-23 PROCEDURE — 6370000000 HC RX 637 (ALT 250 FOR IP): Performed by: NEUROLOGICAL SURGERY

## 2022-04-23 PROCEDURE — 6370000000 HC RX 637 (ALT 250 FOR IP): Performed by: NURSE PRACTITIONER

## 2022-04-23 PROCEDURE — 6370000000 HC RX 637 (ALT 250 FOR IP): Performed by: INTERNAL MEDICINE

## 2022-04-23 RX ADMIN — MONTELUKAST SODIUM 10 MG: 10 TABLET ORAL at 21:25

## 2022-04-23 RX ADMIN — OXYCODONE HYDROCHLORIDE 15 MG: 15 TABLET ORAL at 14:26

## 2022-04-23 RX ADMIN — METOPROLOL SUCCINATE 50 MG: 50 TABLET, EXTENDED RELEASE ORAL at 08:17

## 2022-04-23 RX ADMIN — Medication 10 ML: at 08:19

## 2022-04-23 RX ADMIN — DEXAMETHASONE 4 MG: 4 TABLET ORAL at 21:25

## 2022-04-23 RX ADMIN — METOPROLOL SUCCINATE 50 MG: 50 TABLET, EXTENDED RELEASE ORAL at 21:25

## 2022-04-23 RX ADMIN — GABAPENTIN 300 MG: 300 CAPSULE ORAL at 14:50

## 2022-04-23 RX ADMIN — Medication 10 ML: at 23:33

## 2022-04-23 RX ADMIN — PRAVASTATIN SODIUM 20 MG: 20 TABLET ORAL at 16:12

## 2022-04-23 RX ADMIN — 0.12% CHLORHEXIDINE GLUCONATE 15 ML: 1.2 RINSE ORAL at 21:24

## 2022-04-23 RX ADMIN — SENNOSIDES AND DOCUSATE SODIUM 2 TABLET: 50; 8.6 TABLET ORAL at 21:26

## 2022-04-23 RX ADMIN — APIXABAN 5 MG: 5 TABLET, FILM COATED ORAL at 21:26

## 2022-04-23 RX ADMIN — DEXAMETHASONE 4 MG: 4 TABLET ORAL at 08:18

## 2022-04-23 RX ADMIN — PANTOPRAZOLE SODIUM 40 MG: 40 TABLET, DELAYED RELEASE ORAL at 08:17

## 2022-04-23 RX ADMIN — 0.12% CHLORHEXIDINE GLUCONATE 15 ML: 1.2 RINSE ORAL at 08:18

## 2022-04-23 RX ADMIN — CELECOXIB 100 MG: 100 CAPSULE ORAL at 21:24

## 2022-04-23 RX ADMIN — GABAPENTIN 300 MG: 300 CAPSULE ORAL at 08:17

## 2022-04-23 RX ADMIN — APIXABAN 5 MG: 5 TABLET, FILM COATED ORAL at 08:17

## 2022-04-23 RX ADMIN — ALUMINUM HYDROXIDE, MAGNESIUM HYDROXIDE, AND SIMETHICONE: 200; 200; 20 SUSPENSION ORAL at 08:27

## 2022-04-23 RX ADMIN — OXYCODONE HYDROCHLORIDE 15 MG: 15 TABLET ORAL at 08:26

## 2022-04-23 RX ADMIN — CELECOXIB 100 MG: 100 CAPSULE ORAL at 08:18

## 2022-04-23 RX ADMIN — GABAPENTIN 300 MG: 300 CAPSULE ORAL at 21:25

## 2022-04-23 ASSESSMENT — PAIN SCALES - GENERAL
PAINLEVEL_OUTOF10: 9
PAINLEVEL_OUTOF10: 9
PAINLEVEL_OUTOF10: 5
PAINLEVEL_OUTOF10: 4

## 2022-04-23 ASSESSMENT — PAIN DESCRIPTION - ORIENTATION
ORIENTATION: MID

## 2022-04-23 ASSESSMENT — PAIN DESCRIPTION - FREQUENCY
FREQUENCY: INTERMITTENT
FREQUENCY: CONTINUOUS

## 2022-04-23 ASSESSMENT — PAIN DESCRIPTION - DESCRIPTORS
DESCRIPTORS: ACHING;DISCOMFORT;SHARP
DESCRIPTORS: ACHING;DISCOMFORT
DESCRIPTORS: ACHING;DISCOMFORT;SHARP
DESCRIPTORS: ACHING;DISCOMFORT

## 2022-04-23 ASSESSMENT — PAIN - FUNCTIONAL ASSESSMENT
PAIN_FUNCTIONAL_ASSESSMENT: ACTIVITIES ARE NOT PREVENTED

## 2022-04-23 ASSESSMENT — PAIN DESCRIPTION - LOCATION
LOCATION: BACK

## 2022-04-23 ASSESSMENT — PAIN DESCRIPTION - PAIN TYPE
TYPE: ACUTE PAIN;SURGICAL PAIN

## 2022-04-23 ASSESSMENT — PAIN DESCRIPTION - ONSET: ONSET: ON-GOING

## 2022-04-23 NOTE — PROGRESS NOTES
Hospitalist Progress Note      SYNOPSIS: Patient admitted on 4/8/2022 for Cord compression [facility])      SUBJECTIVE  In good spirits  Some episodes of \"heartburn\" receiving GI cocktail  Denies emesis denies diarrhea denies difficulty voiding  Den CP or SOB  Den constipation    OBJECTIVE: MEWS 1 based on vss below and mentation and rr 16  /74   Pulse 80   Temp 97.6 °F (36.4 °C)   Resp 16   Ht 5' (1.524 m)   Wt 172 lb (78 kg)   SpO2 98%   BMI 33.59 kg/m²   General appearance: Not diaphoretic. Not flushed  HEENT: No thrush no obv swelling to lips/tongue    Respiratory: No wheezing on  auscultation   cardiovascular: Pulse reg audible S1-S2    Neurologic: awake, alert and attentive  Psych= pleasant in good spirits mood and affect match  Derm= no hives or petechiae no papules    Sacral region intact  ASSESSMENT/  PLAN:  No labs since 4/18   may wish to draw on Sunday or Monday   for systems check  Contin with rad rx and pt and pain control   anticipated 2 more treatments next mond and tuesd   once completed move forward with anticipated diacharge  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  Palliative care consulted for symptoms management.   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: on eliquis, r  Urinary tract infection:  Urine culture + Escherichia coli sensitive to current Tx, completed course of antibiotics   Hypertension: on troprol XL controlled  COPD: c/w breathing treatment   Hyponatremia: corrected, Held hydrochlorothiazide continue to hold this as blood pressures currently in acceptable range  Hypokalemia: corrected  Anxiety: she is using Ativan as needed        DISPOSITION: patient desires to return home once this round of  treatments cycle Completed on tuesday    Medications:  REVIEWED DAILY    Infusion Medications    sodium chloride       Scheduled Medications    fentaNYL  1 patch TransDERmal Q72H    celecoxib  100 mg Oral BID    chlorhexidine  15 mL Mouth/Throat BID    apixaban  5 mg Oral BID    polyethylene glycol  17 g Oral Daily    sennosides-docusate sodium  2 tablet Oral Nightly    gabapentin  300 mg Oral TID    dexamethasone  4 mg Oral 2 times per day    pantoprazole  40 mg Oral QAM AC    metoprolol succinate  50 mg Oral BID    montelukast  10 mg Oral Nightly    pravastatin  20 mg Oral Dinner    sodium chloride flush  10 mL IntraVENous 2 times per day     PRN Meds: GI cocktail, oxyCODONE, medicated lip balm, hydrOXYzine, diphenhydrAMINE, LORazepam, albuterol, zolpidem, sodium chloride flush, sodium chloride, promethazine **OR** ondansetron    Labs:       Radiology:     +++++++++++++++++++++++++++++++++++++++++++++++++  DO Burt Ely[address]  +++++++++++++++++++++++++++++++++++++++++++++++++  NOTE: This report was transcribed using voice recognition software. Every effort was made to ensure accuracy; however, inadvertent computerized transcription errors may be present.

## 2022-04-23 NOTE — PLAN OF CARE
Problem: Skin Integrity:  Goal: Will show no infection signs and symptoms  4/23/2022 1548 by Lora Zaldivar RN  Outcome: Progressing  4/23/2022 0949 by Lora Zaldivar RN  Outcome: Progressing  Goal: Absence of new skin breakdown  4/23/2022 1548 by Lora Zaldivar RN  Outcome: Progressing  4/23/2022 0949 by Lora Zaldivar RN  Outcome: Progressing  Goal: Risk for impaired skin integrity will decrease  4/23/2022 1548 by Lora Zaldivar RN  Outcome: Progressing  4/23/2022 0949 by Lora Zaldivar RN  Outcome: Progressing  Goal: Demonstration of wound healing without infection will improve  4/23/2022 1548 by Lora Zaldivar RN  Outcome: Progressing  4/23/2022 0949 by Lora Zaldivar RN  Outcome: Progressing  Goal: Complications related to intravenous access or infusion will be avoided or minimized  4/23/2022 1548 by Lora Zaldivar RN  Outcome: Progressing  4/23/2022 0949 by Lora Zaldivar RN  Outcome: Progressing     Problem: Activity:  Goal: Ability to avoid complications of mobility impairment will improve  4/23/2022 1548 by Lora Zaldivar RN  Outcome: Progressing  4/23/2022 0949 by Lora Zaldivar RN  Outcome: Progressing  Goal: Ability to tolerate increased activity will improve  4/23/2022 1548 by Lora Zaldivar RN  Outcome: Progressing  4/23/2022 0949 by Lora Zaldivar RN  Outcome: Progressing     Problem:  Bowel/Gastric:  Goal: Gastrointestinal status for postoperative course will improve  4/23/2022 1548 by Lora Zaldivar RN  Outcome: Progressing  4/23/2022 0949 by Lora Zaldivar RN  Outcome: Progressing     Problem: Fluid Volume:  Goal: Maintenance of adequate hydration will improve  4/23/2022 1548 by Lora Zaldivar RN  Outcome: Progressing  4/23/2022 0949 by Lora Zaldivar RN  Outcome: Progressing     Problem: Health Behavior:  Goal: Identification of resources available to assist in meeting health care needs will improve  4/23/2022 1548 by Brook Frias RN  Outcome: Progressing  4/23/2022 0949 by Brook Frias RN  Outcome: Progressing  Goal: Ability to state signs and symptoms to report to health care provider will improve  4/23/2022 1548 by Brook Frias RN  Outcome: Progressing  4/23/2022 0949 by Brook Frias RN  Outcome: Progressing     Problem: Nutritional:  Goal: Ability to attain and maintain optimal nutritional status will improve  4/23/2022 1548 by Brook Frias RN  Outcome: Progressing  4/23/2022 0949 by Brook Frias RN  Outcome: Progressing  Goal: Nutritional status will improve  4/23/2022 1548 by Brook Frias RN  Outcome: Progressing  4/23/2022 0949 by Brook Frias RN  Outcome: Progressing     Problem: Physical Regulation:  Goal: Ability to maintain clinical measurements within normal limits will improve  4/23/2022 1548 by Brook Frias RN  Outcome: Progressing  4/23/2022 0949 by Brook Frias RN  Outcome: Progressing  Goal: Will remain free from infection  4/23/2022 1548 by Brook Frias RN  Outcome: Progressing  4/23/2022 0949 by Brook Frias RN  Outcome: Progressing  Goal: Diagnostic test results will improve  4/23/2022 1548 by Brook Frias RN  Outcome: Progressing  4/23/2022 0949 by Brook Frias RN  Outcome: Progressing  Goal: Ability to maintain vital signs within normal range will improve  4/23/2022 1548 by Brook Frias RN  Outcome: Progressing  4/23/2022 0949 by Brook Frias RN  Outcome: Progressing     Problem: Respiratory:  Goal: Respiratory status will improve  4/23/2022 1548 by Brook Frias RN  Outcome: Progressing  4/23/2022 0949 by Brook Frias RN  Outcome: Progressing  Goal: Ability to maintain normal respiratory secretions will improve  4/23/2022 1548 by Brook Frias RN  Outcome: Progressing  4/23/2022 0949 by Brook Frias RN  Outcome: Progressing     Problem: Sensory:  Goal: Pain level will decrease  4/23/2022 1548 by Lanette Nino RN  Outcome: Progressing  4/23/2022 0949 by Lanette Nino RN  Outcome: Progressing  Goal: Satisfaction with pain management regimen will improve  4/23/2022 1548 by Lanette Nino RN  Outcome: Progressing  4/23/2022 0949 by Lanette Nino RN  Outcome: Progressing     Problem: Urinary Elimination:  Goal: Ability to achieve and maintain adequate urine output will improve  4/23/2022 1548 by Lanette Nino RN  Outcome: Progressing  4/23/2022 0949 by Lanette Nino RN  Outcome: Progressing     Problem: Falls - Risk of:  Goal: Will remain free from falls  4/23/2022 1548 by Lanette Nino RN  Outcome: Progressing  4/23/2022 0949 by Lanette Nino RN  Outcome: Progressing  Goal: Absence of physical injury  4/23/2022 1548 by Lanette Nino RN  Outcome: Progressing  4/23/2022 0949 by Lanette Nino RN  Outcome: Progressing     Problem: Infection - Central Venous Catheter-Associated Bloodstream Infection:  Goal: Will show no infection signs and symptoms  4/23/2022 1548 by Lanette Nino RN  Outcome: Progressing  4/23/2022 0949 by Lanette Nino RN  Outcome: Progressing     Problem: Pain:  Goal: Pain level will decrease  4/23/2022 1548 by Lanette Nino RN  Outcome: Progressing  4/23/2022 0949 by Lanette Nino RN  Outcome: Progressing  Goal: Control of acute pain  4/23/2022 1548 by Lanette Nino RN  Outcome: Progressing  4/23/2022 0949 by Lanette Nino RN  Outcome: Progressing  Goal: Control of chronic pain  4/23/2022 1548 by Lanette Nino RN  Outcome: Progressing  4/23/2022 0949 by Lanette Nino RN  Outcome: Progressing     Problem: Chronic Conditions and Co-morbidities  Goal: Patient's chronic conditions and co-morbidity symptoms are monitored and maintained or improved  4/23/2022 1548 by Lanette Nino RN  Outcome: Progressing  4/23/2022 0949 by Lanette Nino RN  Outcome: Progressing     Problem: Discharge Planning  Goal: Discharge to home or other facility with appropriate resources  4/23/2022 1548 by Hetal eCrvantes RN  Outcome: Progressing  4/23/2022 0949 by Hetal Cervantes RN  Outcome: Progressing

## 2022-04-23 NOTE — PLAN OF CARE
Problem: Skin Integrity:  Goal: Will show no infection signs and symptoms  Outcome: Progressing  Goal: Absence of new skin breakdown  Outcome: Progressing  Goal: Risk for impaired skin integrity will decrease  Outcome: Progressing  Goal: Demonstration of wound healing without infection will improve  Outcome: Progressing  Goal: Complications related to intravenous access or infusion will be avoided or minimized  Outcome: Progressing     Problem: Activity:  Goal: Ability to avoid complications of mobility impairment will improve  Outcome: Progressing  Goal: Ability to tolerate increased activity will improve  Outcome: Progressing     Problem:  Bowel/Gastric:  Goal: Gastrointestinal status for postoperative course will improve  Outcome: Progressing     Problem: Fluid Volume:  Goal: Maintenance of adequate hydration will improve  Outcome: Progressing     Problem: Health Behavior:  Goal: Identification of resources available to assist in meeting health care needs will improve  Outcome: Progressing  Goal: Ability to state signs and symptoms to report to health care provider will improve  Outcome: Progressing     Problem: Nutritional:  Goal: Ability to attain and maintain optimal nutritional status will improve  Outcome: Progressing  Goal: Nutritional status will improve  Outcome: Progressing     Problem: Physical Regulation:  Goal: Ability to maintain clinical measurements within normal limits will improve  Outcome: Progressing  Goal: Will remain free from infection  Outcome: Progressing  Goal: Diagnostic test results will improve  Outcome: Progressing  Goal: Ability to maintain vital signs within normal range will improve  Outcome: Progressing     Problem: Respiratory:  Goal: Respiratory status will improve  Outcome: Progressing  Goal: Ability to maintain normal respiratory secretions will improve  Outcome: Progressing     Problem: Sensory:  Goal: Pain level will decrease  Outcome: Progressing  Goal: Satisfaction with pain management regimen will improve  Outcome: Progressing     Problem: Urinary Elimination:  Goal: Ability to achieve and maintain adequate urine output will improve  Outcome: Progressing     Problem: Falls - Risk of:  Goal: Will remain free from falls  Outcome: Progressing  Goal: Absence of physical injury  Outcome: Progressing     Problem: Infection - Central Venous Catheter-Associated Bloodstream Infection:  Goal: Will show no infection signs and symptoms  Outcome: Progressing     Problem: Pain:  Goal: Pain level will decrease  Outcome: Progressing  Goal: Control of acute pain  Outcome: Progressing  Goal: Control of chronic pain  Outcome: Progressing     Problem: Chronic Conditions and Co-morbidities  Goal: Patient's chronic conditions and co-morbidity symptoms are monitored and maintained or improved  Outcome: Progressing     Problem: Discharge Planning  Goal: Discharge to home or other facility with appropriate resources  Outcome: Progressing

## 2022-04-24 PROCEDURE — 6370000000 HC RX 637 (ALT 250 FOR IP): Performed by: NURSE PRACTITIONER

## 2022-04-24 PROCEDURE — 6370000000 HC RX 637 (ALT 250 FOR IP): Performed by: INTERNAL MEDICINE

## 2022-04-24 PROCEDURE — 6370000000 HC RX 637 (ALT 250 FOR IP): Performed by: NEUROLOGICAL SURGERY

## 2022-04-24 PROCEDURE — 99231 SBSQ HOSP IP/OBS SF/LOW 25: CPT

## 2022-04-24 PROCEDURE — 1200000000 HC SEMI PRIVATE

## 2022-04-24 PROCEDURE — 2580000003 HC RX 258: Performed by: NEUROLOGICAL SURGERY

## 2022-04-24 PROCEDURE — 6360000002 HC RX W HCPCS: Performed by: NEUROLOGICAL SURGERY

## 2022-04-24 RX ORDER — SUCRALFATE 1 G/1
1 TABLET ORAL EVERY 6 HOURS SCHEDULED
Status: DISCONTINUED | OUTPATIENT
Start: 2022-04-24 | End: 2022-04-27 | Stop reason: HOSPADM

## 2022-04-24 RX ORDER — PANTOPRAZOLE SODIUM 40 MG/1
40 TABLET, DELAYED RELEASE ORAL
Status: DISCONTINUED | OUTPATIENT
Start: 2022-04-25 | End: 2022-04-27 | Stop reason: HOSPADM

## 2022-04-24 RX ADMIN — OXYCODONE HYDROCHLORIDE 15 MG: 15 TABLET ORAL at 15:06

## 2022-04-24 RX ADMIN — APIXABAN 5 MG: 5 TABLET, FILM COATED ORAL at 08:38

## 2022-04-24 RX ADMIN — Medication 10 ML: at 08:38

## 2022-04-24 RX ADMIN — SENNOSIDES AND DOCUSATE SODIUM 2 TABLET: 50; 8.6 TABLET ORAL at 21:26

## 2022-04-24 RX ADMIN — METOPROLOL SUCCINATE 50 MG: 50 TABLET, EXTENDED RELEASE ORAL at 08:38

## 2022-04-24 RX ADMIN — 0.12% CHLORHEXIDINE GLUCONATE 15 ML: 1.2 RINSE ORAL at 21:26

## 2022-04-24 RX ADMIN — GABAPENTIN 300 MG: 300 CAPSULE ORAL at 21:26

## 2022-04-24 RX ADMIN — DEXAMETHASONE 4 MG: 4 TABLET ORAL at 08:39

## 2022-04-24 RX ADMIN — CELECOXIB 100 MG: 100 CAPSULE ORAL at 21:26

## 2022-04-24 RX ADMIN — ZOLPIDEM TARTRATE 10 MG: 5 TABLET ORAL at 23:51

## 2022-04-24 RX ADMIN — OXYCODONE HYDROCHLORIDE 15 MG: 15 TABLET ORAL at 10:30

## 2022-04-24 RX ADMIN — PANTOPRAZOLE SODIUM 40 MG: 40 TABLET, DELAYED RELEASE ORAL at 06:11

## 2022-04-24 RX ADMIN — GABAPENTIN 300 MG: 300 CAPSULE ORAL at 08:38

## 2022-04-24 RX ADMIN — DEXAMETHASONE 4 MG: 4 TABLET ORAL at 21:26

## 2022-04-24 RX ADMIN — Medication 10 ML: at 19:42

## 2022-04-24 RX ADMIN — SUCRALFATE 1 G: 1 TABLET ORAL at 18:23

## 2022-04-24 RX ADMIN — PRAVASTATIN SODIUM 20 MG: 20 TABLET ORAL at 16:26

## 2022-04-24 RX ADMIN — OXYCODONE HYDROCHLORIDE 15 MG: 15 TABLET ORAL at 23:46

## 2022-04-24 RX ADMIN — MONTELUKAST SODIUM 10 MG: 10 TABLET ORAL at 21:26

## 2022-04-24 RX ADMIN — OXYCODONE HYDROCHLORIDE 15 MG: 15 TABLET ORAL at 06:11

## 2022-04-24 RX ADMIN — METOPROLOL SUCCINATE 50 MG: 50 TABLET, EXTENDED RELEASE ORAL at 21:26

## 2022-04-24 RX ADMIN — SUCRALFATE 1 G: 1 TABLET ORAL at 23:46

## 2022-04-24 RX ADMIN — CELECOXIB 100 MG: 100 CAPSULE ORAL at 08:38

## 2022-04-24 RX ADMIN — GABAPENTIN 300 MG: 300 CAPSULE ORAL at 15:07

## 2022-04-24 RX ADMIN — OXYCODONE HYDROCHLORIDE 15 MG: 15 TABLET ORAL at 19:40

## 2022-04-24 RX ADMIN — 0.12% CHLORHEXIDINE GLUCONATE 15 ML: 1.2 RINSE ORAL at 08:39

## 2022-04-24 RX ADMIN — APIXABAN 5 MG: 5 TABLET, FILM COATED ORAL at 21:26

## 2022-04-24 ASSESSMENT — PAIN - FUNCTIONAL ASSESSMENT
PAIN_FUNCTIONAL_ASSESSMENT: PREVENTS OR INTERFERES SOME ACTIVE ACTIVITIES AND ADLS

## 2022-04-24 ASSESSMENT — PAIN DESCRIPTION - DESCRIPTORS
DESCRIPTORS: ACHING;DISCOMFORT;THROBBING
DESCRIPTORS: SHARP;STABBING;DISCOMFORT
DESCRIPTORS: SHARP;STABBING
DESCRIPTORS: ACHING;DISCOMFORT;THROBBING
DESCRIPTORS: ACHING;DISCOMFORT;THROBBING
DESCRIPTORS: ACHING;DISCOMFORT

## 2022-04-24 ASSESSMENT — PAIN DESCRIPTION - PAIN TYPE
TYPE: ACUTE PAIN;SURGICAL PAIN
TYPE: ACUTE PAIN;SURGICAL PAIN
TYPE: CHRONIC PAIN
TYPE: ACUTE PAIN;SURGICAL PAIN
TYPE: ACUTE PAIN;SURGICAL PAIN
TYPE: CHRONIC PAIN

## 2022-04-24 ASSESSMENT — PAIN DESCRIPTION - ORIENTATION
ORIENTATION: MID

## 2022-04-24 ASSESSMENT — PAIN DESCRIPTION - FREQUENCY
FREQUENCY: CONTINUOUS

## 2022-04-24 ASSESSMENT — PAIN DESCRIPTION - LOCATION
LOCATION: BACK
LOCATION: BACK
LOCATION: BACK;NECK
LOCATION: BACK

## 2022-04-24 ASSESSMENT — PAIN SCALES - GENERAL
PAINLEVEL_OUTOF10: 3
PAINLEVEL_OUTOF10: 4
PAINLEVEL_OUTOF10: 4
PAINLEVEL_OUTOF10: 10
PAINLEVEL_OUTOF10: 8
PAINLEVEL_OUTOF10: 8
PAINLEVEL_OUTOF10: 4
PAINLEVEL_OUTOF10: 8
PAINLEVEL_OUTOF10: 10

## 2022-04-24 ASSESSMENT — PAIN DESCRIPTION - ONSET
ONSET: ON-GOING

## 2022-04-24 NOTE — PLAN OF CARE
Problem: Skin Integrity:  Goal: Will show no infection signs and symptoms  4/24/2022 1559 by Jon Baldwin RN  Outcome: Progressing  4/24/2022 0951 by Jon Baldwin RN  Outcome: Progressing  Goal: Absence of new skin breakdown  4/24/2022 1559 by Jon Baldwin RN  Outcome: Progressing  4/24/2022 0951 by Jon Baldwin RN  Outcome: Progressing  Goal: Risk for impaired skin integrity will decrease  4/24/2022 1559 by Jon Baldwin RN  Outcome: Progressing  4/24/2022 0951 by Jon Baldwin RN  Outcome: Progressing  Goal: Demonstration of wound healing without infection will improve  4/24/2022 1559 by Jon Baldwin RN  Outcome: Progressing  4/24/2022 0951 by Jon Baldwin RN  Outcome: Progressing  Goal: Complications related to intravenous access or infusion will be avoided or minimized  4/24/2022 1559 by Jon Baldwin RN  Outcome: Progressing  4/24/2022 0951 by Jon Baldwin RN  Outcome: Progressing     Problem: Activity:  Goal: Ability to avoid complications of mobility impairment will improve  4/24/2022 1559 by Jon Baldwin RN  Outcome: Progressing  4/24/2022 0951 by Jon Baldwin RN  Outcome: Progressing  Goal: Ability to tolerate increased activity will improve  4/24/2022 1559 by Jon Baldwin RN  Outcome: Progressing  4/24/2022 0951 by Jon Baldwin RN  Outcome: Progressing     Problem:  Bowel/Gastric:  Goal: Gastrointestinal status for postoperative course will improve  4/24/2022 1559 by Jon Baldwin RN  Outcome: Progressing  4/24/2022 0951 by Jon Baldwin RN  Outcome: Progressing     Problem: Fluid Volume:  Goal: Maintenance of adequate hydration will improve  4/24/2022 1559 by Jon Baldwin RN  Outcome: Progressing  4/24/2022 0951 by Jon Baldwin RN  Outcome: Progressing     Problem: Health Behavior:  Goal: Identification of resources available to assist in meeting health care needs will improve  4/24/2022 1559 by Hilda Rey RN  Outcome: Progressing  4/24/2022 0951 by Hilda Rey RN  Outcome: Progressing  Goal: Ability to state signs and symptoms to report to health care provider will improve  4/24/2022 1559 by Hilda Rey RN  Outcome: Progressing  4/24/2022 0951 by Hilda Rey RN  Outcome: Progressing     Problem: Nutritional:  Goal: Ability to attain and maintain optimal nutritional status will improve  4/24/2022 1559 by Hilda Rey RN  Outcome: Progressing  4/24/2022 0951 by Hilda Rey RN  Outcome: Progressing  Goal: Nutritional status will improve  4/24/2022 1559 by Hilda Rey RN  Outcome: Progressing  4/24/2022 0951 by Hilda Rey RN  Outcome: Progressing     Problem: Physical Regulation:  Goal: Ability to maintain clinical measurements within normal limits will improve  4/24/2022 1559 by Hilda Rey RN  Outcome: Progressing  4/24/2022 0951 by Hilda Rey RN  Outcome: Progressing  Goal: Will remain free from infection  4/24/2022 1559 by Hilda Rey RN  Outcome: Progressing  4/24/2022 0951 by Hilda Rey RN  Outcome: Progressing  Goal: Diagnostic test results will improve  4/24/2022 1559 by Hilda Rey RN  Outcome: Progressing  4/24/2022 0951 by Hilda Rey RN  Outcome: Progressing  Goal: Ability to maintain vital signs within normal range will improve  4/24/2022 1559 by Hilda Rey RN  Outcome: Progressing  4/24/2022 0951 by Hilda Rey RN  Outcome: Progressing     Problem: Respiratory:  Goal: Respiratory status will improve  4/24/2022 1559 by Hilda eRy RN  Outcome: Progressing  4/24/2022 0951 by Hilda Rey RN  Outcome: Progressing  Goal: Ability to maintain normal respiratory secretions will improve  4/24/2022 1559 by Hilda Rey RN  Outcome: Progressing  4/24/2022 0951 by Hilda Rey RN  Outcome: Progressing     Problem: Sensory:  Goal: Pain level will decrease  4/24/2022 1559 by Lora Zaldivar RN  Outcome: Progressing  4/24/2022 0951 by Lora Zaldivar RN  Outcome: Progressing  Goal: Satisfaction with pain management regimen will improve  4/24/2022 1559 by Lora Zaldivar RN  Outcome: Progressing  4/24/2022 0951 by Lora Zaldivar RN  Outcome: Progressing     Problem: Urinary Elimination:  Goal: Ability to achieve and maintain adequate urine output will improve  4/24/2022 1559 by Lora Zaldivar RN  Outcome: Progressing  4/24/2022 0951 by Lora Zaldivar RN  Outcome: Progressing     Problem: Falls - Risk of:  Goal: Will remain free from falls  4/24/2022 1559 by Lora Zaldivar RN  Outcome: Progressing  4/24/2022 0951 by Lora Zaldivar RN  Outcome: Progressing  Goal: Absence of physical injury  4/24/2022 1559 by Lora Zaldivar RN  Outcome: Progressing  4/24/2022 0951 by Lora Zaldivar RN  Outcome: Progressing     Problem: Infection - Central Venous Catheter-Associated Bloodstream Infection:  Goal: Will show no infection signs and symptoms  4/24/2022 1559 by Lora Zaldivar RN  Outcome: Progressing  4/24/2022 0951 by Lora Zaldivar RN  Outcome: Progressing     Problem: Pain:  Goal: Pain level will decrease  4/24/2022 1559 by Lora Zaldivar RN  Outcome: Progressing  4/24/2022 0951 by Lora Zaldivar RN  Outcome: Progressing  Goal: Control of acute pain  4/24/2022 1559 by Lora Zaldivar RN  Outcome: Progressing  4/24/2022 0951 by Lora Zaldivar RN  Outcome: Progressing  Goal: Control of chronic pain  4/24/2022 1559 by Lora Zaldivar RN  Outcome: Progressing  4/24/2022 0951 by Lora Zaldivar RN  Outcome: Progressing     Problem: Chronic Conditions and Co-morbidities  Goal: Patient's chronic conditions and co-morbidity symptoms are monitored and maintained or improved  4/24/2022 1559 by Lora Zaldivar RN  Outcome: Progressing  4/24/2022 0951 by Lora Zaldivar RN  Outcome: Progressing     Problem: Discharge Planning  Goal: Discharge to home or other facility with appropriate resources  4/24/2022 1559 by Ashley Camargo RN  Outcome: Progressing  4/24/2022 0951 by Ashley Camargo RN  Outcome: Progressing

## 2022-04-24 NOTE — PROGRESS NOTES
[address] [facility]                Phone: [phone]   Fax: [phone]    Physical Therapy   Date:  2022    Patient Name:  James Lesch    :  1955  MRN: [id number]    Room #:  3677/5164-C    Physical therapy treatment attempted however could not be completed at this time due to:    Pt politely refusing today. She reports that her back is very sore and she is tired this morning. Pt was very thankful for the PT checking on her. Will attempt again when pt is able to participate with therapy. Thank you kindly for the opportunity to assist in the care of this pt.       Fabian Cavazos, STELLA  [id number]

## 2022-04-24 NOTE — PROGRESS NOTES
Hospitalist Progress Note      SYNOPSIS: Patient admitted on 4/8/2022 for Cord compression [facility])      SUBJECTIVE  In good spirits  persists \"heartburn\" receiving GI cocktail  Denies emesis denies diarrhea denies difficulty voiding  Den CP or SOB  Den constipation    OBJECTIVE:/74   Pulse 78   Temp 98.4 °F (36.9 °C) (Temporal)   Resp 16   Ht 5' (1.524 m)   Wt 172 lb (78 kg)   SpO2 100%   BMI 33.59 kg/m²   General appearance: Not diaphoretic. Not flushed  HEENT: No thrush no obv swelling to lips/tongue    Respiratory: No wheezing on  auscultation   cardiovascular: Pulse reg audible S1-S2    Neurologic: awake, alert and attentive  Psych= pleasant in good spirits mood and affect match  Derm= no hives or petechiae no papules    Sacral region intact  ASSESSMENT/  PLAN:  For \"heartburn symptoms =    increase ppi to bid   add carafte 1 gm po qid  If symptoms do not improve may wish to   check ugi- for now hold off on ugi    Not sure this would fit  candid esophagitis as she is not experiencing   odynophagia  Depends on symptoms may need egd  No labs since 4/18   labs in am surveillance  Contin with rad rx and pt and pain control   anticipated 2 more treatments next mond and tuesd   once completed move forward with anticipated diacharge  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  Palliative care consulted for symptoms management.   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: on eliquis, r  Urinary tract infection:  Urine culture + Escherichia coli sensitive to current Tx, completed course of antibiotics   Hypertension: on troprol XL controlled  COPD: c/w breathing treatment   Hyponatremia: corrected, Held hydrochlorothiazide continue to hold this as blood pressures currently in acceptable range  Hypokalemia: corrected  Anxiety: she is using Ativan as needed        DISPOSITION: patient desires to return home once this round of  treatments cycle   Completed on tuesday    Medications:  REVIEWED DAILY    Infusion Medications    sodium chloride       Scheduled Medications    fentaNYL  1 patch TransDERmal Q72H    celecoxib  100 mg Oral BID    chlorhexidine  15 mL Mouth/Throat BID    apixaban  5 mg Oral BID    polyethylene glycol  17 g Oral Daily    sennosides-docusate sodium  2 tablet Oral Nightly    gabapentin  300 mg Oral TID    dexamethasone  4 mg Oral 2 times per day    pantoprazole  40 mg Oral QAM AC    metoprolol succinate  50 mg Oral BID    montelukast  10 mg Oral Nightly    pravastatin  20 mg Oral Dinner    sodium chloride flush  10 mL IntraVENous 2 times per day     PRN Meds: GI cocktail, oxyCODONE, medicated lip balm, hydrOXYzine, diphenhydrAMINE, LORazepam, albuterol, zolpidem, sodium chloride flush, sodium chloride, promethazine **OR** ondansetron    Labs:       Radiology:     +++++++++++++++++++++++++++++++++++++++++++++++++  Jorge Luis Mckenzie DO  [address]  +++++++++++++++++++++++++++++++++++++++++++++++++  NOTE: This report was transcribed using voice recognition software. Every effort was made to ensure accuracy; however, inadvertent computerized transcription errors may be present.

## 2022-04-24 NOTE — PROGRESS NOTES
Palliative Care Department  [phone]  Palliative Care Progress Note  Provider Raquel Mayer, KATHIE - CNP      PATIENT: Gray Stafford  : 1955  MRN: [id number]  ADMISSION DATE: 2022  7:12 PM  Referring Provider: Jessica Arndt MD    Palliative Medicine was consulted on hospital day 16 for assistance with Goals of care, Symptom management     HPI:     Chhaya Nguyễn is a 61 y.o. y/o female with a history of static breast cancer, anxiety, COPD, depression, diabetes mellitus, hypertension, restless leg syndrome who presented to [facility]) on 2022 as a transfer from [facility] for a neurosurgery consultation due to cord compression at T9 from extensive metastases disease to  thoracic and lumbar spine. After have been evaluated by neurosurgery, patient is not interested in surgical intervention, and off-the-shelf TLSO brace was recommended. 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 every 4 hours as needed  o Hydroxyzine 25 mg 3 times daily as needed   Pain due to neoplasm  o Fentanyl 25 mcg patch every 72 hours  o gabapentin 300 mg 3 times daily  o Oxy IR 15mg every 4 hours as needed    Palliative Care Encounter / Counseling Regarding Goals of Care  Please see detailed goals of care discussion as below   At this time, Gray Stafford, Does have capacity for medical decision-making. Capacity is time limited and situation/question specific   Outcome of goals of care meeting:  o Continue with current medical treatment  o Wishes for CODE STATUS to be changed to limited DNR CCA DNI  o Does not want aggressive surgical intervention, but is hoping for conservative treatment.     Code status Limited DNR CCA DNI   Advanced Directives: no POA or living will in [facility] NOK:    Spiritual assessment: no spiritual distress identified  Bereavement and grief: to be determined  Referrals to: none today    Thank you for the opportunity to participate in the care of Abigail Alvarez. KATHIE Cortez - CNP  Palliative Medicine     SUBJECTIVE:     Details of Conversation:      Met with patient at the bedside. No family members present. Patient stated that her pain has been well under control with current regimen. She state that she feels much better today. She was no new concerns. We will continue to follow. Prognosis: Guarded    OBJECTIVE:     /66   Pulse 82   Temp 97.7 °F (36.5 °C)   Resp 18   Ht 5' (1.524 m)   Wt 172 lb (78 kg)   SpO2 98%   BMI 33.59 kg/m²     Physical Examination:  Gen: NAD, awake, alert   HEENT: normocephalic, atraumatic, PERRL, EOMI,   Neck: trachea midline, no JVD  Lungs: respirations easy and not labored,   Heart: regular rate and rhythm, distant heart tones,   Abdomen: normoactive bowel sounds, soft, non-tender  Extremities: no clubbing, cyanosis or edema, moving all extremities    Skin: warm, dry without rashes, lesions, bruising  Neuro: awake, alert, oriented x 3, follows commands, no gross neurologic deficit     Objective data reviewed: labs, images, records, medication use, vitals and chart    Time/Communication  Greater than 50% of time spent, total 15 minutes in counseling and coordination of care at the bedside regarding goals of care and symptom management. Thank you for allowing Palliative Medicine to participate in the care of Abigail Alvarez. Note: This report was completed using [employer] voiced recognition software. Every effort has been made to ensure accuracy; however, inadvertent computerized transcription errors may be present.

## 2022-04-25 ENCOUNTER — HOSPITAL ENCOUNTER (OUTPATIENT)
Dept: RADIATION ONCOLOGY | Age: 67
Discharge: HOME | End: 2022-04-25
Attending: RADIOLOGY
Payer: COMMERCIAL

## 2022-04-25 LAB
ANION GAP SERPL CALCULATED.3IONS-SCNC: 7 MMOL/L (ref 7–16)
BUN BLDV-MCNC: 20 MG/DL (ref 6–23)
CALCIUM SERPL-MCNC: 8.4 MG/DL (ref 8.6–10.2)
CHLORIDE BLD-SCNC: 104 MMOL/L (ref 98–107)
CO2: 27 MMOL/L (ref 22–29)
CREAT SERPL-MCNC: 0.5 MG/DL (ref 0.5–1)
GFR AFRICAN AMERICAN: >60
GFR NON-AFRICAN AMERICAN: >60 ML/MIN/1.73
GLUCOSE BLD-MCNC: 130 MG/DL (ref 74–99)
HCT VFR BLD CALC: 34.3 % (ref 34–48)
HEMOGLOBIN: 10.7 G/DL (ref 11.5–15.5)
MCH RBC QN AUTO: 28.6 PG (ref 26–35)
MCHC RBC AUTO-ENTMCNC: 31.2 % (ref 32–34.5)
MCV RBC AUTO: 91.7 FL (ref 80–99.9)
PDW BLD-RTO: 16.5 FL (ref 11.5–15)
PLATELET # BLD: 187 E9/L (ref 130–450)
PMV BLD AUTO: 9 FL (ref 7–12)
POTASSIUM SERPL-SCNC: 4.7 MMOL/L (ref 3.5–5)
RBC # BLD: 3.74 E12/L (ref 3.5–5.5)
SODIUM BLD-SCNC: 138 MMOL/L (ref 132–146)
WBC # BLD: 7.5 E9/L (ref 4.5–11.5)

## 2022-04-25 PROCEDURE — 85027 COMPLETE CBC AUTOMATED: CPT

## 2022-04-25 PROCEDURE — 6370000000 HC RX 637 (ALT 250 FOR IP): Performed by: NEUROLOGICAL SURGERY

## 2022-04-25 PROCEDURE — 80048 BASIC METABOLIC PNL TOTAL CA: CPT

## 2022-04-25 PROCEDURE — 36415 COLL VENOUS BLD VENIPUNCTURE: CPT

## 2022-04-25 PROCEDURE — 2580000003 HC RX 258: Performed by: NEUROLOGICAL SURGERY

## 2022-04-25 PROCEDURE — 77387 GUIDANCE FOR RADJ TX DLVR: CPT | Performed by: RADIOLOGY

## 2022-04-25 PROCEDURE — 77014 PR CT GUIDANCE PLACEMENT RAD THERAPY FIELDS: CPT | Performed by: RADIOLOGY

## 2022-04-25 PROCEDURE — 6370000000 HC RX 637 (ALT 250 FOR IP): Performed by: INTERNAL MEDICINE

## 2022-04-25 PROCEDURE — 6370000000 HC RX 637 (ALT 250 FOR IP): Performed by: NURSE PRACTITIONER

## 2022-04-25 PROCEDURE — 6360000002 HC RX W HCPCS: Performed by: NEUROLOGICAL SURGERY

## 2022-04-25 PROCEDURE — 97530 THERAPEUTIC ACTIVITIES: CPT

## 2022-04-25 PROCEDURE — 1200000000 HC SEMI PRIVATE

## 2022-04-25 PROCEDURE — 36591 DRAW BLOOD OFF VENOUS DEVICE: CPT

## 2022-04-25 PROCEDURE — 99231 SBSQ HOSP IP/OBS SF/LOW 25: CPT

## 2022-04-25 PROCEDURE — 97535 SELF CARE MNGMENT TRAINING: CPT

## 2022-04-25 PROCEDURE — 77412 RADIATION TX DELIVERY LVL 3: CPT | Performed by: RADIOLOGY

## 2022-04-25 RX ADMIN — MONTELUKAST SODIUM 10 MG: 10 TABLET ORAL at 21:58

## 2022-04-25 RX ADMIN — CELECOXIB 100 MG: 100 CAPSULE ORAL at 21:57

## 2022-04-25 RX ADMIN — GABAPENTIN 300 MG: 300 CAPSULE ORAL at 08:48

## 2022-04-25 RX ADMIN — SENNOSIDES AND DOCUSATE SODIUM 2 TABLET: 50; 8.6 TABLET ORAL at 21:57

## 2022-04-25 RX ADMIN — OXYCODONE HYDROCHLORIDE 15 MG: 15 TABLET ORAL at 15:06

## 2022-04-25 RX ADMIN — SUCRALFATE 1 G: 1 TABLET ORAL at 23:11

## 2022-04-25 RX ADMIN — APIXABAN 5 MG: 5 TABLET, FILM COATED ORAL at 21:57

## 2022-04-25 RX ADMIN — SUCRALFATE 1 G: 1 TABLET ORAL at 13:05

## 2022-04-25 RX ADMIN — GABAPENTIN 300 MG: 300 CAPSULE ORAL at 13:05

## 2022-04-25 RX ADMIN — METOPROLOL SUCCINATE 50 MG: 50 TABLET, EXTENDED RELEASE ORAL at 21:58

## 2022-04-25 RX ADMIN — PRAVASTATIN SODIUM 20 MG: 20 TABLET ORAL at 17:53

## 2022-04-25 RX ADMIN — OXYCODONE HYDROCHLORIDE 15 MG: 15 TABLET ORAL at 05:34

## 2022-04-25 RX ADMIN — PANTOPRAZOLE SODIUM 40 MG: 40 TABLET, DELAYED RELEASE ORAL at 17:53

## 2022-04-25 RX ADMIN — APIXABAN 5 MG: 5 TABLET, FILM COATED ORAL at 08:48

## 2022-04-25 RX ADMIN — SUCRALFATE 1 G: 1 TABLET ORAL at 05:34

## 2022-04-25 RX ADMIN — 0.12% CHLORHEXIDINE GLUCONATE 15 ML: 1.2 RINSE ORAL at 21:58

## 2022-04-25 RX ADMIN — DEXAMETHASONE 4 MG: 4 TABLET ORAL at 21:57

## 2022-04-25 RX ADMIN — CELECOXIB 100 MG: 100 CAPSULE ORAL at 08:48

## 2022-04-25 RX ADMIN — OXYCODONE HYDROCHLORIDE 15 MG: 15 TABLET ORAL at 09:57

## 2022-04-25 RX ADMIN — ZOLPIDEM TARTRATE 10 MG: 5 TABLET ORAL at 23:11

## 2022-04-25 RX ADMIN — METOPROLOL SUCCINATE 50 MG: 50 TABLET, EXTENDED RELEASE ORAL at 08:48

## 2022-04-25 RX ADMIN — GABAPENTIN 300 MG: 300 CAPSULE ORAL at 21:57

## 2022-04-25 RX ADMIN — OXYCODONE HYDROCHLORIDE 15 MG: 15 TABLET ORAL at 23:11

## 2022-04-25 RX ADMIN — OXYCODONE HYDROCHLORIDE 15 MG: 15 TABLET ORAL at 19:41

## 2022-04-25 RX ADMIN — SUCRALFATE 1 G: 1 TABLET ORAL at 17:53

## 2022-04-25 RX ADMIN — Medication 10 ML: at 21:58

## 2022-04-25 RX ADMIN — Medication 10 ML: at 08:50

## 2022-04-25 RX ADMIN — DEXAMETHASONE 4 MG: 4 TABLET ORAL at 08:48

## 2022-04-25 RX ADMIN — 0.12% CHLORHEXIDINE GLUCONATE 15 ML: 1.2 RINSE ORAL at 08:48

## 2022-04-25 RX ADMIN — PANTOPRAZOLE SODIUM 40 MG: 40 TABLET, DELAYED RELEASE ORAL at 06:57

## 2022-04-25 ASSESSMENT — PAIN SCALES - GENERAL
PAINLEVEL_OUTOF10: 8
PAINLEVEL_OUTOF10: 10
PAINLEVEL_OUTOF10: 7
PAINLEVEL_OUTOF10: 8
PAINLEVEL_OUTOF10: 4
PAINLEVEL_OUTOF10: 3
PAINLEVEL_OUTOF10: 5
PAINLEVEL_OUTOF10: 4
PAINLEVEL_OUTOF10: 0
PAINLEVEL_OUTOF10: 3
PAINLEVEL_OUTOF10: 0

## 2022-04-25 ASSESSMENT — PAIN DESCRIPTION - FREQUENCY
FREQUENCY: INTERMITTENT
FREQUENCY: CONTINUOUS
FREQUENCY: INTERMITTENT

## 2022-04-25 ASSESSMENT — PAIN DESCRIPTION - DESCRIPTORS
DESCRIPTORS: ACHING
DESCRIPTORS: SHARP;STABBING;DISCOMFORT
DESCRIPTORS: ACHING;DISCOMFORT;SORE
DESCRIPTORS: ACHING;DISCOMFORT
DESCRIPTORS: ACHING;DISCOMFORT;SORE
DESCRIPTORS: ACHING;DISCOMFORT;SORE

## 2022-04-25 ASSESSMENT — PAIN DESCRIPTION - LOCATION
LOCATION: BACK

## 2022-04-25 ASSESSMENT — PAIN DESCRIPTION - ONSET
ONSET: ON-GOING
ONSET: GRADUAL
ONSET: GRADUAL

## 2022-04-25 ASSESSMENT — PAIN DESCRIPTION - PAIN TYPE: TYPE: CHRONIC PAIN

## 2022-04-25 ASSESSMENT — PAIN DESCRIPTION - ORIENTATION
ORIENTATION: LOWER;MID
ORIENTATION: LOWER;MID
ORIENTATION: LOWER
ORIENTATION: MID

## 2022-04-25 NOTE — PROGRESS NOTES
Hospitalist Progress Note      SYNOPSIS: Patient admitted on 4/8/2022 for Cord compression [facility])      SUBJECTIVE  In good spirits  Patient has been receiving GI cocktail to treat symptoms of heartburn  Symptoms usually more along the lines of acid reflux and not having too much in the way of painful swallowing  On the last evening she did experience what she describes some midepigastric discomfort  Protonix dosing was increased to twice daily prior to this occurring  Carafate had been ordered she had only received 1 dose  Her symptoms started to improve after receiving second dose of Carafate  Denies emesis denies diarrhea denies difficulty voiding  Den CP or SOB  Den constipation    OBJECTIVE:/80   Pulse 84   Temp 97.5 °F (36.4 °C)   Resp 18   Ht 5' (1.524 m)   Wt 172 lb (78 kg)   SpO2 99%   BMI 33.59 kg/m²   General appearance: Not diaphoretic. Not flushed  HEENT: No thrush no obv swelling to lips/tongue    Respiratory: No wheezing on  auscultation   cardiovascular: Pulse reg audible S1-S2    Neurologic: awake, alert and attentive  Psych= pleasant in good spirits mood and affect match  Derm= no hives or petechiae no papules    Sacral region intact  ASSESSMENT/  PLAN:  For \"heartburn symptoms =    cont  ppi to bid   and carafte 1 gm po qid  If symptoms do not improve may wish to   check ugi- for now hold off on ugi    Not sure this would fit  candid esophagitis as she is not experiencing   odynophagia  Depends on symptoms may need egd      Contin with rad rx and pt and pain control  Today #9/10 radiation tx to T-spine   anticipated 2 more treatments next mond and tuesd   once completed move forward with anticipated diacharge  Cord compression of the lumbar and thoracic spines secondary to metastatic disease-neurosurgery recommends wearing her brace when out of bed to continue pain management     Pain control.  Palliative care consulted for symptoms management.   meds doses have been adjusted    Metastatic breast cancer      DVT secondary to malignancy: on eliquis, r  Urinary tract infection:/treated   Urine culture + Escherichia coli - completed course of antibiotics   Hypertension: on troprol XL controlled  COPD: c/w breathing treatment s prn  Hyponatremia: corrected, Held hydrochlorothiazide continue to hold this as blood pressures currently in acceptable range  Hypokalemia: corrected  Anxiety: she is using Ativan as needed  Anemia NC has been in 10 range since April 7 2022      DISPOSITION: patient desires to return home once this round of  treatments cycle   Completed on tuesday    Medications:  REVIEWED DAILY    Infusion Medications    sodium chloride       Scheduled Medications    pantoprazole  40 mg Oral BID AC    sucralfate  1 g Oral 4 times per day    fentaNYL  1 patch TransDERmal Q72H    celecoxib  100 mg Oral BID    chlorhexidine  15 mL Mouth/Throat BID    apixaban  5 mg Oral BID    polyethylene glycol  17 g Oral Daily    sennosides-docusate sodium  2 tablet Oral Nightly    gabapentin  300 mg Oral TID    dexamethasone  4 mg Oral 2 times per day    metoprolol succinate  50 mg Oral BID    montelukast  10 mg Oral Nightly    pravastatin  20 mg Oral Dinner    sodium chloride flush  10 mL IntraVENous 2 times per day     PRN Meds: GI cocktail, oxyCODONE, medicated lip balm, hydrOXYzine, diphenhydrAMINE, LORazepam, albuterol, zolpidem, sodium chloride flush, sodium chloride, promethazine **OR** ondansetron    Labs:       Radiology:     +++++++++++++++++++++++++++++++++++++++++++++++++  Corrine Ortiz DO  [address]  +++++++++++++++++++++++++++++++++++++++++++++++++  NOTE: This report was transcribed using voice recognition software. Every effort was made to ensure accuracy; however, inadvertent computerized transcription errors may be present.

## 2022-04-25 NOTE — CARE COORDINATION
4/25/2022 social work transition of care planning  Pt plan is home,once radiation tx completed. Will call community palliative once pt is discharged. Belinda [phone].   Electronically signed by REID Etienne on 4/25/2022 at 8:01 AM

## 2022-04-25 NOTE — PROGRESS NOTES
Occupational Therapy  OT BEDSIDE TREATMENT NOTE   [facility], [place], [facility] [address]  [address] [place] [address] [facility]   Patient Name: Staci De La Cruz  MRN: [id number]  : 1955  Room: [address] [place] [address]     Per OT Eval:    Evaluating OT: Claudeen Rack, OTR/L, [id number]     Referring Carlos Dunaway DO  Specific Provider Orders/Date: OT eval and treat 22     Diagnosis: Cord compression ([address] [G95.20]  Intractable back pain [M54.9]   Surgery: NA     Pertinent Medical History:      Past Medical History        Past Medical History:   Diagnosis Date    Anxiety       pt anxious over surgery takes xanax prn    Arthritis       osteo    Asthma      Cancer ([facility]      right breast 2012 left breast    COPD (chronic obstructive pulmonary disease) ([facility]       stable per pt no sob    Depression       stable for diagnosis    Diabetes mellitus ([facility]       stable per pt    FHx: chemotherapy 2000    Hx of blood clots       left leg, bilat lung PE, 2014    Hyperlipidemia      Hypertension       stable per pt    PONV (postoperative nausea and vomiting)      Radiation 2000     hx of    Reflux      Restless leg syndrome              Past Surgical History         Past Surgical History:   Procedure Laterality Date    ABDOMEN SURGERY         colon resection    BREAST RECONSTRUCTION Bilateral 10/23/2015      stage I    BREAST SURGERY        partial right breast mastectomy     BREAST SURGERY        left breast mastectomy    CARPAL TUNNEL RELEASE         [address]     right total hip    MOHS SURGERY       TOENAIL EXCISION         bilat    TUNNELED VENOUS PORT PLACEMENT         right chest, 2012    VASCULAR SURGERY         stent placed left leg d/t blood clots          Precautions:  Fall Risk, soft TLSO, full weight bearing     Assessment of current deficits    [x] Functional mobility          [x]ADLs           [x] Strength                  []Cognition    [x] Functional transfers        [x] IADLs         [] Safety Awareness   [x]Endurance    [] Fine Coordination                        [] Balance      [] Vision/perception   []Sensation      []Gross Motor Coordination            [] ROM           [] Delirium                   [] Motor Control      OT PLAN OF CARE   OT POC based on physician orders, patient diagnosis and results of clinical assessment     Frequency/Duration 1-3 days/wk for 2 weeks PRN   Specific OT Treatment Interventions to include:   * Instruction/training on adapted ADL techniques and AE recommendations to increase functional independence within precautions       * Training on energy conservation strategies, correct breathing pattern and techniques to improve independence/tolerance for self-care routine  * Functional transfer/mobility training/DME recommendations for increased independence, safety, and fall prevention  * Patient/Family education to increase follow through with safety techniques and functional independence  * Recommendation of environmental modifications for increased safety with functional transfers/mobility and ADLs  * Splinting/positioning for increased function, prevention of contractures, and improve skin integrity  * Therapeutic exercise to improve motor endurance, ROM, and functional strength for ADLs/functional transfers  * Therapeutic activities to facilitate/challenge dynamic balance, stand tolerance for increased safety and independence with ADLs  * Therapeutic activities to facilitate gross/fine motor skills for increased independence with ADLs  * Positioning to improve skin integrity, interaction with environment and functional independence  * Delirium prevention/treatment     Recommended Adaptive Equipment: front wheeled walker, leg *, long handled sponge*, reacher*, (*issued 4/10)     Comments: Based on patient's functional performance as stated below and level of assistance needed prior to admission, this therapist believes that the patient would benefit from further skilled OT following hospital stay in an effort to increase safety, functional independence, and quality of life. Home Living: Pt lives with son in a 1 story home with 3 steps down and 3 steps up to enter and no rail(s); bed/bath on main floor  Bathroom setup: tub shower, [address], [place]; standard toilet  Equipment owned: cane, sw, shower chair     Prior Level of Function: some assistance with ADLs and with IADLs; using sw vs cane for ambulation. Driving: yes  Occupation: retired      Pain Level: Pt complained of minimal stomach pain this session  Cognition: A&O: 4/4; Follows 3 step directions              Memory: G              Sequencing: G              Problem solving: G              Judgement/safety: G                Functional Assessment: AM-PAC Daily Activity Raw Score: 19/24    Initial Eval Status  Date: 4/10/22 Treatment Status  Date: 4/25/22 STGs = LTGs  Time frame: 10-14 days   Feeding Set up A  Setup      Grooming Set up A  seated  Sup  To wash face and hands standing at sink    supervision while seated/standing at sink    UB Dressing Mod A  To clint brace in supine; educated regarding spinal neutrality  min A  To don TLSO seated EOB. Min A for adjusting for comfort    setup   LB Dressing Mod A  To clint underpants in supine. Educated re adaptive equipment, no interest in sock aide or shoe horn  SBA  To don socks supine in bed.  Cuing to maintain precautions Mod I    Bathing Mod A  Simulated; demo'd use of leg  for shower transfers  SBA  Simulated Task seated and standing for posterior Min A   Toileting Min A  simulated  Sup- clothing management  SBA- hygiene Supervision   Bed Mobility  Rolling: SBA  Supine to sit: MIn A  Sit to supine: NT Sup  Supine>EOB    Log roll technique      Functional Transfers Sit to stand: Min A  Stand to sit: Min A Sup  Sit to Stand  Stand to Sit    Cueing for hand placement  Mod I with ww   Functional Mobility Min A ww  For in-room distance  Sup  To and from bathroom using w/w Mod I for in-home distances   Balance Sitting:     Static:  supervision    Dynamic: Supervision  Standing: CGA ww  Sitting:  Independent    Standing:  Sup     Endurance/Activity Tolerance Fair-  Fair+ Good-   Visual/  Perceptual Glasses: yes, not present in room                        Comments: Upon arrival pt supine in bed. Pt educated on adaptive techniques to increase independence and safety during ADL's, bed mobility, and functional transfers. Discussed home set up with pt, giving suggestions to increase safety at discharge. At end of session, pt then seated upright in chair, all tubes and lines intact, call light within reach. Pt has made fair+ progress towards set goals.    Continue with current plan of care focusing on increasing of independency with transfers and ADL tasks      Treatment Time In: 9:50           Treatment Time Out: 10:15              Treatment Charges: Mins Units   Ther Ex  [id number]     Manual Therapy 93817     Thera Activities 59810 10 1   ADL/Home Mgt 90962 15 1   Neuro Re-ed 19402     Group Therapy      Orthotic manage/training  [id number]     Non-Billable Time     Total Timed Treatment 25 2        Duy Spence

## 2022-04-25 NOTE — PROGRESS NOTES
Palliative Care Department  [phone]  Palliative Care Progress Note  Provider Rossi Muro, APRN - CNP      PATIENT: Kiera Ulloa  : 1955  MRN: [id number]  ADMISSION DATE: 2022  7:12 PM  Referring Provider: Winifred Altamirano MD    Palliative Medicine was consulted on hospital day 17 for assistance with Goals of care, Symptom management     HPI:     Sienna Morgan is a 61 y.o. y/o female with a history of static breast cancer, anxiety, COPD, depression, diabetes mellitus, hypertension, restless leg syndrome who presented to [facility]) on 2022 as a transfer from [facility] for a neurosurgery consultation due to cord compression at T9 from extensive metastases disease to  thoracic and lumbar spine. After have been evaluated by neurosurgery, patient is not interested in surgical intervention, and off-the-shelf TLSO brace was recommended. 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 every 4 hours as needed  o Hydroxyzine 25 mg 3 times daily as needed   Pain due to neoplasm  o Fentanyl 25 mcg patch every 72 hours  o gabapentin 300 mg 3 times daily  o Oxy IR 15mg every 4 hours as needed    Palliative Care Encounter / Counseling Regarding Goals of Care  Please see detailed goals of care discussion as below   At this time, Kiera Ulloa, Does have capacity for medical decision-making. Capacity is time limited and situation/question specific   Outcome of goals of care meeting:  o Continue with current medical treatment  o Wishes for CODE STATUS to be changed to limited DNR CCA DNI  o Does not want aggressive surgical intervention, but is hoping for conservative treatment.     Code status Limited DNR CCA DNI   Advanced Directives: no POA or living will in [facility]:    Spiritual assessment: no spiritual distress identified  Bereavement and grief: to be determined  Referrals to: none today    Thank you for the opportunity to participate in the care of Andrea Suero. KATHIE Cortez - CNP  Palliative Medicine     SUBJECTIVE:     Details of Conversation:      Met with patient at the bedside. Patient had received 6 doses of oxycodone in the last 24 hours, she reports her pain is controlled with medication regimen. Voiced pain 2 out of 10. She is going to go for radiation treatment this afternoon. She voiced no new concerns. We will continue to follow. Prognosis: Guarded    OBJECTIVE:     /80   Pulse 84   Temp 97.5 °F (36.4 °C)   Resp 18   Ht 5' (1.524 m)   Wt 172 lb (78 kg)   SpO2 99%   BMI 33.59 kg/m²     Physical Examination:  Gen: NAD, awake, alert   HEENT: normocephalic, atraumatic, PERRL, EOMI,   Neck: trachea midline, no JVD  Lungs: respirations easy and not labored,   Heart: regular rate and rhythm, distant heart tones,   Abdomen: normoactive bowel sounds, soft, non-tender  Extremities: no clubbing, cyanosis or edema, moving all extremities    Skin: warm, dry without rashes, lesions, bruising  Neuro: awake, alert, oriented x 3, follows commands, no gross neurologic deficit     Objective data reviewed: labs, images, records, medication use, vitals and chart    Time/Communication  Greater than 50% of time spent, total 15 minutes in counseling and coordination of care at the bedside regarding goals of care and symptom management. Thank you for allowing Palliative Medicine to participate in the care of Andrea Suero. Note: This report was completed using computerRedMica voiced recognition software. Every effort has been made to ensure accuracy; however, inadvertent computerized transcription errors may be present.

## 2022-04-25 NOTE — PROGRESS NOTES
Neurosurg progress note  VITALS:  /80   Pulse 84   Temp 97.5 °F (36.4 °C)   Resp 18   Ht 5' (1.524 m)   Wt 172 lb (78 kg)   SpO2 99%   BMI 33.59 kg/m²   24HR INTAKE/OUTPUT:    Intake/Output Summary (Last 24 hours) at 4/25/2022 1103  Last data filed at 4/25/2022 1044  Gross per 24 hour   Intake 700 ml   Output 900 ml   Net -200 ml     No results found.   CBC:   Lab Results   Component Value Date    WBC 7.5 04/25/2022    RBC 3.74 04/25/2022    HGB 10.7 04/25/2022    HCT 34.3 04/25/2022    MCV 91.7 04/25/2022    MCH 28.6 04/25/2022    MCHC 31.2 04/25/2022    RDW 16.5 04/25/2022     04/25/2022    MPV 9.0 04/25/2022     BMP:    Lab Results   Component Value Date     04/25/2022    K 4.7 04/25/2022    K 3.7 04/14/2022     04/25/2022    CO2 27 04/25/2022    BUN 20 04/25/2022    LABALBU 3.4 04/18/2022    CREATININE 0.5 04/25/2022    CALCIUM 8.4 04/25/2022    GFRAA >60 04/25/2022    LABGLOM >60 04/25/2022    LABGLOM >60 05/30/2017    GLUCOSE 130 04/25/2022    GLUCOSE 178 05/30/2017      pantoprazole  40 mg Oral BID AC    sucralfate  1 g Oral 4 times per day    fentaNYL  1 patch TransDERmal Q72H    celecoxib  100 mg Oral BID    chlorhexidine  15 mL Mouth/Throat BID    apixaban  5 mg Oral BID    polyethylene glycol  17 g Oral Daily    sennosides-docusate sodium  2 tablet Oral Nightly    gabapentin  300 mg Oral TID    dexamethasone  4 mg Oral 2 times per day    metoprolol succinate  50 mg Oral BID    montelukast  10 mg Oral Nightly    pravastatin  20 mg Oral Dinner    sodium chloride flush  10 mL IntraVENous 2 times per day     Awake and alert no acute distress sitting upright with her brace on next to her hospital bed oriented x3 follows complex commands pupils equal round reactive extraocular movements are full motor is full  Assessment:  Patient Active Problem List   Diagnosis    S/P mastectomy, bilateral    Breast cancer metastasized to axillary lymph node ([address]    Cord compression (HCC)    Intractable back pain    UTI (urinary tract infection)    Debility     Plan: She is to continue with radiation treatments to her spine to wear her brace when out of bed continue pain management okay for discharge from neurosurgery standpoint   Elizabeth Howard MD M.D.

## 2022-04-26 ENCOUNTER — HOSPITAL ENCOUNTER (OUTPATIENT)
Dept: RADIATION ONCOLOGY | Age: 67
Discharge: HOME | End: 2022-04-26
Attending: RADIOLOGY

## 2022-04-26 PROCEDURE — 6370000000 HC RX 637 (ALT 250 FOR IP): Performed by: NEUROLOGICAL SURGERY

## 2022-04-26 PROCEDURE — 6370000000 HC RX 637 (ALT 250 FOR IP): Performed by: NURSE PRACTITIONER

## 2022-04-26 PROCEDURE — 2580000003 HC RX 258: Performed by: NEUROLOGICAL SURGERY

## 2022-04-26 PROCEDURE — 77387 GUIDANCE FOR RADJ TX DLVR: CPT | Performed by: RADIOLOGY

## 2022-04-26 PROCEDURE — 99231 SBSQ HOSP IP/OBS SF/LOW 25: CPT | Performed by: NURSE PRACTITIONER

## 2022-04-26 PROCEDURE — 1200000000 HC SEMI PRIVATE

## 2022-04-26 PROCEDURE — 6370000000 HC RX 637 (ALT 250 FOR IP): Performed by: INTERNAL MEDICINE

## 2022-04-26 PROCEDURE — 77412 RADIATION TX DELIVERY LVL 3: CPT | Performed by: RADIOLOGY

## 2022-04-26 PROCEDURE — 77014 PR CT GUIDANCE PLACEMENT RAD THERAPY FIELDS: CPT | Performed by: RADIOLOGY

## 2022-04-26 PROCEDURE — 6360000002 HC RX W HCPCS: Performed by: NEUROLOGICAL SURGERY

## 2022-04-26 PROCEDURE — 77336 RADIATION PHYSICS CONSULT: CPT | Performed by: RADIOLOGY

## 2022-04-26 PROCEDURE — 77427 RADIATION TX MANAGEMENT X5: CPT | Performed by: RADIOLOGY

## 2022-04-26 RX ORDER — PROMETHAZINE HYDROCHLORIDE 12.5 MG/1
12.5 TABLET ORAL EVERY 6 HOURS PRN
Qty: 20 TABLET | Refills: 0 | Status: SHIPPED | OUTPATIENT
Start: 2022-04-26 | End: 2022-05-03

## 2022-04-26 RX ORDER — DEXAMETHASONE 4 MG/1
4 TABLET ORAL EVERY 12 HOURS SCHEDULED
Qty: 20 TABLET | Refills: 0 | Status: SHIPPED | OUTPATIENT
Start: 2022-04-26 | End: 2022-05-06

## 2022-04-26 RX ORDER — CELECOXIB 100 MG/1
100 CAPSULE ORAL 2 TIMES DAILY
Qty: 60 CAPSULE | Refills: 3 | Status: SHIPPED | OUTPATIENT
Start: 2022-04-26

## 2022-04-26 RX ORDER — SUCRALFATE 1 G/1
1 TABLET ORAL 4 TIMES DAILY
Qty: 120 TABLET | Refills: 0 | Status: SHIPPED | OUTPATIENT
Start: 2022-04-26

## 2022-04-26 RX ORDER — HYDROXYZINE PAMOATE 25 MG/1
25 CAPSULE ORAL 3 TIMES DAILY PRN
Qty: 60 CAPSULE | Refills: 0 | Status: SHIPPED | OUTPATIENT
Start: 2022-04-26 | End: 2022-05-10

## 2022-04-26 RX ADMIN — SUCRALFATE 1 G: 1 TABLET ORAL at 13:41

## 2022-04-26 RX ADMIN — PANTOPRAZOLE SODIUM 40 MG: 40 TABLET, DELAYED RELEASE ORAL at 05:56

## 2022-04-26 RX ADMIN — APIXABAN 5 MG: 5 TABLET, FILM COATED ORAL at 21:22

## 2022-04-26 RX ADMIN — 0.12% CHLORHEXIDINE GLUCONATE 15 ML: 1.2 RINSE ORAL at 21:22

## 2022-04-26 RX ADMIN — PRAVASTATIN SODIUM 20 MG: 20 TABLET ORAL at 19:34

## 2022-04-26 RX ADMIN — 0.12% CHLORHEXIDINE GLUCONATE 15 ML: 1.2 RINSE ORAL at 08:39

## 2022-04-26 RX ADMIN — Medication 10 ML: at 21:23

## 2022-04-26 RX ADMIN — OXYCODONE HYDROCHLORIDE 15 MG: 15 TABLET ORAL at 06:05

## 2022-04-26 RX ADMIN — GABAPENTIN 300 MG: 300 CAPSULE ORAL at 13:41

## 2022-04-26 RX ADMIN — SENNOSIDES AND DOCUSATE SODIUM 2 TABLET: 50; 8.6 TABLET ORAL at 21:23

## 2022-04-26 RX ADMIN — OXYCODONE HYDROCHLORIDE 15 MG: 15 TABLET ORAL at 17:58

## 2022-04-26 RX ADMIN — GABAPENTIN 300 MG: 300 CAPSULE ORAL at 21:23

## 2022-04-26 RX ADMIN — Medication 10 ML: at 08:40

## 2022-04-26 RX ADMIN — CELECOXIB 100 MG: 100 CAPSULE ORAL at 08:33

## 2022-04-26 RX ADMIN — SUCRALFATE 1 G: 1 TABLET ORAL at 05:56

## 2022-04-26 RX ADMIN — OXYCODONE HYDROCHLORIDE 15 MG: 15 TABLET ORAL at 14:02

## 2022-04-26 RX ADMIN — DEXAMETHASONE 4 MG: 4 TABLET ORAL at 08:33

## 2022-04-26 RX ADMIN — APIXABAN 5 MG: 5 TABLET, FILM COATED ORAL at 08:33

## 2022-04-26 RX ADMIN — DEXAMETHASONE 4 MG: 4 TABLET ORAL at 21:22

## 2022-04-26 RX ADMIN — SUCRALFATE 1 G: 1 TABLET ORAL at 17:35

## 2022-04-26 RX ADMIN — METOPROLOL SUCCINATE 50 MG: 50 TABLET, EXTENDED RELEASE ORAL at 21:23

## 2022-04-26 RX ADMIN — PANTOPRAZOLE SODIUM 40 MG: 40 TABLET, DELAYED RELEASE ORAL at 17:35

## 2022-04-26 RX ADMIN — GABAPENTIN 300 MG: 300 CAPSULE ORAL at 08:33

## 2022-04-26 RX ADMIN — OXYCODONE HYDROCHLORIDE 15 MG: 15 TABLET ORAL at 10:02

## 2022-04-26 RX ADMIN — SUCRALFATE 1 G: 1 TABLET ORAL at 23:31

## 2022-04-26 RX ADMIN — CELECOXIB 100 MG: 100 CAPSULE ORAL at 21:22

## 2022-04-26 RX ADMIN — OXYCODONE HYDROCHLORIDE 15 MG: 15 TABLET ORAL at 22:06

## 2022-04-26 RX ADMIN — MONTELUKAST SODIUM 10 MG: 10 TABLET ORAL at 21:23

## 2022-04-26 ASSESSMENT — PAIN DESCRIPTION - DESCRIPTORS
DESCRIPTORS: ACHING;DISCOMFORT;SORE
DESCRIPTORS: THROBBING;STABBING
DESCRIPTORS: ACHING
DESCRIPTORS: ACHING;DISCOMFORT;SORE
DESCRIPTORS: ACHING;OTHER (COMMENT);DISCOMFORT

## 2022-04-26 ASSESSMENT — PAIN SCALES - GENERAL
PAINLEVEL_OUTOF10: 3
PAINLEVEL_OUTOF10: 5
PAINLEVEL_OUTOF10: 5
PAINLEVEL_OUTOF10: 7
PAINLEVEL_OUTOF10: 5
PAINLEVEL_OUTOF10: 0
PAINLEVEL_OUTOF10: 4
PAINLEVEL_OUTOF10: 3
PAINLEVEL_OUTOF10: 7
PAINLEVEL_OUTOF10: 5
PAINLEVEL_OUTOF10: 8

## 2022-04-26 ASSESSMENT — PAIN DESCRIPTION - LOCATION
LOCATION: BACK

## 2022-04-26 ASSESSMENT — PAIN DESCRIPTION - ORIENTATION
ORIENTATION: MID;LOWER
ORIENTATION: MID
ORIENTATION: LOWER;MID
ORIENTATION: LOWER;MID

## 2022-04-26 ASSESSMENT — PAIN DESCRIPTION - FREQUENCY
FREQUENCY: CONTINUOUS

## 2022-04-26 ASSESSMENT — PAIN DESCRIPTION - PAIN TYPE: TYPE: CHRONIC PAIN

## 2022-04-26 ASSESSMENT — PAIN - FUNCTIONAL ASSESSMENT
PAIN_FUNCTIONAL_ASSESSMENT: PREVENTS OR INTERFERES SOME ACTIVE ACTIVITIES AND ADLS
PAIN_FUNCTIONAL_ASSESSMENT: PREVENTS OR INTERFERES SOME ACTIVE ACTIVITIES AND ADLS
PAIN_FUNCTIONAL_ASSESSMENT: ACTIVITIES ARE NOT PREVENTED

## 2022-04-26 ASSESSMENT — PAIN SCALES - WONG BAKER
WONGBAKER_NUMERICALRESPONSE: 4
WONGBAKER_NUMERICALRESPONSE: 0
WONGBAKER_NUMERICALRESPONSE: 2

## 2022-04-26 ASSESSMENT — PAIN DESCRIPTION - ONSET: ONSET: GRADUAL

## 2022-04-26 NOTE — PROGRESS NOTES
Katy Knox  4/26/2022  7:39 AM          No current facility-administered medications for this encounter. No current outpatient medications on file.      Facility-Administered Medications Ordered in Other Encounters   Medication Dose Route Frequency Provider Last Rate Last Admin    pantoprazole (PROTONIX) tablet 40 mg  40 mg Oral BID AC Noel Bonilla, DO   40 mg at 04/26/22 0556    sucralfate (CARAFATE) tablet 1 g  1 g Oral 4 times per day Makeda Khat, DO   1 g at 04/26/22 0556    aluminum & magnesium hydroxide-simethicone (MAALOX) 30 mL, lidocaine viscous hcl (XYLOCAINE) 5 mL (GI COCKTAIL)   Oral BID PRN Makeda Khat, DO   Given at 04/23/22 0827    fentaNYL (DURAGESIC) 25 MCG/HR 1 patch  1 patch TransDERmal Q72H KATHIE Reynolds - CNP   1 patch at 04/23/22 1608    oxyCODONE (OXY-IR) immediate release tablet 15 mg  15 mg Oral Q4H PRN KATHIE Reynolds CNP   15 mg at 04/26/22 6711    celecoxib (CELEBREX) capsule 100 mg  100 mg Oral BID Makeda Khat, DO   100 mg at 04/25/22 2157    medicated lip balm (BLISTEX/CARMEX) stick   Topical PRN Makeda Khat, DO        chlorhexidine (PERIDEX) 0.12 % solution 15 mL  15 mL Mouth/Throat BID Makeda Khat, DO   15 mL at 04/25/22 2158    hydrOXYzine (VISTARIL) capsule 25 mg  25 mg Oral TID PRN Moses Dunn MD   25 mg at 04/18/22 1101    apixaban (ELIQUIS) tablet 5 mg  5 mg Oral BID Lilliam Flor MD   5 mg at 04/25/22 2157    diphenhydrAMINE (BENADRYL) tablet 12.5 mg  12.5 mg Oral Q6H PRN Juan Manuel Bowser MD   12.5 mg at 04/15/22 1233    polyethylene glycol (GLYCOLAX) packet 17 g  17 g Oral Daily KATHIE Reed - CNP   17 g at 04/18/22 1058    sennosides-docusate sodium (SENOKOT-S) 8.6-50 MG tablet 2 tablet  2 tablet Oral Nightly KATHIE Reed CNP   2 tablet at 04/25/22 2157    gabapentin (NEURONTIN) capsule 300 mg  300 mg Oral TID Lilliam Flor MD   300 mg at 04/25/22 2157    dexamethasone (DECADRON) tablet 4 mg  4 mg Oral 2 times per day Ayan Carrillo MD   4 mg at 04/25/22 2157    LORazepam (ATIVAN) tablet 0.5 mg  0.5 mg Oral Q4H PRN Ayan Carrillo MD   0.5 mg at 04/21/22 1926    albuterol (PROVENTIL) nebulizer solution 2.5 mg  2.5 mg Nebulization Q6H PRN Ayan Carrillo MD        metoprolol succinate (TOPROL XL) extended release tablet 50 mg  50 mg Oral BID Ayan Carrillo MD   50 mg at 04/25/22 2158    montelukast (SINGULAIR) tablet 10 mg  10 mg Oral Nightly Ayan Carrillo MD   10 mg at 04/25/22 2158    zolpidem (AMBIEN) tablet 10 mg  10 mg Oral Nightly PRN Ayan Carrillo MD   10 mg at 04/25/22 2311    pravastatin (PRAVACHOL) tablet 20 mg  20 mg Oral Dinner Ayan Carrillo MD   20 mg at 04/25/22 1753    sodium chloride flush 0.9 % injection 10 mL  10 mL IntraVENous 2 times per day Ayan Carrillo MD   10 mL at 04/25/22 2158    sodium chloride flush 0.9 % injection 10 mL  10 mL IntraVENous PRN Ayan Carrillo MD   10 mL at 04/19/22 2252    0.9 % sodium chloride infusion   IntraVENous PRN Ayan Carrillo MD        promethazine (PHENERGAN) tablet 12.5 mg  12.5 mg Oral Q6H PRN Ayan Carrillo MD        Or    ondansetron [facility]) injection 4 mg  4 mg IntraVENous Q6H PRN Ayan Carrillo MD   4 mg at 04/13/22 1321          This is an up-to-date medication list.    Please take this list to your next care provider, and discard any previous medication lists.

## 2022-04-26 NOTE — PROGRESS NOTES
CLINICAL PHARMACY NOTE: MEDS TO BEDS    Total # of Prescriptions Filled: 6   The following medications were delivered to the patient:  · Hydroxyzine 25  · Dexamethasone 4  · eliquis 5  · Celecoxib 100  · Sucralfate 1  · Promethazine 12.5      Additional Documentation:

## 2022-04-26 NOTE — PROGRESS NOTES
Palliative Care Department  [phone]  Palliative Care Progress Note  Provider KATHIE Choe - CNP      PATIENT: Susannah Nash  : 1955  MRN: [id number]  ADMISSION DATE: 2022  7:12 PM  Referring Provider: Antoinette Danielle MD    Palliative Medicine was consulted on hospital day 18 for assistance with Goals of care, Symptom management     HPI:     Radha Stevenson is a 61 y.o. y/o female with a history of static breast cancer, anxiety, COPD, depression, diabetes mellitus, hypertension, restless leg syndrome who presented to [facility]) on 2022 as a transfer from [facility] for a neurosurgery consultation due to cord compression at T9 from extensive metastases disease to  thoracic and lumbar spine. After have been evaluated by neurosurgery, patient is not interested in surgical intervention, and off-the-shelf TLSO brace was recommended. 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 every 4 hours as needed  o Hydroxyzine 25 mg 3 times daily as needed   Pain due to neoplasm  o Fentanyl 25 mcg patch every 72 hours  o gabapentin 300 mg 3 times daily  o Oxy IR 15mg every 4 hours as needed    Palliative Care Encounter / Counseling Regarding Goals of Care  Please see detailed goals of care discussion as below   At this time, Susannah Nash, Does have capacity for medical decision-making. Capacity is time limited and situation/question specific   Outcome of goals of care meeting:  o Continue with current medical treatment  o Wishes for CODE STATUS to be changed to limited DNR CCA DNI  o Does not want aggressive surgical intervention, but is hoping for conservative treatment.     Code status Limited DNR CCA DNI   Advanced Directives: no POA or living will in [facility]:    Spiritual assessment: no spiritual distress identified  Bereavement and grief: to be determined  Referrals to: none today    Thank you for the opportunity to participate in the care of Jamal Sierra. KATHIE Marcus - NIKOLAS  Palliative Medicine     SUBJECTIVE:     Details of Conversation:    Chart reviewed and met with the patient at bedside. She states that she had a bad night because she did not get her pain medications when she had asked. She stated that the pain became more severe. Today she has been taking oxycodone about every 4 hours and states that the pain has been well controlled. She denies any nausea, loss of appetite, or constipation. Prognosis: Guarded    OBJECTIVE:     /84   Pulse 84   Temp 97.2 °F (36.2 °C)   Resp 16   Ht 5' (1.524 m)   Wt 172 lb (78 kg)   SpO2 98%   BMI 33.59 kg/m²     Physical Examination:  Gen: NAD, awake, alert   HEENT: normocephalic, atraumatic, PERRL, EOMI,   Neck: trachea midline, no JVD  Lungs: respirations easy and not labored,   Heart: regular rate and rhythm, distant heart tones,   Abdomen: normoactive bowel sounds, soft, non-tender  Extremities: no clubbing, cyanosis or edema, moving all extremities    Skin: warm, dry without rashes, lesions, bruising  Neuro: awake, alert, oriented x 3, follows commands, no gross neurologic deficit     Objective data reviewed: labs, images, records, medication use, vitals and chart    Time/Communication  Greater than 50% of time spent, total 15 minutes in counseling and coordination of care at the bedside regarding goals of care and symptom management. Thank you for allowing Palliative Medicine to participate in the care of Jamal Sierra. Note: This report was completed using computer[employer] Era Portfolio voiced recognition software. Every effort has been made to ensure accuracy; however, inadvertent computerized transcription errors may be present.

## 2022-04-26 NOTE — PROGRESS NOTES
Pt and family aware of 11:30 am transport. Palliative care made aware and Dr. Marquez Lester. Palliative will address pain scripts prior to d/c.

## 2022-04-26 NOTE — PROGRESS NOTES
Spoke with physician ambulance the soonest they could pick patient up tonight is 10:30pm. Patient said that was too late.  Patient set up with physician ambulance tomorrow 4/27/22 at 11:30am.

## 2022-04-26 NOTE — PROGRESS NOTES
Neurosurg progress note  VITALS:  /86   Pulse 81   Temp 97.2 °F (36.2 °C) (Temporal)   Resp 16   Ht 5' (1.524 m)   Wt 172 lb (78 kg)   SpO2 97%   BMI 33.59 kg/m²   24HR INTAKE/OUTPUT:    Intake/Output Summary (Last 24 hours) at 4/26/2022 0740  Last data filed at 4/26/2022 6653  Gross per 24 hour   Intake 420 ml   Output 750 ml   Net -330 ml     No results found.   CBC:   Lab Results   Component Value Date    WBC 7.5 04/25/2022    RBC 3.74 04/25/2022    HGB 10.7 04/25/2022    HCT 34.3 04/25/2022    MCV 91.7 04/25/2022    MCH 28.6 04/25/2022    MCHC 31.2 04/25/2022    RDW 16.5 04/25/2022     04/25/2022    MPV 9.0 04/25/2022     BMP:    Lab Results   Component Value Date     04/25/2022    K 4.7 04/25/2022    K 3.7 04/14/2022     04/25/2022    CO2 27 04/25/2022    BUN 20 04/25/2022    LABALBU 3.4 04/18/2022    CREATININE 0.5 04/25/2022    CALCIUM 8.4 04/25/2022    GFRAA >60 04/25/2022    LABGLOM >60 04/25/2022    LABGLOM >60 05/30/2017    GLUCOSE 130 04/25/2022    GLUCOSE 178 05/30/2017      pantoprazole  40 mg Oral BID AC    sucralfate  1 g Oral 4 times per day    fentaNYL  1 patch TransDERmal Q72H    celecoxib  100 mg Oral BID    chlorhexidine  15 mL Mouth/Throat BID    apixaban  5 mg Oral BID    polyethylene glycol  17 g Oral Daily    sennosides-docusate sodium  2 tablet Oral Nightly    gabapentin  300 mg Oral TID    dexamethasone  4 mg Oral 2 times per day    metoprolol succinate  50 mg Oral BID    montelukast  10 mg Oral Nightly    pravastatin  20 mg Oral Dinner    sodium chloride flush  10 mL IntraVENous 2 times per day     Remains awake and alert, oriented times three, follows complex commands motor full  Assessment:  Patient Active Problem List   Diagnosis    S/P mastectomy, bilateral    Breast cancer metastasized to axillary lymph node (HCC)    Cord compression (HCC)    Intractable back pain    UTI (urinary tract infection)    Debility     Plan:Brace when OOB, XRT to spine  Kevin Stout MD M.D.

## 2022-04-26 NOTE — CARE COORDINATION
4/26/2022 social work transition of care planning  Pt plan is home, once radiation tx completed. Sw will notify [facility] at discharge [place]inda [phone]. Electronically signed by REID Akhtar on 4/26/2022 at 8:49 AM     Addendum: Sw notified by Nurse that pt stated that she will need ambulnce transport home at discharge. Ambulance from completed.   Electronically signed by REID Akhtar on 4/26/2022 at 11:05 AM

## 2022-04-26 NOTE — DISCHARGE SUMMARY
Hospitalist Discharge Summary    Patient ID: Alexei Gonzalez   Patient : 1955  Patient's PCP: KATHIE Rodrigez CNP    Admit Date: 2022   Admitting Physician: Ignacio Early DO    Discharge Date:  2022   Discharge Physician: Aminta Galdamez MD   Discharge Condition: Stable  Discharge Disposition: [facility] course in brief:  (Please refer to daily progress notes for a comprehensive review of the hospitalization by requesting medical records)  Patient admitted on 2022 for Cord compression. Patient transferred from [facility].  Has a history of metastatic breast cancer. Also history of hypertension, COPD and DVT, chronically anticoagulated on Eliquis. Patient has been having back pain since November with radiation down her legs. She reported some intermittent incontinence of urine over the past month. Diagnosed with urinary tract infection and started on Rocephin. CT revealed extensive metastatic disease to the right humerus, ribs, liver, lymph nodes and thoracic and lumbar spines causing cord compression at T9. Patient was transferred to [facility] for neurosurgery consultation. Off-the-shelf TLSO brace, risks and benefits of spinal surgery for decompression and stabilization were carefully discussed with pateint, she is not interested in spinal surgery. She has metastases throughout multiple regions of her spine and apparently her oncologist would like a biopsy to be certain this is breast cancer. No neurosurgical intervention planned   Vertebral body left L4 biopsy- 4/15/22---->Metastatic adenocarcinoma. Hem onc and Radiation Oncology were on board and patient received and completed the course of her Radiation therapy 10/10-XRT to the spine on 22. Continue on Decadron 4mg BID and dose will be adjusted outpatient  Palliative care were consulted and helped with goals of care and symptom and management.     Consults:   IP CONSULT TO NEUROSURGERY  INPATIENT CONSULT TO ORTHOTIST/PROSTHETIST  IP CONSULT TO CASE MANAGEMENT  IP CONSULT TO ONCOLOGY  IP CONSULT TO PALLIATIVE CARE  IP CONSULT TO RADIATION ONCOLOGY    Discharge Diagnoses:  Cord compression 2/2 Metastatic adenocarcinoma to the T-spine -T9  Breast Cancer  GERD  Esophageal spasm? HTN  UTI  COPD  Hyponatremia  Hypokalemia  Anxiety  Chronic Anemia      Discharge Instructions / Follow up: Follow-up with PCP within 1 week of discharge. Follow-up with consultants as indicated by them. Compliance with medications as prescribed on discharge. Future Appointments   Date Time Provider Marge Bates   6/10/2022 10:00 AM Mariana Duran, APRN - CNP [facility]       The patient's condition is stable. At this time the patient is without objective evidence of an acute process requiring continuing hospitalization or inpatient management. They are stable for discharge with outpatient follow-up. I have spoken with the patient and discussed the results of the current hospitalization, in addition to providing specific details for the plan of care and counseling regarding the diagnosis and prognosis. The plan has been discussed in detail and they are aware of the specific conditions for emergent return, as well as the importance of follow-up. Their questions are answered at this time and they are agreeable with the plan for discharge to home. Continued appropriate risk factor modification of blood pressure, diabetes and serum lipids will remain essential to reducing risk of future atherosclerotic development    Activity: activity as tolerated    Physical exam:  General appearance: No apparent distress, appears stated age and cooperative. HEENT: Conjunctivae/corneas clear. Mucous membranes moist.  Neck: Supple. No JVD. Respiratory:  Clear to auscultation bilaterally. Normal respiratory effort. Cardiovascular:  RRR. S1, S2 without MRG. PV: Pulses palpable. No edema. Abdomen: Soft, non-tender, non-distended. +BS  Musculoskeletal: No obvious deformities. Skin: Normal skin color. No rashes or lesions. Good turgor. Neurologic:  Grossly non-focal. Awake, alert, following commands. Psychiatric: Alert and oriented, thought content appropriate, normal insight and judgement    Significant labs:  CBC:   Recent Labs     04/25/22 0520   WBC 7.5   RBC 3.74   HGB 10.7*   HCT 34.3   MCV 91.7   RDW 16.5*        BMP:   Recent Labs     04/25/22 0520      K 4.7      CO2 27   BUN 20   CREATININE 0.5     LFT:  No results for input(s): PROT, ALB, ALKPHOS, ALT, AST, BILITOT, AMYLASE, LIPASE in the last 72 hours. PT/INR: No results for input(s): INR, APTT in the last 72 hours. BNP: No results for input(s): BNP in the last 72 hours. Hgb A1C:   Lab Results   Component Value Date    LABA1C 6.3 (H) 05/30/2017     Folate and B12:   Lab Results   Component Value Date    ORNHOQCA09 964 05/30/2017   , No results found for: FOLATE  Thyroid Studies:   Lab Results   Component Value Date    TSH 0.361 05/30/2017       Urinalysis:    Lab Results   Component Value Date    WBCUA LARGE 04/07/2022    BACTERIA 2+ 04/07/2022    RBCUA MODERATE 04/07/2022    GLUCOSEU NEGATIVE 04/07/2022       Imaging:  CT CHEST W WO CONTRAST    Result Date: 4/10/2022  EXAMINATION: CT OF THE CHEST WITH AND WITHOUT CONTRAST 4/10/2022 2:22 pm TECHNIQUE: CT of the chest was performed without and with the administration of intravenous contrast. Multiplanar reformatted images are provided for review. Dose modulation, iterative reconstruction, and/or weight based adjustment of the mA/kV was utilized to reduce the radiation dose to as low as reasonably achievable. COMPARISON: None.  HISTORY: ORDERING SYSTEM PROVIDED HISTORY: Breast CA, eval metastasis TECHNOLOGIST PROVIDED HISTORY: Reason for exam:->Breast CA, eval metastasis What reading provider will be dictating this exam?->CRC FINDINGS: Mediastinum: No thyroid nodules. There are multiple enlarged mediastinal lymph nodes, for example an 11 mm x 14 mm lower right paratracheal lymph node. There is a prominent but subcentimeter short axis right axillary lymph node (series 301, image 60). No thoracic aortic aneurysm or dissection. Atherosclerotic disease. No central pulmonary embolism. The heart is not enlarged. No pericardial effusion. Unremarkable esophagus. Right chest wall port catheter terminates near the superior cavoatrial junction. Lungs/pleura: No pleural effusion or pneumothorax. No pulmonary consolidation. The central airways are patent. No suspicious pulmonary nodules are seen. Upper Abdomen: There is a rim enhancing lesion in the right hepatic lobe, better depicted on comparison CT abdomen pelvis where it has features of hemangioma. Atherosclerotic disease. IVC filter with apex at the level of the renal veins. .  Status post cholecystectomy. Sarah cm left adrenal nodule. Soft Tissues/Bones: There are numerous bone metastases, particularly in the spine. Mild height loss of T3, T4, and T5. Moderate height loss of T9. Mild height loss of T12, L1, and L2. Bilateral rib metastases. Right humeral head metastasis. Bilateral breast implants. Enlarged mediastinal lymph nodes are suspicious for lymph node metastases. Prominent but subcentimeter short axis right axillary lymph node is nonspecific. Left adrenal nodule may represent a metastasis. Multiple bone metastases with pathologic fractures. Recommend contrast enhanced MRI of the spine for further evaluation.      MRI THORACIC SPINE W WO CONTRAST    Result Date: 4/12/2022  EXAMINATION: MRI OF THE THORACIC SPINE WITHOUT AND WITH CONTRAST  4/12/2022 9:23 pm TECHNIQUE: Multiplanar multisequence MRI of the thoracic spine was performed without and with the administration of intravenous contrast. COMPARISON: None HISTORY: ORDERING SYSTEM PROVIDED HISTORY: breast mets to spine TECHNOLOGIST PROVIDED HISTORY: Reason for exam:->breast mets to spine What reading provider will be dictating this exam?->CRC FINDINGS: BONES/ALIGNMENT: Thoracic spine alignment is normal.  Pathologic diffuse moderate compression deformity is present at T9. Metastatic disease is present at T3, T4, T5 T9, T10, T11 and T12. There is minor posterior element involvement at T6. No cortical breakthrough is evident although posterior cortical margin of the vertebral body is bulging at T9. This does not result in significant central canal stenosis. SPINAL CORD: No abnormal cord signal is seen. SOFT TISSUES:  No abnormal enhancement of the thoracic spine. No paraspinal mass identified. DEGENERATIVE CHANGES: Diffuse mild age-appropriate degenerative changes are present. Degenerative changes do not result in stenosis however posterior cortical bulging at T9 results in moderate bilateral T9-10 foraminal stenosis. Extensive metastatic disease throughout the thoracic spine with resulting pathologic compression diffusely at T9 which also shows bulging posterior cortical contour. There is resulting moderate bilateral T9-10 foraminal stenosis. MRI LUMBAR SPINE W WO CONTRAST    Result Date: 4/12/2022  EXAMINATION: MRI OF THE LUMBAR SPINE WITHOUT AND WITH CONTRAST  4/12/2022 9:23 pm TECHNIQUE: Multiplanar multisequence MRI of the lumbar spine was performed without and with the administration of intravenous contrast. COMPARISON: None. HISTORY: ORDERING SYSTEM PROVIDED HISTORY: breast mets to spine TECHNOLOGIST PROVIDED HISTORY: Reason for exam:->breast mets to spine What reading provider will be dictating this exam?->CRC FINDINGS: BONES/ALIGNMENT: Lumbar spine alignment and vertebral body heights are normal.  Metastatic disease is present at all lumbar levels although L3 is somewhat less involved. Posterior elements are involved at L1.   The left sacrum and anterior aspect of S1 are involved as are the posterior iliac bones bilaterally, right worse than left. No cortical breakthrough is identified. SPINAL CORD:  The conus terminates normally. SOFT TISSUES: No abnormal enhancement is seen of the lumbar spine. No paraspinal mass identified. L1-L2: There is no significant disc protrusion, spinal canal stenosis or neural foraminal narrowing. L2-L3: There is no significant disc protrusion, spinal canal stenosis or neural foraminal narrowing. L3-L4: There is no significant disc protrusion, spinal canal stenosis or neural foraminal narrowing. L4-L5: There is no significant disc protrusion, spinal canal stenosis or neural foraminal narrowing. L5-S1: There is no significant disc protrusion, spinal canal stenosis or neural foraminal narrowing. Diffuse metastatic disease of the lumbar spine without identified cortical breakthrough. The sacrum and posterior iliac bones bilaterally are also involved. Diffuse mild age-appropriate lumbar spine degenerative changes without stenosis. FLUORO FOR SURGICAL PROCEDURES    Result Date: 4/15/2022  EXAMINATION: SPOT FLUOROSCOPIC IMAGES 4/15/2022 8:56 am TECHNIQUE: Fluoroscopy was provided by the radiology department for procedure. Radiologist was not present during examination. FLUOROSCOPY DOSE AND TYPE OR TIME AND EXPOSURES: Fluoroscopy time equals 12.9 seconds. Total dose equals 7.46 mGy. COMPARISON: None HISTORY: ORDERING SYSTEM PROVIDED HISTORY: L4 bone biopsy TECHNOLOGIST PROVIDED HISTORY: Reason for exam:->L4 bone biopsy What reading provider will be dictating this exam?->CRC Intraprocedural imaging. FINDINGS: 1 spot images of the lumbar spine were obtained. Intraprocedural fluoroscopic spot images as above. See separate procedure report for more information.        Discharge Medications:      Medication List      START taking these medications    apixaban 5 MG Tabs tablet  Commonly known as: ELIQUIS  Take 1 tablet by mouth 2 times daily     celecoxib 100 MG capsule  Commonly known as: CELEBREX  Take 1 capsule by mouth 2 times daily     dexamethasone 4 MG tablet  Commonly known as: DECADRON  Take 1 tablet by mouth every 12 hours for 10 days     hydrOXYzine 25 MG capsule  Commonly known as: VISTARIL  Take 1 capsule by mouth 3 times daily as needed for Anxiety     promethazine 12.5 MG tablet  Commonly known as: PHENERGAN  Take 1 tablet by mouth every 6 hours as needed for Nausea     sucralfate 1 GM tablet  Commonly known as: CARAFATE  Take 1 tablet by mouth 4 times daily        CONTINUE taking these medications    albuterol (2.5 MG/3ML) 0.083% nebulizer solution  Commonly known as: PROVENTIL     albuterol 90 MCG/ACT inhaler  Commonly known as: PROVENTIL;VENTOLIN     diphenoxylate-atropine 2.5-0.025 MG per tablet  Commonly known as: LOMOTIL     fluticasone 110 MCG/ACT inhaler  Commonly known as: FLOVENT HFA     hydroCHLOROthiazide 25 MG tablet  Commonly known as: HYDRODIURIL     metoprolol succinate 50 MG extended release tablet  Commonly known as: TOPROL XL  Take 1 tablet by mouth 2 times daily     montelukast 10 MG tablet  Commonly known as: SINGULAIR     ondansetron 4 MG tablet  Commonly known as: Zofran  Take 1 tablet by mouth every 8 hours as needed for Nausea or Vomiting     pantoprazole 40 MG tablet  Commonly known as: PROTONIX     pravastatin 20 MG tablet  Commonly known as: PRAVACHOL     vitamin D 1.25 MG (84236 UT) Caps capsule  Commonly known as: ERGOCALCIFEROL     zolpidem 10 MG tablet  Commonly known as: AMBIEN        STOP taking these medications    ibuprofen 800 MG tablet  Commonly known as: IBU           Where to Get Your Medications      These medications were sent to [facility] [address] [address]    Phone: [phone]   · apixaban 5 MG Tabs tablet  · celecoxib 100 MG capsule  · dexamethasone 4 MG tablet  · hydrOXYzine 25 MG capsule  · promethazine 12.5 MG tablet  · sucralfate 1 GM tablet         Time Spent on discharge is more than 45 minutes in the examination, evaluation, counseling and review of medications and discharge plan.    +++++++++++++++++++++++++++++++++++++++++++++++++  Fab Thornton MD  [address]  +++++++++++++++++++++++++++++++++++++++++++++++++  NOTE: This report was transcribed using voice recognition software. Every effort was made to ensure accuracy; however, inadvertent computerized transcription errors may be present.

## 2022-04-27 VITALS
HEIGHT: 60 IN | DIASTOLIC BLOOD PRESSURE: 78 MMHG | OXYGEN SATURATION: 98 % | RESPIRATION RATE: 18 BRPM | BODY MASS INDEX: 33.77 KG/M2 | TEMPERATURE: 97.4 F | WEIGHT: 172 LBS | SYSTOLIC BLOOD PRESSURE: 124 MMHG | HEART RATE: 78 BPM

## 2022-04-27 PROCEDURE — 6370000000 HC RX 637 (ALT 250 FOR IP): Performed by: NEUROLOGICAL SURGERY

## 2022-04-27 PROCEDURE — 6360000002 HC RX W HCPCS: Performed by: NEUROLOGICAL SURGERY

## 2022-04-27 PROCEDURE — 6370000000 HC RX 637 (ALT 250 FOR IP): Performed by: NURSE PRACTITIONER

## 2022-04-27 PROCEDURE — 6370000000 HC RX 637 (ALT 250 FOR IP): Performed by: INTERNAL MEDICINE

## 2022-04-27 RX ORDER — FENTANYL 25 UG/H
1 PATCH TRANSDERMAL
Qty: 5 PATCH | Refills: 0 | Status: SHIPPED | OUTPATIENT
Start: 2022-04-29 | End: 2022-05-14

## 2022-04-27 RX ORDER — OXYCODONE HYDROCHLORIDE 15 MG/1
15 TABLET ORAL EVERY 4 HOURS PRN
Qty: 42 TABLET | Refills: 0 | Status: SHIPPED | OUTPATIENT
Start: 2022-04-27 | End: 2022-05-04

## 2022-04-27 RX ORDER — GABAPENTIN 300 MG/1
300 CAPSULE ORAL 3 TIMES DAILY
Qty: 45 CAPSULE | Refills: 0 | Status: SHIPPED | OUTPATIENT
Start: 2022-04-27 | End: 2022-05-12

## 2022-04-27 RX ORDER — FENTANYL 25 UG/H
1 PATCH TRANSDERMAL
Qty: 5 PATCH | Refills: 0 | Status: SHIPPED | OUTPATIENT
Start: 2022-04-29 | End: 2022-04-27

## 2022-04-27 RX ADMIN — OXYCODONE HYDROCHLORIDE 15 MG: 15 TABLET ORAL at 06:05

## 2022-04-27 RX ADMIN — APIXABAN 5 MG: 5 TABLET, FILM COATED ORAL at 08:00

## 2022-04-27 RX ADMIN — GABAPENTIN 300 MG: 300 CAPSULE ORAL at 08:00

## 2022-04-27 RX ADMIN — OXYCODONE HYDROCHLORIDE 15 MG: 15 TABLET ORAL at 02:05

## 2022-04-27 RX ADMIN — OXYCODONE HYDROCHLORIDE 15 MG: 15 TABLET ORAL at 10:14

## 2022-04-27 RX ADMIN — METOPROLOL SUCCINATE 50 MG: 50 TABLET, EXTENDED RELEASE ORAL at 08:00

## 2022-04-27 RX ADMIN — PANTOPRAZOLE SODIUM 40 MG: 40 TABLET, DELAYED RELEASE ORAL at 06:05

## 2022-04-27 RX ADMIN — 0.12% CHLORHEXIDINE GLUCONATE 15 ML: 1.2 RINSE ORAL at 08:00

## 2022-04-27 RX ADMIN — SUCRALFATE 1 G: 1 TABLET ORAL at 06:05

## 2022-04-27 RX ADMIN — CELECOXIB 100 MG: 100 CAPSULE ORAL at 08:00

## 2022-04-27 RX ADMIN — DEXAMETHASONE 4 MG: 4 TABLET ORAL at 08:00

## 2022-04-27 ASSESSMENT — PAIN DESCRIPTION - DESCRIPTORS: DESCRIPTORS: ACHING;DISCOMFORT;STABBING

## 2022-04-27 ASSESSMENT — PAIN SCALES - WONG BAKER
WONGBAKER_NUMERICALRESPONSE: 4
WONGBAKER_NUMERICALRESPONSE: 4
WONGBAKER_NUMERICALRESPONSE: 8
WONGBAKER_NUMERICALRESPONSE: 0
WONGBAKER_NUMERICALRESPONSE: 4

## 2022-04-27 ASSESSMENT — PAIN SCALES - GENERAL
PAINLEVEL_OUTOF10: 7
PAINLEVEL_OUTOF10: 9
PAINLEVEL_OUTOF10: 8
PAINLEVEL_OUTOF10: 0
PAINLEVEL_OUTOF10: 5
PAINLEVEL_OUTOF10: 4
PAINLEVEL_OUTOF10: 4

## 2022-04-27 ASSESSMENT — PAIN DESCRIPTION - FREQUENCY
FREQUENCY: CONTINUOUS
FREQUENCY: CONTINUOUS

## 2022-04-27 ASSESSMENT — PAIN - FUNCTIONAL ASSESSMENT
PAIN_FUNCTIONAL_ASSESSMENT: ACTIVITIES ARE NOT PREVENTED
PAIN_FUNCTIONAL_ASSESSMENT: ACTIVITIES ARE NOT PREVENTED

## 2022-04-27 ASSESSMENT — PAIN DESCRIPTION - ORIENTATION
ORIENTATION: MID
ORIENTATION: MID

## 2022-04-27 ASSESSMENT — PAIN DESCRIPTION - LOCATION
LOCATION: BACK

## 2022-04-27 ASSESSMENT — PAIN DESCRIPTION - PAIN TYPE
TYPE: ACUTE PAIN
TYPE: CHRONIC PAIN

## 2022-04-27 ASSESSMENT — PAIN DESCRIPTION - ONSET
ONSET: ON-GOING
ONSET: ON-GOING

## 2022-04-27 NOTE — CARE COORDINATION
4/27/2022 social work transition of care planning  Pt plan is home today.   Electronically signed by REID Ventura on 4/27/2022 at 8:29 AM

## 2022-04-27 NOTE — PROGRESS NOTES
Palliative notified pts fentanyl patch cannot be filled here at our pharmacy and asked to see if it may be sent to pts pharmacy instead.   Electronically signed by Toni Schulte RN on 4/27/2022 at 9:30 AM

## 2022-04-27 NOTE — PROGRESS NOTES
CLINICAL PHARMACY NOTE: MEDS TO BEDS    Total # of Prescriptions Filled: 2   The following medications were delivered to the patient:  · Gabapentin 300mg  · Oxycodone 15mg    Additional Documentation:    Delivered to pt 4/27 10:30

## 2022-04-27 NOTE — DISCHARGE SUMMARY
Hospitalist Discharge Summary    Patient ID: Jamal Sierra   Patient : 1955  Patient's PCP: Jasmin Mcallister, KATHIE - CNP    Admit Date: 2022   Admitting Physician: Natalie Redman DO    Discharge Date:  2022   Discharge Physician: Jensen Ricketts MD   Discharge Condition: Stable  Discharge Disposition: [facility] course in brief:  (Please refer to daily progress notes for a comprehensive review of the hospitalization by requesting medical records)  Patient admitted on 2022 for Cord compression. Patient transferred from [facility].  Has a history of metastatic breast cancer. Also history of hypertension, COPD and DVT, chronically anticoagulated on Eliquis. Patient has been having back pain since November with radiation down her legs. She reported some intermittent incontinence of urine over the past month. Diagnosed with urinary tract infection and started on Rocephin. CT revealed extensive metastatic disease to the right humerus, ribs, liver, lymph nodes and thoracic and lumbar spines causing cord compression at T9. Patient was transferred to [facility] for neurosurgery consultation. Off-the-shelf TLSO brace, risks and benefits of spinal surgery for decompression and stabilization were carefully discussed with pateint, she is not interested in spinal surgery. She has metastases throughout multiple regions of her spine and apparently her oncologist would like a biopsy to be certain this is breast cancer. No neurosurgical intervention planned   Vertebral body left L4 biopsy- 4/15/22---->Metastatic adenocarcinoma. Hem onc and Radiation Oncology were on board and patient received and completed the course of her Radiation therapy 10/10-XRT to the spine on 22. Continue on Decadron 4mg BID and dose will be adjusted outpatient  Palliative care were consulted and helped with goals of care and symptom and management.     Consults:   IP CONSULT TO NEUROSURGERY  INPATIENT CONSULT TO ORTHOTIST/PROSTHETIST  IP CONSULT TO CASE MANAGEMENT  IP CONSULT TO ONCOLOGY  IP CONSULT TO PALLIATIVE CARE  IP CONSULT TO RADIATION ONCOLOGY    Discharge Diagnoses:  Cord compression 2/2 Metastatic adenocarcinoma to the T-spine -T9  Breast Cancer  GERD  Esophageal spasm? HTN  UTI  COPD  Hyponatremia  Hypokalemia  Anxiety  Chronic Anemia      Discharge Instructions / Follow up: Follow-up with PCP within 1 week of discharge. Follow-up with consultants as indicated by them. Compliance with medications as prescribed on discharge. Future Appointments   Date Time Provider Marge Bates   6/10/2022 10:00 AM Alvaro Hogue, APRN - CNP [facility]       The patient's condition is stable. At this time the patient is without objective evidence of an acute process requiring continuing hospitalization or inpatient management. They are stable for discharge with outpatient follow-up. I have spoken with the patient and discussed the results of the current hospitalization, in addition to providing specific details for the plan of care and counseling regarding the diagnosis and prognosis. The plan has been discussed in detail and they are aware of the specific conditions for emergent return, as well as the importance of follow-up. Their questions are answered at this time and they are agreeable with the plan for discharge to home. Continued appropriate risk factor modification of blood pressure, diabetes and serum lipids will remain essential to reducing risk of future atherosclerotic development    Activity: activity as tolerated    Physical exam:  General appearance: No apparent distress, appears stated age and cooperative. HEENT: Conjunctivae/corneas clear. Mucous membranes moist.  Neck: Supple. No JVD. Respiratory:  Clear to auscultation bilaterally. Normal respiratory effort. Cardiovascular:  RRR. S1, S2 without MRG. PV: Pulses palpable. No edema. Abdomen: Soft, non-tender, non-distended. +BS  Musculoskeletal: No obvious deformities. Skin: Normal skin color. No rashes or lesions. Good turgor. Neurologic:  Grossly non-focal. Awake, alert, following commands. Psychiatric: Alert and oriented, thought content appropriate, normal insight and judgement    Significant labs:  CBC:   Recent Labs     04/25/22 0520   WBC 7.5   RBC 3.74   HGB 10.7*   HCT 34.3   MCV 91.7   RDW 16.5*        BMP:   Recent Labs     04/25/22 0520      K 4.7      CO2 27   BUN 20   CREATININE 0.5     LFT:  No results for input(s): PROT, ALB, ALKPHOS, ALT, AST, BILITOT, AMYLASE, LIPASE in the last 72 hours. PT/INR: No results for input(s): INR, APTT in the last 72 hours. BNP: No results for input(s): BNP in the last 72 hours. Hgb A1C:   Lab Results   Component Value Date    LABA1C 6.3 (H) 05/30/2017     Folate and B12:   Lab Results   Component Value Date    LDKTMCXK19 621 05/30/2017   , No results found for: FOLATE  Thyroid Studies:   Lab Results   Component Value Date    TSH 0.361 05/30/2017       Urinalysis:    Lab Results   Component Value Date    WBCUA LARGE 04/07/2022    BACTERIA 2+ 04/07/2022    RBCUA MODERATE 04/07/2022    GLUCOSEU NEGATIVE 04/07/2022       Imaging:  CT CHEST W WO CONTRAST    Result Date: 4/10/2022  EXAMINATION: CT OF THE CHEST WITH AND WITHOUT CONTRAST 4/10/2022 2:22 pm TECHNIQUE: CT of the chest was performed without and with the administration of intravenous contrast. Multiplanar reformatted images are provided for review. Dose modulation, iterative reconstruction, and/or weight based adjustment of the mA/kV was utilized to reduce the radiation dose to as low as reasonably achievable. COMPARISON: None.  HISTORY: ORDERING SYSTEM PROVIDED HISTORY: Breast CA, eval metastasis TECHNOLOGIST PROVIDED HISTORY: Reason for exam:->Breast CA, eval metastasis What reading provider will be dictating this exam?->CRC FINDINGS: Mediastinum: No thyroid nodules. There are multiple enlarged mediastinal lymph nodes, for example an 11 mm x 14 mm lower right paratracheal lymph node. There is a prominent but subcentimeter short axis right axillary lymph node (series 301, image 60). No thoracic aortic aneurysm or dissection. Atherosclerotic disease. No central pulmonary embolism. The heart is not enlarged. No pericardial effusion. Unremarkable esophagus. Right chest wall port catheter terminates near the superior cavoatrial junction. Lungs/pleura: No pleural effusion or pneumothorax. No pulmonary consolidation. The central airways are patent. No suspicious pulmonary nodules are seen. Upper Abdomen: There is a rim enhancing lesion in the right hepatic lobe, better depicted on comparison CT abdomen pelvis where it has features of hemangioma. Atherosclerotic disease. IVC filter with apex at the level of the renal veins. .  Status post cholecystectomy. Sarah cm left adrenal nodule. Soft Tissues/Bones: There are numerous bone metastases, particularly in the spine. Mild height loss of T3, T4, and T5. Moderate height loss of T9. Mild height loss of T12, L1, and L2. Bilateral rib metastases. Right humeral head metastasis. Bilateral breast implants. Enlarged mediastinal lymph nodes are suspicious for lymph node metastases. Prominent but subcentimeter short axis right axillary lymph node is nonspecific. Left adrenal nodule may represent a metastasis. Multiple bone metastases with pathologic fractures. Recommend contrast enhanced MRI of the spine for further evaluation.      MRI THORACIC SPINE W WO CONTRAST    Result Date: 4/12/2022  EXAMINATION: MRI OF THE THORACIC SPINE WITHOUT AND WITH CONTRAST  4/12/2022 9:23 pm TECHNIQUE: Multiplanar multisequence MRI of the thoracic spine was performed without and with the administration of intravenous contrast. COMPARISON: None HISTORY: ORDERING SYSTEM PROVIDED HISTORY: breast mets to spine TECHNOLOGIST PROVIDED HISTORY: Reason for exam:->breast mets to spine What reading provider will be dictating this exam?->CRC FINDINGS: BONES/ALIGNMENT: Thoracic spine alignment is normal.  Pathologic diffuse moderate compression deformity is present at T9. Metastatic disease is present at T3, T4, T5 T9, T10, T11 and T12. There is minor posterior element involvement at T6. No cortical breakthrough is evident although posterior cortical margin of the vertebral body is bulging at T9. This does not result in significant central canal stenosis. SPINAL CORD: No abnormal cord signal is seen. SOFT TISSUES:  No abnormal enhancement of the thoracic spine. No paraspinal mass identified. DEGENERATIVE CHANGES: Diffuse mild age-appropriate degenerative changes are present. Degenerative changes do not result in stenosis however posterior cortical bulging at T9 results in moderate bilateral T9-10 foraminal stenosis. Extensive metastatic disease throughout the thoracic spine with resulting pathologic compression diffusely at T9 which also shows bulging posterior cortical contour. There is resulting moderate bilateral T9-10 foraminal stenosis. MRI LUMBAR SPINE W WO CONTRAST    Result Date: 4/12/2022  EXAMINATION: MRI OF THE LUMBAR SPINE WITHOUT AND WITH CONTRAST  4/12/2022 9:23 pm TECHNIQUE: Multiplanar multisequence MRI of the lumbar spine was performed without and with the administration of intravenous contrast. COMPARISON: None. HISTORY: ORDERING SYSTEM PROVIDED HISTORY: breast mets to spine TECHNOLOGIST PROVIDED HISTORY: Reason for exam:->breast mets to spine What reading provider will be dictating this exam?->CRC FINDINGS: BONES/ALIGNMENT: Lumbar spine alignment and vertebral body heights are normal.  Metastatic disease is present at all lumbar levels although L3 is somewhat less involved. Posterior elements are involved at L1.   The left sacrum and anterior aspect of S1 are involved as are the posterior iliac bones bilaterally, right worse than left. No cortical breakthrough is identified. SPINAL CORD:  The conus terminates normally. SOFT TISSUES: No abnormal enhancement is seen of the lumbar spine. No paraspinal mass identified. L1-L2: There is no significant disc protrusion, spinal canal stenosis or neural foraminal narrowing. L2-L3: There is no significant disc protrusion, spinal canal stenosis or neural foraminal narrowing. L3-L4: There is no significant disc protrusion, spinal canal stenosis or neural foraminal narrowing. L4-L5: There is no significant disc protrusion, spinal canal stenosis or neural foraminal narrowing. L5-S1: There is no significant disc protrusion, spinal canal stenosis or neural foraminal narrowing. Diffuse metastatic disease of the lumbar spine without identified cortical breakthrough. The sacrum and posterior iliac bones bilaterally are also involved. Diffuse mild age-appropriate lumbar spine degenerative changes without stenosis. FLUORO FOR SURGICAL PROCEDURES    Result Date: 4/15/2022  EXAMINATION: SPOT FLUOROSCOPIC IMAGES 4/15/2022 8:56 am TECHNIQUE: Fluoroscopy was provided by the radiology department for procedure. Radiologist was not present during examination. FLUOROSCOPY DOSE AND TYPE OR TIME AND EXPOSURES: Fluoroscopy time equals 12.9 seconds. Total dose equals 7.46 mGy. COMPARISON: None HISTORY: ORDERING SYSTEM PROVIDED HISTORY: L4 bone biopsy TECHNOLOGIST PROVIDED HISTORY: Reason for exam:->L4 bone biopsy What reading provider will be dictating this exam?->CRC Intraprocedural imaging. FINDINGS: 1 spot images of the lumbar spine were obtained. Intraprocedural fluoroscopic spot images as above. See separate procedure report for more information.        Discharge Medications:      Medication List      START taking these medications    apixaban 5 MG Tabs tablet  Commonly known as: ELIQUIS  Take 1 tablet by mouth 2 times daily     celecoxib 100 MG capsule  Commonly known as: CELEBREX  Take 1 capsule by mouth 2 times daily     dexamethasone 4 MG tablet  Commonly known as: DECADRON  Take 1 tablet by mouth every 12 hours for 10 days     hydrOXYzine 25 MG capsule  Commonly known as: VISTARIL  Take 1 capsule by mouth 3 times daily as needed for Anxiety     promethazine 12.5 MG tablet  Commonly known as: PHENERGAN  Take 1 tablet by mouth every 6 hours as needed for Nausea     sucralfate 1 GM tablet  Commonly known as: CARAFATE  Take 1 tablet by mouth 4 times daily        CONTINUE taking these medications    albuterol (2.5 MG/3ML) 0.083% nebulizer solution  Commonly known as: PROVENTIL     albuterol 90 MCG/ACT inhaler  Commonly known as: PROVENTIL;VENTOLIN     diphenoxylate-atropine 2.5-0.025 MG per tablet  Commonly known as: LOMOTIL     fluticasone 110 MCG/ACT inhaler  Commonly known as: FLOVENT HFA     hydroCHLOROthiazide 25 MG tablet  Commonly known as: HYDRODIURIL     metoprolol succinate 50 MG extended release tablet  Commonly known as: TOPROL XL  Take 1 tablet by mouth 2 times daily     montelukast 10 MG tablet  Commonly known as: SINGULAIR     ondansetron 4 MG tablet  Commonly known as: Zofran  Take 1 tablet by mouth every 8 hours as needed for Nausea or Vomiting     pantoprazole 40 MG tablet  Commonly known as: PROTONIX     pravastatin 20 MG tablet  Commonly known as: PRAVACHOL     vitamin D 1.25 MG (03896 UT) Caps capsule  Commonly known as: ERGOCALCIFEROL     zolpidem 10 MG tablet  Commonly known as: AMBIEN        STOP taking these medications    ibuprofen 800 MG tablet  Commonly known as: IBU           Where to Get Your Medications      These medications were sent to [facility], [address] [address]    Phone: [phone]   · apixaban 5 MG Tabs tablet  · celecoxib 100 MG capsule  · dexamethasone 4 MG tablet  · hydrOXYzine 25 MG capsule  · promethazine 12.5 MG tablet  · sucralfate 1 GM tablet         Time Spent on discharge is more than 45 minutes in the examination, evaluation, counseling and review of medications and discharge plan.    +++++++++++++++++++++++++++++++++++++++++++++++++  Codi Ruiz MD  [address]  +++++++++++++++++++++++++++++++++++++++++++++++++  NOTE: This report was transcribed using voice recognition software. Every effort was made to ensure accuracy; however, inadvertent computerized transcription errors may be present.

## 2022-04-28 ENCOUNTER — TELEPHONE (OUTPATIENT)
Dept: ONCOLOGY | Age: 67
End: 2022-04-28

## 2022-04-28 NOTE — TELEPHONE ENCOUNTER
Contacted pt re: positive distress screen. Pt is 68-year-old female who recently completed radiation treatment for metastatic breast.  Pt was admitted to hospital throughout course of tx and was discharged home 4/27/2022. Pt reported that she is doing well at this time. Stated that she is still having some concerns with eating but overall is doing well. Pt indicated that she has no needs at this time. Reviewed role of oncology SW and encouraged pt to notify this provider if needs arise.     Phil Acosta, MSW, GAUTAMW-S  Oncology Social Worker

## 2022-05-04 PROBLEM — G93.41 METABOLIC ENCEPHALOPATHY: Status: ACTIVE | Noted: 2022-05-04

## 2022-05-09 PROBLEM — N39.0 UTI (URINARY TRACT INFECTION): Status: RESOLVED | Noted: 2022-04-09 | Resolved: 2022-05-09

## 2022-06-10 ENCOUNTER — HOSPITAL ENCOUNTER (OUTPATIENT)
Dept: RADIATION ONCOLOGY | Age: 67
Discharge: HOME | End: 2022-06-10
Attending: RADIOLOGY

## 2022-06-16 ENCOUNTER — HOSPITAL ENCOUNTER (OUTPATIENT)
Dept: RADIATION ONCOLOGY | Age: 67
Discharge: HOME | End: 2022-06-16
Attending: RADIOLOGY

## 2023-10-23 NOTE — ONCOLOGY
Pt came down today for radiation tx #10/10 to T-Spine per Dr Marlin Palacio in Radiation Oncology. Pt to room 1 with c/o a cough, especially at night that started last Thursday. She reports that sometimes she wakes up coughing and can hardly gt her breath until she clears the mucous. She is also hypertensive today. She reports that she was prescribed lisinopril but never started it due to she doesn't want to be on long term B/P medications.

## (undated) DEVICE — NEEDLE SPNL L3.5IN PNK HUB S STL REG WALL FIT STYL W/ QNCKE

## (undated) DEVICE — BONE BIOPSY DEVICE F05A BBD SIZE 3: Brand: MEDTRONIC REUSABLE INSTRUMENTS

## (undated) DEVICE — ADHESIVE SKIN CLSR 0.7ML TOP DERMBND ADV

## (undated) DEVICE — SWABSTICK MEDICATED L4IN BENZ TINC SKIN PREP APLICARE

## (undated) DEVICE — SYRINGE 20ML LL S/C 50

## (undated) DEVICE — JACKSON TABLE POSITIONER KIT: Brand: MEDLINE INDUSTRIES, INC.

## (undated) DEVICE — GLOVE SURG 8.5 PF POLYMER WHT STRL SIGN LTX ESSENTIAL LTX

## (undated) DEVICE — KIT KPT1505 PAKTRY 15/3 FF W/ONESTEP-RB: Brand: KYPHOPAK™ TRAY

## (undated) DEVICE — 3M™ IOBAN™ 2 ANTIMICROBIAL INCISE DRAPE 6640EZ: Brand: IOBAN™ 2

## (undated) DEVICE — STRIP,CLOSURE,WOUND,MEDI-STRIP,1/4X3: Brand: MEDLINE

## (undated) DEVICE — C-ARM: Brand: UNBRANDED

## (undated) DEVICE — GAUZE,SPONGE,4"X4",16PLY,XRAY,STRL,LF: Brand: MEDLINE

## (undated) DEVICE — SOLUTION IRRIG 1000ML 0.9% SOD CHL USP POUR PLAS BTL

## (undated) DEVICE — GOWN,SIRUS,FABRNF,2XL,18/CS: Brand: MEDLINE

## (undated) DEVICE — NEEDLE HYPO 25GA L1.5IN BLU POLYPR HUB S STL REG BVL STR

## (undated) DEVICE — INTRODUCER T15K ONE STEP OID BEVEL: Brand: KYPHON® ONE-STEP™ OSTEO INTRODUCER™ SYSTEM